# Patient Record
Sex: FEMALE | Race: WHITE | NOT HISPANIC OR LATINO | URBAN - METROPOLITAN AREA
[De-identification: names, ages, dates, MRNs, and addresses within clinical notes are randomized per-mention and may not be internally consistent; named-entity substitution may affect disease eponyms.]

---

## 2017-04-09 ENCOUNTER — INPATIENT HOSPITAL (OUTPATIENT)
Dept: URBAN - METROPOLITAN AREA HOSPITAL 76 | Facility: HOSPITAL | Age: 77
End: 2017-04-09

## 2017-04-09 DIAGNOSIS — D50.9 IRON DEFICIENCY ANEMIA, UNSPECIFIED: ICD-10-CM

## 2017-04-09 DIAGNOSIS — R10.13 EPIGASTRIC PAIN: ICD-10-CM

## 2017-04-09 PROCEDURE — 99221 1ST HOSP IP/OBS SF/LOW 40: CPT | Performed by: INTERNAL MEDICINE

## 2017-04-10 PROCEDURE — 43235 EGD DIAGNOSTIC BRUSH WASH: CPT | Performed by: INTERNAL MEDICINE

## 2017-04-11 PROCEDURE — 99231 SBSQ HOSP IP/OBS SF/LOW 25: CPT | Performed by: INTERNAL MEDICINE

## 2018-03-09 ENCOUNTER — HOSPITAL ENCOUNTER (OUTPATIENT)
Dept: FAMILY MEDICINE CLINIC | Facility: CLINIC | Age: 78
Setting detail: SPECIMEN
Discharge: HOME | End: 2018-03-09
Attending: FAMILY MEDICINE | Admitting: FAMILY MEDICINE

## 2018-06-05 ENCOUNTER — HOSPITAL ENCOUNTER (OUTPATIENT)
Dept: FAMILY MEDICINE CLINIC | Facility: CLINIC | Age: 78
Setting detail: SPECIMEN
Discharge: HOME | End: 2018-06-05
Attending: FAMILY MEDICINE | Admitting: FAMILY MEDICINE

## 2019-03-22 ENCOUNTER — HOSPITAL ENCOUNTER (OUTPATIENT)
Dept: FAMILY MEDICINE CLINIC | Facility: CLINIC | Age: 79
Discharge: HOME | End: 2019-03-22
Attending: FAMILY MEDICINE | Admitting: FAMILY MEDICINE

## 2019-03-22 ENCOUNTER — HOSPITAL ENCOUNTER (OUTPATIENT)
Dept: FAMILY MEDICINE CLINIC | Facility: CLINIC | Age: 79
Setting detail: SPECIMEN
Discharge: HOME | End: 2019-03-22
Attending: FAMILY MEDICINE | Admitting: FAMILY MEDICINE

## 2019-03-22 LAB
ALBUMIN SERPL-MCNC: 3.6 G/DL (ref 3.5–4.8)
ALBUMIN/GLOB SERPL: 1.3 {RATIO} (ref 1–1.7)
ALP SERPL-CCNC: 84 IU/L (ref 32–91)
ALT SERPL-CCNC: 8 IU/L (ref 14–54)
ANION GAP SERPL CALC-SCNC: 15.9 MMOL/L (ref 10–20)
AST SERPL-CCNC: 16 IU/L (ref 15–41)
BASOPHILS # BLD AUTO: 0 10*3/UL (ref 0–0.2)
BASOPHILS NFR BLD AUTO: 1 % (ref 0–2)
BILIRUB SERPL-MCNC: 0.5 MG/DL (ref 0.3–1.2)
BUN SERPL-MCNC: 14 MG/DL (ref 8–20)
BUN/CREAT SERPL: 35 (ref 5.4–26.2)
CALCIUM SERPL-MCNC: 8.7 MG/DL (ref 8.9–10.3)
CHLORIDE SERPL-SCNC: 103 MMOL/L (ref 101–111)
CONV CO2: 23 MMOL/L (ref 22–32)
CONV TOTAL PROTEIN: 6.3 G/DL (ref 6.1–7.9)
CREAT UR-MCNC: 0.4 MG/DL (ref 0.4–1)
DIFFERENTIAL METHOD BLD: (no result)
EOSINOPHIL # BLD AUTO: 0.1 10*3/UL (ref 0–0.3)
EOSINOPHIL # BLD AUTO: 1 % (ref 0–3)
ERYTHROCYTE [DISTWIDTH] IN BLOOD BY AUTOMATED COUNT: 17.1 % (ref 11.5–14.5)
GLOBULIN UR ELPH-MCNC: 2.7 G/DL (ref 2.5–3.8)
GLUCOSE SERPL-MCNC: 78 MG/DL (ref 65–99)
HCT VFR BLD AUTO: 38.3 % (ref 35–49)
HGB BLD-MCNC: 12.3 G/DL (ref 12–15)
LYMPHOCYTES # BLD AUTO: 1.2 10*3/UL (ref 0.8–4.8)
LYMPHOCYTES NFR BLD AUTO: 15 % (ref 18–42)
MCH RBC QN AUTO: 26.2 PG (ref 26–32)
MCHC RBC AUTO-ENTMCNC: 32.1 G/DL (ref 32–36)
MCV RBC AUTO: 81.8 FL (ref 80–94)
MONOCYTES # BLD AUTO: 0.6 10*3/UL (ref 0.1–1.3)
MONOCYTES NFR BLD AUTO: 7 % (ref 2–11)
NEUTROPHILS # BLD AUTO: 5.7 10*3/UL (ref 2.3–8.6)
NEUTROPHILS NFR BLD AUTO: 76 % (ref 50–75)
NRBC BLD AUTO-RTO: 0 /100{WBCS}
NRBC/RBC NFR BLD MANUAL: 0 10*3/UL
PLATELET # BLD AUTO: 169 10*3/UL (ref 150–450)
PMV BLD AUTO: 9.9 FL (ref 7.4–10.4)
POTASSIUM SERPL-SCNC: 3.9 MMOL/L (ref 3.6–5.1)
RBC # BLD AUTO: 4.68 10*6/UL (ref 4–5.4)
SODIUM SERPL-SCNC: 138 MMOL/L (ref 136–144)
WBC # BLD AUTO: 7.6 10*3/UL (ref 4.5–11.5)

## 2019-06-17 RX ORDER — ZOLPIDEM TARTRATE 10 MG/1
10 TABLET ORAL NIGHTLY PRN
Qty: 30 TABLET | Refills: 0 | Status: SHIPPED | OUTPATIENT
Start: 2019-06-17 | End: 2019-07-22 | Stop reason: SDUPTHER

## 2019-06-17 RX ORDER — ZOLPIDEM TARTRATE 10 MG/1
TABLET ORAL
COMMUNITY
Start: 2019-05-21 | End: 2019-06-17 | Stop reason: SDUPTHER

## 2019-06-17 NOTE — TELEPHONE ENCOUNTER
Zolpidem request. Last filled at pharmacy 5/21/19.  Pt cancelled appt today - rescheduled for 7/22/19.

## 2019-06-20 ENCOUNTER — TELEPHONE (OUTPATIENT)
Dept: FAMILY MEDICINE CLINIC | Facility: CLINIC | Age: 79
End: 2019-06-20

## 2019-06-20 NOTE — TELEPHONE ENCOUNTER
"Trina  nurse left VM stating that pt's R/Knee pain is \"intolerable\".  [facility] is asking if you want them to do an x-ray, if not they can send her to the ER.  (Note: [employer] was down most of the afternoon and VM's were not able to retrieve while network out)  "

## 2019-06-27 ENCOUNTER — TELEPHONE (OUTPATIENT)
Dept: FAMILY MEDICINE CLINIC | Facility: CLINIC | Age: 79
End: 2019-06-27

## 2019-06-27 RX ORDER — LEVOTHYROXINE SODIUM 0.15 MG/1
TABLET ORAL
COMMUNITY
Start: 2018-01-23 | End: 2019-08-26

## 2019-06-27 RX ORDER — DIAZEPAM 10 MG/1
10 TABLET ORAL NIGHTLY PRN
Refills: 0 | COMMUNITY
Start: 2019-05-17 | End: 2019-07-22 | Stop reason: SDUPTHER

## 2019-06-27 RX ORDER — ACETAMINOPHEN 325 MG/1
TABLET ORAL
COMMUNITY
End: 2019-08-26

## 2019-06-27 RX ORDER — LEVOTHYROXINE SODIUM 137 UG/1
TABLET ORAL
COMMUNITY
End: 2020-04-01 | Stop reason: SDUPTHER

## 2019-06-27 RX ORDER — APIXABAN 5 MG/1
TABLET, FILM COATED ORAL
Refills: 1 | COMMUNITY
Start: 2019-03-22 | End: 2019-08-26

## 2019-06-27 RX ORDER — SULFAMETHOXAZOLE AND TRIMETHOPRIM 800; 160 MG/1; MG/1
2 TABLET ORAL 2 TIMES DAILY
Refills: 0 | COMMUNITY
Start: 2019-03-25 | End: 2019-08-26

## 2019-06-27 RX ORDER — CHOLECALCIFEROL (VITAMIN D3) 1250 MCG
CAPSULE ORAL
COMMUNITY
Start: 2018-06-11 | End: 2019-09-26 | Stop reason: SDUPTHER

## 2019-06-27 RX ORDER — GABAPENTIN 100 MG/1
CAPSULE ORAL
Refills: 2 | COMMUNITY
Start: 2019-05-20 | End: 2019-09-20 | Stop reason: SDUPTHER

## 2019-06-27 RX ORDER — NALOXONE HYDROCHLORIDE 2 MG/.4ML
2 INJECTION, SOLUTION INTRAMUSCULAR; SUBCUTANEOUS
COMMUNITY
End: 2021-02-26

## 2019-06-27 RX ORDER — BISACODYL 10 MG
SUPPOSITORY, RECTAL RECTAL
COMMUNITY
End: 2019-08-26

## 2019-06-27 RX ORDER — NYSTATIN 100000 U/G
OINTMENT TOPICAL
COMMUNITY
End: 2019-12-05

## 2019-06-27 RX ORDER — FENTANYL 100 UG/H
PATCH TRANSDERMAL
Refills: 0 | COMMUNITY
Start: 2019-06-07 | End: 2019-07-10 | Stop reason: SDUPTHER

## 2019-06-27 RX ORDER — HYDROCODONE BITARTRATE AND ACETAMINOPHEN 7.5; 325 MG/1; MG/1
TABLET ORAL
COMMUNITY
Start: 2018-06-09 | End: 2019-07-10 | Stop reason: SDUPTHER

## 2019-06-27 RX ORDER — RANITIDINE 150 MG/1
150 TABLET ORAL 2 TIMES DAILY PRN
COMMUNITY
End: 2020-11-24

## 2019-06-27 RX ORDER — ZOLPIDEM TARTRATE 10 MG/1
TABLET ORAL
COMMUNITY
Start: 2018-01-23 | End: 2019-07-22 | Stop reason: SDUPTHER

## 2019-06-27 NOTE — TELEPHONE ENCOUNTER
Refill phenergan 25 mg [facility]  * this medication is not in her med list    Last visit 3.22.19  Last lab 3.22.19

## 2019-07-03 RX ORDER — PROMETHAZINE HYDROCHLORIDE 25 MG/1
TABLET ORAL
Qty: 60 TABLET | Refills: 0 | Status: SHIPPED | OUTPATIENT
Start: 2019-07-03 | End: 2019-09-13

## 2019-07-03 NOTE — TELEPHONE ENCOUNTER
Last visit:  3/22/19  Next visit: 7/22/19  Last labs: 3/22/19    Rx requested: Promethazine 25mg   Pharmacy: [facility] in [place]

## 2019-07-10 RX ORDER — HYDROCODONE BITARTRATE AND ACETAMINOPHEN 7.5; 325 MG/1; MG/1
1 TABLET ORAL DAILY PRN
Qty: 10 TABLET | Refills: 0 | Status: SHIPPED | OUTPATIENT
Start: 2019-07-10 | End: 2019-07-22 | Stop reason: SDUPTHER

## 2019-07-10 RX ORDER — FENTANYL 100 UG/H
PATCH TRANSDERMAL
Qty: 4 PATCH | Refills: 0 | Status: SHIPPED | OUTPATIENT
Start: 2019-07-10 | End: 2019-08-14 | Stop reason: SDUPTHER

## 2019-07-10 NOTE — TELEPHONE ENCOUNTER
Pt called requesting refills of patches and pain meds - [facility]  Inspect ran and given to  to scan.   Has appt jose j Agarwal 7/22

## 2019-07-22 ENCOUNTER — TELEPHONE (OUTPATIENT)
Dept: FAMILY MEDICINE CLINIC | Facility: CLINIC | Age: 79
End: 2019-07-22

## 2019-07-22 PROBLEM — Z12.31 VISIT FOR SCREENING MAMMOGRAM: Status: ACTIVE | Noted: 2018-03-09

## 2019-07-22 PROBLEM — M79.606 PAIN OF LOWER EXTREMITY: Status: ACTIVE | Noted: 2019-03-22

## 2019-07-22 PROBLEM — G14 POST POLIOMYELITIS SYNDROME: Status: ACTIVE | Noted: 2018-01-23

## 2019-07-22 PROBLEM — M79.661 RIGHT CALF PAIN: Status: ACTIVE | Noted: 2019-03-22

## 2019-07-22 PROBLEM — G62.9 NEUROPATHY, PERIPHERAL: Status: ACTIVE | Noted: 2018-01-23

## 2019-07-22 PROBLEM — R82.90 ABNORMAL URINALYSIS: Status: ACTIVE | Noted: 2018-07-27

## 2019-07-22 PROBLEM — R53.83 MALAISE AND FATIGUE: Status: ACTIVE | Noted: 2018-03-09

## 2019-07-22 PROBLEM — K21.9 GERD (GASTROESOPHAGEAL REFLUX DISEASE): Status: ACTIVE | Noted: 2018-01-23

## 2019-07-22 PROBLEM — R23.8 BROKEN SKIN: Status: ACTIVE | Noted: 2018-06-05

## 2019-07-22 PROBLEM — L89.109: Status: ACTIVE | Noted: 2018-07-27

## 2019-07-22 PROBLEM — M54.89 OTHER DORSALGIA: Status: ACTIVE | Noted: 2018-02-09

## 2019-07-22 PROBLEM — E55.9 VITAMIN D DEFICIENCY: Status: ACTIVE | Noted: 2018-06-05

## 2019-07-22 PROBLEM — G47.00 INSOMNIA: Status: ACTIVE | Noted: 2018-01-23

## 2019-07-22 PROBLEM — M41.9 KYPHOSCOLIOSIS: Status: ACTIVE | Noted: 2018-01-23

## 2019-07-22 PROBLEM — L98.499 SKIN ULCER: Status: ACTIVE | Noted: 2018-07-27

## 2019-07-22 PROBLEM — A80.9 POLIOMYELITIS: Status: ACTIVE | Noted: 2018-01-23

## 2019-07-22 PROBLEM — M40.209 KYPHOSIS: Status: ACTIVE | Noted: 2018-01-23

## 2019-07-22 PROBLEM — E03.9 HYPOTHYROIDISM: Status: ACTIVE | Noted: 2018-01-23

## 2019-07-22 PROBLEM — M25.571 ARTHRALGIA OF RIGHT FOOT: Status: ACTIVE | Noted: 2019-03-22

## 2019-07-22 PROBLEM — R53.81 MALAISE AND FATIGUE: Status: ACTIVE | Noted: 2018-03-09

## 2019-07-22 PROBLEM — M81.8 DISUSE OSTEOPOROSIS: Status: ACTIVE | Noted: 2018-01-23

## 2019-07-22 RX ORDER — HYDROCODONE BITARTRATE AND ACETAMINOPHEN 7.5; 325 MG/1; MG/1
1 TABLET ORAL DAILY PRN
Qty: 30 TABLET | Refills: 0 | Status: SHIPPED | OUTPATIENT
Start: 2019-07-22 | End: 2019-08-26 | Stop reason: SDUPTHER

## 2019-07-22 RX ORDER — ZOLPIDEM TARTRATE 10 MG/1
10 TABLET ORAL NIGHTLY PRN
Qty: 30 TABLET | Refills: 0 | Status: SHIPPED | OUTPATIENT
Start: 2019-07-22 | End: 2019-08-19 | Stop reason: SDUPTHER

## 2019-07-22 RX ORDER — DIAZEPAM 10 MG/1
10 TABLET ORAL NIGHTLY PRN
Qty: 30 TABLET | Refills: 0 | Status: SHIPPED | OUTPATIENT
Start: 2019-07-22 | End: 2019-08-19 | Stop reason: SDUPTHER

## 2019-07-22 NOTE — TELEPHONE ENCOUNTER
"Pt called this morning to inform us that the transportation company called her to tell her they had no one available to bring her to her appt today, even though she scheduled it over a week ago with them.    I called [facility] and told them it was urgent that she be seen today, and that they needed to find someone to bring her, even if it was earlier in the day. They did not seem to think they could get anyone but said they would \"try\". (I was not very happy, and they knew it).    I called pt and informed her they \"may call her when a ride is ready\".  She is very upset, knows that she needs to be seen.  States she is out of her norco, ambien, and diazepam.  I told her I would inform Dr. Agarwal of the situation in case she wouldn't make appt.  Pt will call office if she hears from transportation company with any updates.  "

## 2019-07-23 NOTE — TELEPHONE ENCOUNTER
Pt was not picked up by transportation.  Dr. Franz sent in meds.  Pt was notified and rescheduled.

## 2019-08-09 ENCOUNTER — TELEPHONE (OUTPATIENT)
Dept: FAMILY MEDICINE CLINIC | Facility: CLINIC | Age: 79
End: 2019-08-09

## 2019-08-09 RX ORDER — VALACYCLOVIR HYDROCHLORIDE 1 G/1
1000 TABLET, FILM COATED ORAL 3 TIMES DAILY
Qty: 21 TABLET | Refills: 0 | Status: SHIPPED | OUTPATIENT
Start: 2019-08-09 | End: 2019-08-26

## 2019-08-14 RX ORDER — HYDROCODONE BITARTRATE AND ACETAMINOPHEN 7.5; 325 MG/1; MG/1
1 TABLET ORAL DAILY PRN
Qty: 30 TABLET | Refills: 0 | OUTPATIENT
Start: 2019-08-14

## 2019-08-14 RX ORDER — DIAZEPAM 10 MG/1
10 TABLET ORAL NIGHTLY PRN
Qty: 30 TABLET | Refills: 0 | OUTPATIENT
Start: 2019-08-14

## 2019-08-14 RX ORDER — FENTANYL 100 UG/H
PATCH TRANSDERMAL
Qty: 10 PATCH | Refills: 0 | Status: SHIPPED | OUTPATIENT
Start: 2019-08-14 | End: 2019-08-26 | Stop reason: SDUPTHER

## 2019-08-14 RX ORDER — ZOLPIDEM TARTRATE 10 MG/1
10 TABLET ORAL NIGHTLY PRN
Qty: 30 TABLET | Refills: 0 | OUTPATIENT
Start: 2019-08-14

## 2019-08-14 RX ORDER — FENTANYL 100 UG/H
PATCH TRANSDERMAL
Qty: 4 PATCH | Refills: 0 | OUTPATIENT
Start: 2019-08-14

## 2019-08-14 NOTE — TELEPHONE ENCOUNTER
Pt was unable to make appt today due to transportation company AGAIN.    Pt will need refills on the following:  Diazepam  Hydrocodone  ambien  Fentanyl patches    [facility]    Pt did reschedule for later in the month.

## 2019-08-19 RX ORDER — ZOLPIDEM TARTRATE 10 MG/1
10 TABLET ORAL NIGHTLY PRN
Qty: 30 TABLET | Refills: 0 | Status: SHIPPED | OUTPATIENT
Start: 2019-08-19 | End: 2019-09-20 | Stop reason: SDUPTHER

## 2019-08-19 RX ORDER — DIAZEPAM 10 MG/1
10 TABLET ORAL NIGHTLY PRN
Qty: 30 TABLET | Refills: 0 | Status: SHIPPED | OUTPATIENT
Start: 2019-08-19 | End: 2019-09-20 | Stop reason: SDUPTHER

## 2019-08-26 ENCOUNTER — OFFICE VISIT (OUTPATIENT)
Dept: FAMILY MEDICINE CLINIC | Facility: CLINIC | Age: 79
End: 2019-08-26

## 2019-08-26 VITALS
DIASTOLIC BLOOD PRESSURE: 77 MMHG | HEART RATE: 96 BPM | OXYGEN SATURATION: 97 % | SYSTOLIC BLOOD PRESSURE: 146 MMHG | RESPIRATION RATE: 18 BRPM | TEMPERATURE: 98.1 F

## 2019-08-26 DIAGNOSIS — Z12.39 BREAST CANCER SCREENING: ICD-10-CM

## 2019-08-26 DIAGNOSIS — E03.9 ACQUIRED HYPOTHYROIDISM: ICD-10-CM

## 2019-08-26 DIAGNOSIS — B02.29 POSTHERPETIC NEURALGIA: ICD-10-CM

## 2019-08-26 DIAGNOSIS — M41.9 KYPHOSCOLIOSIS: ICD-10-CM

## 2019-08-26 DIAGNOSIS — G14 POST-POLIO SYNDROME: Primary | ICD-10-CM

## 2019-08-26 DIAGNOSIS — E55.9 VITAMIN D DEFICIENCY: ICD-10-CM

## 2019-08-26 DIAGNOSIS — Z23 NEED FOR PNEUMOCOCCAL VACCINE: ICD-10-CM

## 2019-08-26 DIAGNOSIS — L89.143 PRESSURE INJURY OF LEFT LOWER BACK, STAGE 3 (HCC): ICD-10-CM

## 2019-08-26 LAB
T4 FREE SERPL-MCNC: 0.93 NG/DL (ref 0.58–1.64)
TSH SERPL DL<=0.05 MIU/L-ACNC: 0.72 MIU/ML (ref 0.34–5.6)

## 2019-08-26 PROCEDURE — 99214 OFFICE O/P EST MOD 30 MIN: CPT | Performed by: FAMILY MEDICINE

## 2019-08-26 PROCEDURE — 84439 ASSAY OF FREE THYROXINE: CPT | Performed by: FAMILY MEDICINE

## 2019-08-26 PROCEDURE — 36415 COLL VENOUS BLD VENIPUNCTURE: CPT | Performed by: FAMILY MEDICINE

## 2019-08-26 PROCEDURE — 90670 PCV13 VACCINE IM: CPT | Performed by: FAMILY MEDICINE

## 2019-08-26 PROCEDURE — 82306 VITAMIN D 25 HYDROXY: CPT | Performed by: FAMILY MEDICINE

## 2019-08-26 PROCEDURE — G0009 ADMIN PNEUMOCOCCAL VACCINE: HCPCS | Performed by: FAMILY MEDICINE

## 2019-08-26 PROCEDURE — 84443 ASSAY THYROID STIM HORMONE: CPT | Performed by: FAMILY MEDICINE

## 2019-08-26 RX ORDER — NITROGLYCERIN 0.4 MG/1
0.4 TABLET SUBLINGUAL
Qty: 50 TABLET | Refills: 1 | Status: SHIPPED | OUTPATIENT
Start: 2019-08-26 | End: 2020-04-07 | Stop reason: SDUPTHER

## 2019-08-26 RX ORDER — FENTANYL 100 UG/H
1 PATCH TRANSDERMAL
Qty: 15 PATCH | Refills: 0 | Status: SHIPPED | OUTPATIENT
Start: 2019-08-26 | End: 2019-11-08 | Stop reason: SDUPTHER

## 2019-08-26 RX ORDER — HYDROCODONE BITARTRATE AND ACETAMINOPHEN 7.5; 325 MG/1; MG/1
1 TABLET ORAL DAILY PRN
Qty: 30 TABLET | Refills: 0 | Status: SHIPPED | OUTPATIENT
Start: 2019-08-26 | End: 2019-09-30 | Stop reason: SDUPTHER

## 2019-08-27 LAB — 25(OH)D3 SERPL-MCNC: 48 NG/ML (ref 30–100)

## 2019-09-13 RX ORDER — GABAPENTIN 100 MG/1
CAPSULE ORAL
Qty: 150 CAPSULE | Refills: 0 | Status: SHIPPED | OUTPATIENT
Start: 2019-09-13 | End: 2019-11-07 | Stop reason: SDUPTHER

## 2019-09-13 RX ORDER — PROMETHAZINE HYDROCHLORIDE 25 MG/1
TABLET ORAL
Qty: 60 TABLET | Refills: 0 | OUTPATIENT
Start: 2019-09-13

## 2019-09-13 RX ORDER — PROMETHAZINE HYDROCHLORIDE 25 MG/1
25 TABLET ORAL EVERY 6 HOURS PRN
Qty: 46 TABLET | Refills: 0 | Status: SHIPPED | OUTPATIENT
Start: 2019-09-13 | End: 2019-11-07 | Stop reason: SDUPTHER

## 2019-09-13 RX ORDER — GABAPENTIN 100 MG/1
CAPSULE ORAL
Refills: 2 | OUTPATIENT
Start: 2019-09-13

## 2019-09-20 RX ORDER — ZOLPIDEM TARTRATE 10 MG/1
10 TABLET ORAL NIGHTLY PRN
Qty: 30 TABLET | Refills: 0 | Status: SHIPPED | OUTPATIENT
Start: 2019-09-20 | End: 2019-09-20 | Stop reason: SDUPTHER

## 2019-09-20 RX ORDER — DIAZEPAM 10 MG/1
10 TABLET ORAL NIGHTLY PRN
Qty: 30 TABLET | Refills: 0 | Status: SHIPPED | OUTPATIENT
Start: 2019-09-20 | End: 2019-09-23

## 2019-09-20 RX ORDER — DIAZEPAM 10 MG/1
10 TABLET ORAL NIGHTLY PRN
Qty: 30 TABLET | Refills: 0 | Status: SHIPPED | OUTPATIENT
Start: 2019-09-20 | End: 2019-09-20 | Stop reason: SDUPTHER

## 2019-09-20 RX ORDER — ZOLPIDEM TARTRATE 10 MG/1
10 TABLET ORAL NIGHTLY PRN
Qty: 30 TABLET | Refills: 0 | Status: SHIPPED | OUTPATIENT
Start: 2019-09-20 | End: 2019-09-23 | Stop reason: SDUPTHER

## 2019-09-20 NOTE — TELEPHONE ENCOUNTER
"Pt called saying that [facility] told her that they will no longer be coming 2x/wk to change dressings on back after 10/1. I asked her how it was looking and she says they tell her it \"looks good\" but its not well yet. She said it was initially ordered from Dr Solis.  "

## 2019-09-23 RX ORDER — DIAZEPAM 10 MG/1
10 TABLET ORAL DAILY PRN
Qty: 30 TABLET | Refills: 0 | Status: SHIPPED | OUTPATIENT
Start: 2019-09-23 | End: 2019-11-04 | Stop reason: SDUPTHER

## 2019-09-23 RX ORDER — ZOLPIDEM TARTRATE 10 MG/1
10 TABLET ORAL NIGHTLY PRN
Qty: 30 TABLET | Refills: 0 | Status: SHIPPED | OUTPATIENT
Start: 2019-09-23 | End: 2019-10-21

## 2019-09-26 ENCOUNTER — TELEPHONE (OUTPATIENT)
Dept: FAMILY MEDICINE CLINIC | Facility: CLINIC | Age: 79
End: 2019-09-26

## 2019-09-26 RX ORDER — CHOLECALCIFEROL (VITAMIN D3) 1250 MCG
50000 CAPSULE ORAL
Qty: 12 CAPSULE | Refills: 0 | Status: SHIPPED | OUTPATIENT
Start: 2019-09-26 | End: 2020-04-07

## 2019-09-30 ENCOUNTER — TELEPHONE (OUTPATIENT)
Dept: FAMILY MEDICINE CLINIC | Facility: CLINIC | Age: 79
End: 2019-09-30

## 2019-09-30 RX ORDER — HYDROCODONE BITARTRATE AND ACETAMINOPHEN 7.5; 325 MG/1; MG/1
1 TABLET ORAL DAILY PRN
Qty: 30 TABLET | Refills: 0 | Status: SHIPPED | OUTPATIENT
Start: 2019-09-30 | End: 2019-10-31 | Stop reason: SDUPTHER

## 2019-10-21 RX ORDER — ZOLPIDEM TARTRATE 10 MG/1
TABLET ORAL
Qty: 30 TABLET | Refills: 0 | Status: SHIPPED | OUTPATIENT
Start: 2019-10-21 | End: 2019-11-18 | Stop reason: SDUPTHER

## 2019-10-21 RX ORDER — DIAZEPAM 10 MG/1
TABLET ORAL
Qty: 30 TABLET | Refills: 0 | Status: SHIPPED | OUTPATIENT
Start: 2019-10-21 | End: 2019-11-18 | Stop reason: SDUPTHER

## 2019-10-31 RX ORDER — HYDROCODONE BITARTRATE AND ACETAMINOPHEN 7.5; 325 MG/1; MG/1
1 TABLET ORAL DAILY PRN
Qty: 30 TABLET | Refills: 0 | Status: SHIPPED | OUTPATIENT
Start: 2019-10-31 | End: 2019-12-02 | Stop reason: SDUPTHER

## 2019-11-04 RX ORDER — HYDROCODONE BITARTRATE AND ACETAMINOPHEN 7.5; 325 MG/1; MG/1
1 TABLET ORAL DAILY PRN
Qty: 30 TABLET | Refills: 0
Start: 2019-11-04 | End: 2019-11-04 | Stop reason: SDUPTHER

## 2019-11-07 RX ORDER — PROMETHAZINE HYDROCHLORIDE 25 MG/1
25 TABLET ORAL EVERY 6 HOURS PRN
Qty: 46 TABLET | Refills: 0 | Status: SHIPPED | OUTPATIENT
Start: 2019-11-07 | End: 2019-12-31

## 2019-11-07 RX ORDER — GABAPENTIN 100 MG/1
CAPSULE ORAL
Qty: 150 CAPSULE | Refills: 0 | Status: SHIPPED | OUTPATIENT
Start: 2019-11-07 | End: 2020-01-31

## 2019-11-08 RX ORDER — FENTANYL 100 UG/H
1 PATCH TRANSDERMAL
Qty: 10 PATCH | Refills: 0 | Status: SHIPPED | OUTPATIENT
Start: 2019-11-08 | End: 2019-11-08 | Stop reason: SDUPTHER

## 2019-11-08 RX ORDER — FENTANYL 100 UG/H
1 PATCH TRANSDERMAL
Qty: 10 PATCH | Refills: 0 | Status: SHIPPED | OUTPATIENT
Start: 2019-11-08 | End: 2019-12-11 | Stop reason: SDUPTHER

## 2019-11-08 NOTE — TELEPHONE ENCOUNTER
[facility] called asking to verify directions on the patches, bc it says both:     Sig: Place 1 patch on the skin as directed by provider Every Other Day. APPLY ONE PATCH EVERY 72 HRS.    Please verify and resend.

## 2019-11-18 ENCOUNTER — OFFICE VISIT (OUTPATIENT)
Dept: FAMILY MEDICINE CLINIC | Facility: CLINIC | Age: 79
End: 2019-11-18

## 2019-11-18 VITALS
HEIGHT: 62 IN | RESPIRATION RATE: 16 BRPM | HEART RATE: 74 BPM | SYSTOLIC BLOOD PRESSURE: 162 MMHG | TEMPERATURE: 98 F | OXYGEN SATURATION: 96 % | DIASTOLIC BLOOD PRESSURE: 76 MMHG

## 2019-11-18 DIAGNOSIS — M41.9 KYPHOSCOLIOSIS: ICD-10-CM

## 2019-11-18 DIAGNOSIS — Z23 NEED FOR INFLUENZA VACCINATION: ICD-10-CM

## 2019-11-18 DIAGNOSIS — M25.522 ELBOW PAIN, LEFT: Primary | ICD-10-CM

## 2019-11-18 DIAGNOSIS — F51.01 PRIMARY INSOMNIA: ICD-10-CM

## 2019-11-18 DIAGNOSIS — G14 POST POLIOMYELITIS SYNDROME: ICD-10-CM

## 2019-11-18 PROCEDURE — G0008 ADMIN INFLUENZA VIRUS VAC: HCPCS | Performed by: FAMILY MEDICINE

## 2019-11-18 PROCEDURE — 99213 OFFICE O/P EST LOW 20 MIN: CPT | Performed by: FAMILY MEDICINE

## 2019-11-18 PROCEDURE — 90653 IIV ADJUVANT VACCINE IM: CPT | Performed by: FAMILY MEDICINE

## 2019-11-18 RX ORDER — ZOLPIDEM TARTRATE 10 MG/1
10 TABLET ORAL NIGHTLY PRN
Qty: 30 TABLET | Refills: 0 | Status: SHIPPED | OUTPATIENT
Start: 2019-11-18 | End: 2019-12-17 | Stop reason: SDUPTHER

## 2019-11-18 RX ORDER — TIZANIDINE HYDROCHLORIDE 4 MG/1
4 CAPSULE, GELATIN COATED ORAL 3 TIMES DAILY PRN
Qty: 35 CAPSULE | Refills: 0 | Status: SHIPPED | OUTPATIENT
Start: 2019-11-18 | End: 2020-01-07

## 2019-11-18 RX ORDER — DIAZEPAM 10 MG/1
10 TABLET ORAL NIGHTLY PRN
Qty: 30 TABLET | Refills: 0 | Status: SHIPPED | OUTPATIENT
Start: 2019-11-18 | End: 2019-12-18 | Stop reason: SDUPTHER

## 2019-11-26 DIAGNOSIS — Z12.39 BREAST CANCER SCREENING: ICD-10-CM

## 2019-12-02 RX ORDER — HYDROCODONE BITARTRATE AND ACETAMINOPHEN 7.5; 325 MG/1; MG/1
1 TABLET ORAL DAILY PRN
Qty: 30 TABLET | Refills: 0 | Status: SHIPPED | OUTPATIENT
Start: 2019-12-02 | End: 2020-01-02 | Stop reason: SDUPTHER

## 2019-12-02 NOTE — TELEPHONE ENCOUNTER
Pt LM asking for pain med refill - [facility]    appt 11/19  Labs 8/19

## 2019-12-11 ENCOUNTER — TELEPHONE (OUTPATIENT)
Dept: FAMILY MEDICINE CLINIC | Facility: CLINIC | Age: 79
End: 2019-12-11

## 2019-12-11 DIAGNOSIS — G14 POST POLIOMYELITIS SYNDROME: Primary | ICD-10-CM

## 2019-12-11 RX ORDER — FENTANYL 100 UG/H
1 PATCH TRANSDERMAL
Qty: 10 PATCH | Refills: 0 | Status: SHIPPED | OUTPATIENT
Start: 2019-12-11 | End: 2020-01-20 | Stop reason: SDUPTHER

## 2019-12-11 NOTE — TELEPHONE ENCOUNTER
Last visit:  11/18/19  Next visit: 1/7/20  Last labs: 8/26/19    Rx requested: Fentanyl patch   Pharmacy: [facility] in [place]     (Patient called for refill)

## 2019-12-14 NOTE — PROGRESS NOTES
Subjective   Karina Saleem is a 79 y.o. female.     Chief Complaint   Patient presents with   • Elbow Pain     left elbow pain for several weeks   • Pain   • Insomnia         Current Outpatient Medications:   •  Cholecalciferol (VITAMIN D3) 53917 units capsule, Take 1 capsule by mouth Every 7 (Seven) Days., Disp: 12 capsule, Rfl: 0  •  diazePAM (VALIUM) 10 MG tablet, Take 1 tablet by mouth At Night As Needed for Anxiety., Disp: 30 tablet, Rfl: 0  •  gabapentin (NEURONTIN) 100 MG capsule, 2 po bid and 1 po QHS, Disp: 150 capsule, Rfl: 0  •  levothyroxine (SYNTHROID, LEVOTHROID) 137 MCG tablet, levothyroxine 137 mcg tablet, Disp: , Rfl:   •  Naloxone HCl (EVZIO) 2 MG/0.4ML solution auto-injector, 2 mg., Disp: , Rfl:   •  nitroglycerin (NITROSTAT) 0.4 MG SL tablet, Place 1 tablet under the tongue Every 5 (Five) Minutes As Needed for Chest Pain (to max of 3 tabs per episode)., Disp: 50 tablet, Rfl: 1  •  promethazine (PHENERGAN) 25 MG tablet, Take 1 tablet by mouth Every 6 (Six) Hours As Needed for Nausea or Vomiting., Disp: 46 tablet, Rfl: 0  •  raNITIdine (ZANTAC) 150 MG tablet, ranitidine 150 mg tablet, Disp: , Rfl:   •  zolpidem (AMBIEN) 10 MG tablet, Take 1 tablet by mouth At Night As Needed for Sleep., Disp: 30 tablet, Rfl: 0  •  fentaNYL (DURAGESIC) 100 MCG/HR patch, Place 1 patch on the skin as directed by provider Every 72 (Seventy-Two) Hours., Disp: 10 patch, Rfl: 0  •  HYDROcodone-acetaminophen (NORCO) 7.5-325 MG per tablet, Take 1 tablet by mouth Daily As Needed for Moderate Pain ., Disp: 30 tablet, Rfl: 0  •  hydrocortisone-zinc oxide-bacitracin-nystatin cream, Apply 1 application topically to the appropriate area as directed 3 (Three) Times a Day As Needed (rash)., Disp: 120 g, Rfl: 2  •  TiZANidine (ZANAFLEX) 4 MG capsule, Take 1 capsule by mouth 3 (Three) Times a Day As Needed for Muscle Spasms., Disp: 35 capsule, Rfl: 0    Past Medical History:   Diagnosis Date   • Cataracts, bilateral    • Femur  fracture, right (CMS/HCC)    • Hypothyroidism    • Insomnia    • Kyphosis    • Neuropathy     in feet   • Osteoporosis    • Polio     childhood   • Post-polio syndrome    • Scoliosis        Past Surgical History:   Procedure Laterality Date   • APPENDECTOMY     • LIPOMA EXCISION Bilateral     breast   • TOTAL ABDOMINAL HYSTERECTOMY         Family History   Problem Relation Age of Onset   • Diabetes Mother    • Heart disease Mother         CHF   • Heart failure Father    • Heart disease Father    • COPD Father         BLACK LUNG   • Lymphoma Sister    • Lung cancer Brother    • Pancreatic cancer Brother    • Bone cancer Brother    • Cancer Brother        Social History     Socioeconomic History   • Marital status: Single     Spouse name: Not on file   • Number of children: Not on file   • Years of education: Not on file   • Highest education level: Not on file   Tobacco Use   • Smoking status: Never Smoker   • Smokeless tobacco: Never Used   Substance and Sexual Activity   • Alcohol use: No     Frequency: Never   • Drug use: No   • Sexual activity: Defer       78 y/o C female here for f/u on insomnia/ chronic pain and now Left elbow pain    Pt states having some Left elbow pain x few weeks-----admits she uses her Left UE a lot getting in and out of her wheelchair.......       The following portions of the patient's history were reviewed and updated as appropriate: allergies, current medications, past family history, past medical history, past social history, past surgical history and problem list.    Review of Systems   Constitutional: Negative for activity change, fatigue and unexpected weight gain.   Respiratory: Negative for cough, chest tightness and shortness of breath.    Cardiovascular: Negative for chest pain, palpitations and leg swelling.   Genitourinary: Negative for frequency, urgency and urinary incontinence.   Musculoskeletal: Positive for back pain and gait problem. Negative for arthralgias, joint  swelling and myalgias.   Skin: Positive for color change. Negative for rash and bruise.   Neurological: Negative for dizziness, facial asymmetry, speech difficulty, weakness, light-headedness, headache, memory problem and confusion.   Psychiatric/Behavioral: The patient is nervous/anxious.        Vitals:    11/18/19 1506   BP: 162/76   Pulse: 74   Resp: 16   Temp: 98 °F (36.7 °C)   SpO2: 96%       Objective   Physical Exam   Constitutional: She is oriented to person, place, and time. She appears well-developed and well-nourished. No distress.   HENT:   Head: Normocephalic and atraumatic.   Genitourinary: No vaginal discharge found.   Musculoskeletal: She exhibits edema, tenderness and deformity.        Arms:  Neurological: She is alert and oriented to person, place, and time. No cranial nerve deficit.   Skin: Skin is warm and dry. No rash noted. There is erythema.   Psychiatric: She has a normal mood and affect. Her behavior is normal. Judgment and thought content normal.   Nursing note and vitals reviewed.        Assessment/Plan   Karina was seen today for elbow pain, pain and insomnia.    Diagnoses and all orders for this visit:    Elbow pain, left    Post poliomyelitis syndrome    Kyphoscoliosis    Primary insomnia    Need for influenza vaccination  -     Fluad Tri 65yr+    Other orders  -     TiZANidine (ZANAFLEX) 4 MG capsule; Take 1 capsule by mouth 3 (Three) Times a Day As Needed for Muscle Spasms.  -     diazePAM (VALIUM) 10 MG tablet; Take 1 tablet by mouth At Night As Needed for Anxiety.  -     zolpidem (AMBIEN) 10 MG tablet; Take 1 tablet by mouth At Night As Needed for Sleep.    Pt to try 2 elbow pads for Left elbow cushioning due to excessive use of Left UE on wheelchair arm

## 2019-12-17 ENCOUNTER — TELEPHONE (OUTPATIENT)
Dept: FAMILY MEDICINE CLINIC | Facility: CLINIC | Age: 79
End: 2019-12-17

## 2019-12-17 DIAGNOSIS — F51.01 PRIMARY INSOMNIA: Primary | ICD-10-CM

## 2019-12-17 RX ORDER — ZOLPIDEM TARTRATE 10 MG/1
10 TABLET ORAL NIGHTLY PRN
Qty: 30 TABLET | Refills: 0 | Status: SHIPPED | OUTPATIENT
Start: 2019-12-17 | End: 2020-01-17

## 2019-12-18 ENCOUNTER — TELEPHONE (OUTPATIENT)
Dept: FAMILY MEDICINE CLINIC | Facility: CLINIC | Age: 79
End: 2019-12-18

## 2019-12-18 DIAGNOSIS — F41.9 ANXIETY: Primary | ICD-10-CM

## 2019-12-18 RX ORDER — DIAZEPAM 10 MG/1
10 TABLET ORAL NIGHTLY PRN
Qty: 30 TABLET | Refills: 0 | Status: SHIPPED | OUTPATIENT
Start: 2019-12-18 | End: 2020-01-17

## 2019-12-31 RX ORDER — PROMETHAZINE HYDROCHLORIDE 25 MG/1
TABLET ORAL
Qty: 46 TABLET | Refills: 0 | Status: SHIPPED | OUTPATIENT
Start: 2019-12-31 | End: 2020-02-05 | Stop reason: SDUPTHER

## 2020-01-02 ENCOUNTER — TELEPHONE (OUTPATIENT)
Dept: FAMILY MEDICINE CLINIC | Facility: CLINIC | Age: 80
End: 2020-01-02

## 2020-01-02 DIAGNOSIS — G14 POST POLIOMYELITIS SYNDROME: Primary | ICD-10-CM

## 2020-01-02 RX ORDER — HYDROCODONE BITARTRATE AND ACETAMINOPHEN 7.5; 325 MG/1; MG/1
1 TABLET ORAL DAILY PRN
Qty: 30 TABLET | Refills: 0 | Status: SHIPPED | OUTPATIENT
Start: 2020-01-02 | End: 2020-01-30 | Stop reason: SDUPTHER

## 2020-01-07 ENCOUNTER — OFFICE VISIT (OUTPATIENT)
Dept: FAMILY MEDICINE CLINIC | Facility: CLINIC | Age: 80
End: 2020-01-07

## 2020-01-07 VITALS
TEMPERATURE: 98.5 F | HEART RATE: 79 BPM | RESPIRATION RATE: 16 BRPM | HEIGHT: 62 IN | DIASTOLIC BLOOD PRESSURE: 81 MMHG | OXYGEN SATURATION: 93 % | SYSTOLIC BLOOD PRESSURE: 146 MMHG

## 2020-01-07 DIAGNOSIS — M81.8 DISUSE OSTEOPOROSIS: ICD-10-CM

## 2020-01-07 DIAGNOSIS — M25.522 ELBOW PAIN, LEFT: Primary | ICD-10-CM

## 2020-01-07 DIAGNOSIS — L89.133 PRESSURE INJURY OF RIGHT LOWER BACK, STAGE 3 (HCC): ICD-10-CM

## 2020-01-07 DIAGNOSIS — M62.81 MUSCLE WEAKNESS (GENERALIZED): ICD-10-CM

## 2020-01-07 DIAGNOSIS — E55.9 VITAMIN D DEFICIENCY: ICD-10-CM

## 2020-01-07 DIAGNOSIS — F41.9 ANXIETY: ICD-10-CM

## 2020-01-07 DIAGNOSIS — E03.9 ACQUIRED HYPOTHYROIDISM: ICD-10-CM

## 2020-01-07 PROCEDURE — 99214 OFFICE O/P EST MOD 30 MIN: CPT | Performed by: FAMILY MEDICINE

## 2020-01-17 DIAGNOSIS — F51.01 PRIMARY INSOMNIA: ICD-10-CM

## 2020-01-17 DIAGNOSIS — F41.9 ANXIETY: ICD-10-CM

## 2020-01-17 RX ORDER — DIAZEPAM 10 MG/1
TABLET ORAL
Qty: 30 TABLET | Refills: 0 | Status: SHIPPED | OUTPATIENT
Start: 2020-01-17 | End: 2020-02-17

## 2020-01-17 RX ORDER — ZOLPIDEM TARTRATE 10 MG/1
TABLET ORAL
Qty: 30 TABLET | Refills: 0 | Status: SHIPPED | OUTPATIENT
Start: 2020-01-17 | End: 2020-02-17

## 2020-01-20 ENCOUNTER — TELEPHONE (OUTPATIENT)
Dept: FAMILY MEDICINE CLINIC | Facility: CLINIC | Age: 80
End: 2020-01-20

## 2020-01-20 DIAGNOSIS — G14 POST POLIOMYELITIS SYNDROME: ICD-10-CM

## 2020-01-20 RX ORDER — FENTANYL 100 UG/H
1 PATCH TRANSDERMAL
Qty: 10 PATCH | Refills: 0 | Status: SHIPPED | OUTPATIENT
Start: 2020-01-20 | End: 2020-02-18 | Stop reason: SDUPTHER

## 2020-01-30 ENCOUNTER — TELEPHONE (OUTPATIENT)
Dept: FAMILY MEDICINE CLINIC | Facility: CLINIC | Age: 80
End: 2020-01-30

## 2020-01-30 DIAGNOSIS — G14 POST POLIOMYELITIS SYNDROME: ICD-10-CM

## 2020-01-30 RX ORDER — HYDROCODONE BITARTRATE AND ACETAMINOPHEN 7.5; 325 MG/1; MG/1
1 TABLET ORAL DAILY PRN
Qty: 30 TABLET | Refills: 0 | Status: SHIPPED | OUTPATIENT
Start: 2020-01-30 | End: 2020-03-02 | Stop reason: SDUPTHER

## 2020-01-30 NOTE — TELEPHONE ENCOUNTER
Last visit:  1/7/20  Next visit: 4/7/20  Last labs: 8/29/19    Rx requested: gabapentin  Pharmacy:[facility] in [place]

## 2020-01-31 RX ORDER — GABAPENTIN 100 MG/1
CAPSULE ORAL
Qty: 150 CAPSULE | Refills: 0 | Status: SHIPPED | OUTPATIENT
Start: 2020-01-31 | End: 2020-04-07 | Stop reason: SDUPTHER

## 2020-02-05 ENCOUNTER — TELEPHONE (OUTPATIENT)
Dept: FAMILY MEDICINE CLINIC | Facility: CLINIC | Age: 80
End: 2020-02-05

## 2020-02-05 RX ORDER — PROMETHAZINE HYDROCHLORIDE 25 MG/1
25 TABLET ORAL 2 TIMES DAILY PRN
Qty: 46 TABLET | Refills: 0 | Status: SHIPPED | OUTPATIENT
Start: 2020-02-05 | End: 2020-03-18 | Stop reason: SDUPTHER

## 2020-02-05 NOTE — TELEPHONE ENCOUNTER
Promethazine 25mg    [facility]    Last Seen 1/7/2020  Next Appt 4/7/2020

## 2020-02-13 ENCOUNTER — TELEPHONE (OUTPATIENT)
Dept: FAMILY MEDICINE CLINIC | Facility: CLINIC | Age: 80
End: 2020-02-13

## 2020-02-13 RX ORDER — OSELTAMIVIR PHOSPHATE 75 MG/1
75 CAPSULE ORAL 2 TIMES DAILY
Qty: 10 CAPSULE | Refills: 0 | Status: SHIPPED | OUTPATIENT
Start: 2020-02-13 | End: 2020-04-07

## 2020-02-13 NOTE — TELEPHONE ENCOUNTER
Pt phoned. States she thinks she either has the flu or a cold.  Severe headaches.  Unable to get a ride to come in.  [facility]

## 2020-02-17 DIAGNOSIS — F51.01 PRIMARY INSOMNIA: ICD-10-CM

## 2020-02-17 DIAGNOSIS — F41.9 ANXIETY: ICD-10-CM

## 2020-02-17 RX ORDER — DIAZEPAM 10 MG/1
TABLET ORAL
Qty: 30 TABLET | Refills: 0 | Status: SHIPPED | OUTPATIENT
Start: 2020-02-17 | End: 2020-03-16 | Stop reason: SDUPTHER

## 2020-02-17 RX ORDER — ZOLPIDEM TARTRATE 10 MG/1
TABLET ORAL
Qty: 30 TABLET | Refills: 0 | Status: SHIPPED | OUTPATIENT
Start: 2020-02-17 | End: 2020-03-16 | Stop reason: SDUPTHER

## 2020-02-18 ENCOUNTER — TELEPHONE (OUTPATIENT)
Dept: FAMILY MEDICINE CLINIC | Facility: CLINIC | Age: 80
End: 2020-02-18

## 2020-02-18 DIAGNOSIS — G14 POST POLIOMYELITIS SYNDROME: ICD-10-CM

## 2020-02-18 RX ORDER — FENTANYL 100 UG/H
1 PATCH TRANSDERMAL
Qty: 10 PATCH | Refills: 0 | Status: SHIPPED | OUTPATIENT
Start: 2020-02-18 | End: 2020-03-23 | Stop reason: SDUPTHER

## 2020-02-18 NOTE — TELEPHONE ENCOUNTER
Fentanyl 100mcg/hr patch    [facility] [place]    Last Seen 1/7/2020  Next Appt 4/7/2020

## 2020-03-02 ENCOUNTER — TELEPHONE (OUTPATIENT)
Dept: FAMILY MEDICINE CLINIC | Facility: CLINIC | Age: 80
End: 2020-03-02

## 2020-03-02 DIAGNOSIS — G14 POST POLIOMYELITIS SYNDROME: ICD-10-CM

## 2020-03-02 RX ORDER — ERGOCALCIFEROL 1.25 MG/1
CAPSULE ORAL
Qty: 12 CAPSULE | Refills: 0 | OUTPATIENT
Start: 2020-03-02

## 2020-03-02 RX ORDER — HYDROCODONE BITARTRATE AND ACETAMINOPHEN 7.5; 325 MG/1; MG/1
1 TABLET ORAL DAILY PRN
Qty: 30 TABLET | Refills: 0 | Status: SHIPPED | OUTPATIENT
Start: 2020-03-02 | End: 2020-03-31 | Stop reason: SDUPTHER

## 2020-03-02 NOTE — TELEPHONE ENCOUNTER
Last visit: 1/7/20  Next visit: 4/7/20  Last labs: 8/26/19    Rx requested: vitamin D 1.25 MG (95035 UT) capsule  Pharmacy:[facility] in [place]

## 2020-03-16 DIAGNOSIS — F41.9 ANXIETY: ICD-10-CM

## 2020-03-16 DIAGNOSIS — F51.01 PRIMARY INSOMNIA: ICD-10-CM

## 2020-03-16 RX ORDER — ZOLPIDEM TARTRATE 10 MG/1
TABLET ORAL
Qty: 30 TABLET | Refills: 0 | Status: CANCELLED | OUTPATIENT
Start: 2020-03-16

## 2020-03-16 RX ORDER — ZOLPIDEM TARTRATE 10 MG/1
10 TABLET ORAL NIGHTLY PRN
Qty: 30 TABLET | Refills: 0 | Status: SHIPPED | OUTPATIENT
Start: 2020-03-16 | End: 2020-04-07 | Stop reason: SDUPTHER

## 2020-03-16 RX ORDER — DIAZEPAM 10 MG/1
TABLET ORAL
Qty: 30 TABLET | Refills: 0 | Status: CANCELLED | OUTPATIENT
Start: 2020-03-16

## 2020-03-16 RX ORDER — DIAZEPAM 10 MG/1
10 TABLET ORAL NIGHTLY PRN
Qty: 30 TABLET | Refills: 0 | Status: SHIPPED | OUTPATIENT
Start: 2020-03-16 | End: 2020-04-07 | Stop reason: SDUPTHER

## 2020-03-18 RX ORDER — PROMETHAZINE HYDROCHLORIDE 25 MG/1
25 TABLET ORAL 2 TIMES DAILY PRN
Qty: 46 TABLET | Refills: 0 | Status: SHIPPED | OUTPATIENT
Start: 2020-03-18 | End: 2020-04-07 | Stop reason: SDUPTHER

## 2020-03-23 DIAGNOSIS — G14 POST POLIOMYELITIS SYNDROME: ICD-10-CM

## 2020-03-23 RX ORDER — FENTANYL 100 UG/H
1 PATCH TRANSDERMAL
Qty: 10 PATCH | Refills: 0 | Status: SHIPPED | OUTPATIENT
Start: 2020-03-23 | End: 2020-06-10 | Stop reason: SDUPTHER

## 2020-03-23 NOTE — TELEPHONE ENCOUNTER
Pt called for refill of her pain patches to [facility], please. Last filled 2/18.    Last Seen 1/7/2020  Next Appt/labs due 4/7/2020

## 2020-03-31 DIAGNOSIS — G14 POST POLIOMYELITIS SYNDROME: ICD-10-CM

## 2020-03-31 RX ORDER — HYDROCODONE BITARTRATE AND ACETAMINOPHEN 7.5; 325 MG/1; MG/1
1 TABLET ORAL DAILY PRN
Qty: 30 TABLET | Refills: 0 | Status: SHIPPED | OUTPATIENT
Start: 2020-03-31 | End: 2020-04-30 | Stop reason: SDUPTHER

## 2020-04-01 RX ORDER — LEVOTHYROXINE SODIUM 137 UG/1
TABLET ORAL
Status: CANCELLED | OUTPATIENT
Start: 2020-04-01

## 2020-04-01 RX ORDER — LEVOTHYROXINE SODIUM 137 UG/1
137 TABLET ORAL DAILY
Qty: 90 TABLET | Refills: 0 | Status: SHIPPED | OUTPATIENT
Start: 2020-04-01 | End: 2020-09-09 | Stop reason: SDUPTHER

## 2020-04-07 ENCOUNTER — OFFICE VISIT (OUTPATIENT)
Dept: FAMILY MEDICINE CLINIC | Facility: CLINIC | Age: 80
End: 2020-04-07

## 2020-04-07 DIAGNOSIS — G14 POST POLIOMYELITIS SYNDROME: Primary | ICD-10-CM

## 2020-04-07 DIAGNOSIS — M41.9 KYPHOSCOLIOSIS: ICD-10-CM

## 2020-04-07 DIAGNOSIS — F51.01 PRIMARY INSOMNIA: ICD-10-CM

## 2020-04-07 DIAGNOSIS — L89.149 PRESSURE INJURY OF SKIN OF LEFT LOWER BACK, UNSPECIFIED INJURY STAGE: ICD-10-CM

## 2020-04-07 DIAGNOSIS — F41.9 ANXIETY: ICD-10-CM

## 2020-04-07 DIAGNOSIS — E55.9 VITAMIN D DEFICIENCY: ICD-10-CM

## 2020-04-07 DIAGNOSIS — K21.9 GASTROESOPHAGEAL REFLUX DISEASE WITHOUT ESOPHAGITIS: ICD-10-CM

## 2020-04-07 PROCEDURE — 99443 PR PHYS/QHP TELEPHONE EVALUATION 21-30 MIN: CPT | Performed by: FAMILY MEDICINE

## 2020-04-07 RX ORDER — ZOLPIDEM TARTRATE 10 MG/1
10 TABLET ORAL NIGHTLY PRN
Qty: 30 TABLET | Refills: 0 | Status: SHIPPED | OUTPATIENT
Start: 2020-04-07 | End: 2020-05-14

## 2020-04-07 RX ORDER — DIAZEPAM 10 MG/1
10 TABLET ORAL NIGHTLY PRN
Qty: 30 TABLET | Refills: 0 | Status: SHIPPED | OUTPATIENT
Start: 2020-04-07 | End: 2020-05-14

## 2020-04-07 RX ORDER — NITROGLYCERIN 0.4 MG/1
0.4 TABLET SUBLINGUAL
Qty: 50 TABLET | Refills: 1 | Status: SHIPPED | OUTPATIENT
Start: 2020-04-07 | End: 2021-07-30 | Stop reason: SDUPTHER

## 2020-04-07 RX ORDER — GABAPENTIN 100 MG/1
CAPSULE ORAL
Qty: 150 CAPSULE | Refills: 2 | Status: SHIPPED | OUTPATIENT
Start: 2020-04-07 | End: 2020-11-24

## 2020-04-07 RX ORDER — CHOLECALCIFEROL (VITAMIN D3) 50 MCG
2000 TABLET ORAL DAILY
Qty: 90 TABLET | Refills: 0 | Status: SHIPPED | OUTPATIENT
Start: 2020-04-07 | End: 2020-09-09 | Stop reason: SDUPTHER

## 2020-04-07 RX ORDER — PROMETHAZINE HYDROCHLORIDE 25 MG/1
25 TABLET ORAL 2 TIMES DAILY PRN
Qty: 46 TABLET | Refills: 0 | Status: SHIPPED | OUTPATIENT
Start: 2020-04-07 | End: 2020-05-14

## 2020-04-07 NOTE — PROGRESS NOTES
Subjective   Karina Saleem is a 79 y.o. female.     Chief Complaint   Patient presents with   • Pain   • Wound Check   • Hypothyroidism         Current Outpatient Medications:   •  diazePAM (VALIUM) 10 MG tablet, Take 1 tablet by mouth At Night As Needed for Anxiety., Disp: 30 tablet, Rfl: 0  •  fentaNYL (DURAGESIC) 100 MCG/HR patch, Place 1 patch on the skin as directed by provider Every 72 (Seventy-Two) Hours., Disp: 10 patch, Rfl: 0  •  gabapentin (NEURONTIN) 100 MG capsule, TAKE TWO CAPSULES BY MOUTH TWICE DAILY & ONE CAPSULE AT BEDTIME, Disp: 150 capsule, Rfl: 2  •  HYDROcodone-acetaminophen (Norco) 7.5-325 MG per tablet, Take 1 tablet by mouth Daily As Needed for Moderate Pain ., Disp: 30 tablet, Rfl: 0  •  hydrocortisone-zinc oxide-bacitracin-nystatin cream, Apply 1 application topically to the appropriate area as directed 3 (Three) Times a Day As Needed (rash)., Disp: 120 g, Rfl: 2  •  levothyroxine (SYNTHROID, LEVOTHROID) 137 MCG tablet, Take 1 tablet by mouth Daily., Disp: 90 tablet, Rfl: 0  •  nitroglycerin (Nitrostat) 0.4 MG SL tablet, Place 1 tablet under the tongue Every 5 (Five) Minutes As Needed for Chest Pain (to max of 3 tabs per episode)., Disp: 50 tablet, Rfl: 1  •  promethazine (PHENERGAN) 25 MG tablet, Take 1 tablet by mouth 2 (Two) Times a Day As Needed for Nausea or Vomiting., Disp: 46 tablet, Rfl: 0  •  raNITIdine (ZANTAC) 150 MG tablet, Take 150 mg by mouth 2 (Two) Times a Day As Needed., Disp: , Rfl:   •  zolpidem (AMBIEN) 10 MG tablet, Take 1 tablet by mouth At Night As Needed for Sleep., Disp: 30 tablet, Rfl: 0  •  Cholecalciferol (VITAMIN D) 50 MCG (2000 UT) tablet, Take 2,000 Units by mouth Daily., Disp: 90 tablet, Rfl: 0  •  Naloxone HCl (EVZIO) 2 MG/0.4ML solution auto-injector, 2 mg., Disp: , Rfl:     Past Medical History:   Diagnosis Date   • Cataracts, bilateral    • Femur fracture, right (CMS/HCC)    • Hypothyroidism    • Insomnia    • Kyphosis    • Neuropathy     in feet   •  Osteoporosis    • Scoliosis        Past Surgical History:   Procedure Laterality Date   • APPENDECTOMY     • LIPOMA EXCISION Bilateral     breast   • TOTAL ABDOMINAL HYSTERECTOMY         Family History   Problem Relation Age of Onset   • Diabetes Mother    • Heart disease Mother         CHF   • Heart failure Father    • Heart disease Father    • COPD Father         BLACK LUNG   • Lymphoma Sister    • Lung cancer Brother    • Pancreatic cancer Brother    • Bone cancer Brother    • Cancer Brother        Social History     Socioeconomic History   • Marital status: Single     Spouse name: Not on file   • Number of children: Not on file   • Years of education: Not on file   • Highest education level: Not on file   Tobacco Use   • Smoking status: Never Smoker   • Smokeless tobacco: Never Used   Substance and Sexual Activity   • Alcohol use: No     Frequency: Never   • Drug use: No   • Sexual activity: Defer       80 y/o C female w/ Post Polio Syndrome/ Kyphoscoliosis/ paraplegia w/ chronic pain/ Hypothyroid and a very slow healing pressure wound on post thoracic area    Pt at home and has a neighbor (who wears a mask and gloves) who helps w/ her wound dressing changes 2x/wk since she saw Dr. Solis and he said it is healed but needing covered for protection---next appt is in Aug if all stays good    Pt states ins didn't cover the zantac or vit D and getting the zantac on her own but wasn't sure what to buy for the vit D----she has someone going to the store for her and she wipes down the boxes w/ alcohol when they come to her; pt wondering about hand     [facility] also stops in to check on her off/on  Pt needing some refills in 1-2 weeks and needing NTG refill now  Pt keeping active in her apt and no falls x couple yrs (per pt); pt has tv and can watch movies    Pt doing ok on current pain patch/ meds       The following portions of the patient's history were reviewed and updated as appropriate: allergies, current  medications, past family history, past medical history, past social history, past surgical history and problem list.    Review of Systems   Constitutional: Negative for activity change, appetite change, unexpected weight gain and unexpected weight loss.   Respiratory: Negative for cough.    Gastrointestinal: Positive for GERD and indigestion. Negative for nausea and vomiting.   Musculoskeletal: Positive for arthralgias, back pain, gait problem and myalgias. Negative for joint swelling.   Skin: Positive for skin lesions. Negative for rash.   Neurological: Negative for weakness.   Psychiatric/Behavioral: Negative for sleep disturbance and depressed mood.       There were no vitals filed for this visit.    Objective   Physical Exam   Constitutional: She is oriented to person, place, and time. No distress.   Pulmonary/Chest: No respiratory distress.   Neurological: She is alert and oriented to person, place, and time.   Psychiatric: She has a normal mood and affect. Her behavior is normal. Judgment and thought content normal.         Assessment/Plan   Karina was seen today for pain, wound check and hypothyroidism.    Diagnoses and all orders for this visit:    Post poliomyelitis syndrome    Pressure injury of skin of left lower back, unspecified injury stage    Kyphoscoliosis    Primary insomnia  -     zolpidem (AMBIEN) 10 MG tablet; Take 1 tablet by mouth At Night As Needed for Sleep.    Anxiety  -     diazePAM (VALIUM) 10 MG tablet; Take 1 tablet by mouth At Night As Needed for Anxiety.    Gastroesophageal reflux disease without esophagitis    Vitamin D deficiency    Other orders  -     Cholecalciferol (VITAMIN D) 50 MCG (2000 UT) tablet; Take 2,000 Units by mouth Daily.  -     nitroglycerin (Nitrostat) 0.4 MG SL tablet; Place 1 tablet under the tongue Every 5 (Five) Minutes As Needed for Chest Pain (to max of 3 tabs per episode).  -     gabapentin (NEURONTIN) 100 MG capsule; TAKE TWO CAPSULES BY MOUTH TWICE DAILY &  ONE CAPSULE AT BEDTIME  -     promethazine (PHENERGAN) 25 MG tablet; Take 1 tablet by mouth 2 (Two) Times a Day As Needed for Nausea or Vomiting.    Pt to try otc vit D 2,000 IU qday/ cont w/ otc zantac 150mg bid  Refills on NTG/ ambien/ phenerga/ gabapentin/ valium  Pt to use soap and water for handwashing which is a better solution---disc'd safety w/ masks/ wipe down of packages and others to shop for her  Labs in AUGUST    This visit has been rescheduled as a phone visit to comply with patient safety concerns in accordance with CDC recommendations. Total time of discussion was 25 minutes.

## 2020-04-30 DIAGNOSIS — G14 POST POLIOMYELITIS SYNDROME: ICD-10-CM

## 2020-04-30 RX ORDER — HYDROCODONE BITARTRATE AND ACETAMINOPHEN 7.5; 325 MG/1; MG/1
1 TABLET ORAL DAILY PRN
Qty: 30 TABLET | Refills: 0 | Status: SHIPPED | OUTPATIENT
Start: 2020-04-30 | End: 2020-05-29 | Stop reason: SDUPTHER

## 2020-05-14 DIAGNOSIS — F41.9 ANXIETY: ICD-10-CM

## 2020-05-14 DIAGNOSIS — F51.01 PRIMARY INSOMNIA: ICD-10-CM

## 2020-05-14 RX ORDER — ZOLPIDEM TARTRATE 10 MG/1
TABLET ORAL
Qty: 30 TABLET | Refills: 0 | Status: SHIPPED | OUTPATIENT
Start: 2020-05-14 | End: 2020-06-10 | Stop reason: SDUPTHER

## 2020-05-14 RX ORDER — DIAZEPAM 10 MG/1
TABLET ORAL
Qty: 30 TABLET | Refills: 0 | Status: SHIPPED | OUTPATIENT
Start: 2020-05-14 | End: 2020-06-10 | Stop reason: SDUPTHER

## 2020-05-14 RX ORDER — PROMETHAZINE HYDROCHLORIDE 25 MG/1
TABLET ORAL
Qty: 46 TABLET | Refills: 0 | Status: SHIPPED | OUTPATIENT
Start: 2020-05-14 | End: 2020-06-10 | Stop reason: SDUPTHER

## 2020-05-21 ENCOUNTER — TELEPHONE (OUTPATIENT)
Dept: FAMILY MEDICINE CLINIC | Facility: CLINIC | Age: 80
End: 2020-05-21

## 2020-05-21 NOTE — TELEPHONE ENCOUNTER
Pt markus.  states she is in [facility] for pneumonia and now the doctor is saying she is mentally incompetent and will not release her.  She is asking to speak to you.  She did not know her phone number or room number.  I called and her room number is 2682.  [facility] # [phone]

## 2020-05-28 DIAGNOSIS — G14 POST POLIOMYELITIS SYNDROME: ICD-10-CM

## 2020-05-29 DIAGNOSIS — G14 POST POLIOMYELITIS SYNDROME: ICD-10-CM

## 2020-05-29 RX ORDER — HYDROCODONE BITARTRATE AND ACETAMINOPHEN 7.5; 325 MG/1; MG/1
1 TABLET ORAL DAILY PRN
Qty: 30 TABLET | Refills: 0 | Status: SHIPPED | OUTPATIENT
Start: 2020-05-29 | End: 2020-06-26 | Stop reason: SDUPTHER

## 2020-05-29 RX ORDER — HYDROCODONE BITARTRATE AND ACETAMINOPHEN 7.5; 325 MG/1; MG/1
1 TABLET ORAL DAILY PRN
Qty: 30 TABLET | Refills: 0 | OUTPATIENT
Start: 2020-05-29

## 2020-06-10 ENCOUNTER — TELEPHONE (OUTPATIENT)
Dept: FAMILY MEDICINE CLINIC | Facility: CLINIC | Age: 80
End: 2020-06-10

## 2020-06-10 ENCOUNTER — OFFICE VISIT (OUTPATIENT)
Dept: FAMILY MEDICINE CLINIC | Facility: CLINIC | Age: 80
End: 2020-06-10

## 2020-06-10 DIAGNOSIS — F51.01 PRIMARY INSOMNIA: ICD-10-CM

## 2020-06-10 DIAGNOSIS — F41.9 ANXIETY: ICD-10-CM

## 2020-06-10 DIAGNOSIS — M41.9 KYPHOSCOLIOSIS: ICD-10-CM

## 2020-06-10 DIAGNOSIS — G14 POST POLIOMYELITIS SYNDROME: Primary | ICD-10-CM

## 2020-06-10 PROCEDURE — 99214 OFFICE O/P EST MOD 30 MIN: CPT | Performed by: FAMILY MEDICINE

## 2020-06-10 RX ORDER — PROMETHAZINE HYDROCHLORIDE 25 MG/1
25 TABLET ORAL EVERY 8 HOURS PRN
Qty: 46 TABLET | Refills: 0 | Status: SHIPPED | OUTPATIENT
Start: 2020-06-10 | End: 2020-07-08 | Stop reason: SDUPTHER

## 2020-06-10 RX ORDER — ZOLPIDEM TARTRATE 10 MG/1
10 TABLET ORAL NIGHTLY PRN
Qty: 30 TABLET | Refills: 0 | Status: SHIPPED | OUTPATIENT
Start: 2020-06-10 | End: 2020-07-08 | Stop reason: SDUPTHER

## 2020-06-10 RX ORDER — FENTANYL 100 UG/H
1 PATCH TRANSDERMAL
Qty: 10 PATCH | Refills: 0 | Status: SHIPPED | OUTPATIENT
Start: 2020-06-10 | End: 2020-07-08 | Stop reason: SDUPTHER

## 2020-06-10 RX ORDER — DIAZEPAM 10 MG/1
10 TABLET ORAL NIGHTLY PRN
Qty: 30 TABLET | Refills: 0 | Status: SHIPPED | OUTPATIENT
Start: 2020-06-10 | End: 2020-07-08 | Stop reason: SDUPTHER

## 2020-06-10 NOTE — PROGRESS NOTES
Subjective   Karina Saleem is a 79 y.o. female.     Chief Complaint   Patient presents with   • Pneumonia     F/U from pneumonia    • Back Pain   • Insomnia   • Kyphoscoliosis         Current Outpatient Medications:   •  Cholecalciferol (VITAMIN D) 50 MCG (2000 UT) tablet, Take 2,000 Units by mouth Daily., Disp: 90 tablet, Rfl: 0  •  diazePAM (VALIUM) 10 MG tablet, Take 1 tablet by mouth At Night As Needed for Anxiety., Disp: 30 tablet, Rfl: 0  •  fentaNYL (DURAGESIC) 100 MCG/HR patch, Place 1 patch on the skin as directed by provider Every 72 (Seventy-Two) Hours., Disp: 10 patch, Rfl: 0  •  gabapentin (NEURONTIN) 100 MG capsule, TAKE TWO CAPSULES BY MOUTH TWICE DAILY & ONE CAPSULE AT BEDTIME, Disp: 150 capsule, Rfl: 2  •  HYDROcodone-acetaminophen (Norco) 7.5-325 MG per tablet, Take 1 tablet by mouth Daily As Needed for Moderate Pain ., Disp: 30 tablet, Rfl: 0  •  hydrocortisone-zinc oxide-bacitracin-nystatin cream, Apply 1 application topically to the appropriate area as directed 3 (Three) Times a Day As Needed (rash)., Disp: 120 g, Rfl: 2  •  levothyroxine (SYNTHROID, LEVOTHROID) 137 MCG tablet, Take 1 tablet by mouth Daily., Disp: 90 tablet, Rfl: 0  •  promethazine (PHENERGAN) 25 MG tablet, Take 1 tablet by mouth Every 8 (Eight) Hours As Needed for Nausea or Vomiting., Disp: 46 tablet, Rfl: 0  •  zolpidem (AMBIEN) 10 MG tablet, Take 1 tablet by mouth At Night As Needed for Sleep., Disp: 30 tablet, Rfl: 0  •  Naloxone HCl (EVZIO) 2 MG/0.4ML solution auto-injector, 2 mg., Disp: , Rfl:   •  nitroglycerin (Nitrostat) 0.4 MG SL tablet, Place 1 tablet under the tongue Every 5 (Five) Minutes As Needed for Chest Pain (to max of 3 tabs per episode)., Disp: 50 tablet, Rfl: 1  •  raNITIdine (ZANTAC) 150 MG tablet, Take 150 mg by mouth 2 (Two) Times a Day As Needed., Disp: , Rfl:     Past Medical History:   Diagnosis Date   • Cataracts, bilateral    • Femur fracture, right (CMS/HCC)    • Hypothyroidism    • Insomnia     • Kyphosis    • Neuropathy     in feet   • Osteoporosis    • Scoliosis        Past Surgical History:   Procedure Laterality Date   • APPENDECTOMY     • LIPOMA EXCISION Bilateral     breast   • TOTAL ABDOMINAL HYSTERECTOMY         Family History   Problem Relation Age of Onset   • Diabetes Mother    • Heart disease Mother         CHF   • Heart failure Father    • Heart disease Father    • COPD Father         BLACK LUNG   • Lymphoma Sister    • Lung cancer Brother    • Pancreatic cancer Brother    • Bone cancer Brother    • Cancer Brother        Social History     Socioeconomic History   • Marital status: Single     Spouse name: Not on file   • Number of children: Not on file   • Years of education: Not on file   • Highest education level: Not on file   Tobacco Use   • Smoking status: Never Smoker   • Smokeless tobacco: Never Used   Substance and Sexual Activity   • Alcohol use: No     Frequency: Never   • Drug use: No   • Sexual activity: Defer       78 y/o C female on phone for f/u from hosp for PNA-----chronic pain/ post polio Syndrome    Pt states she is done w/ the abx ------[facility] coming in 2 times/ wk to help w/ chores and PTx once a week    Pt has a friend who keeps an eye on pt's back area where she gets skin breakdown---she comes twice a week and puts a dry bandage on the area; it is still closed up per pt       The following portions of the patient's history were reviewed and updated as appropriate: allergies, current medications, past family history, past medical history, past social history, past surgical history and problem list.    Review of Systems   Constitutional: Negative for activity change, appetite change, fatigue and unexpected weight gain.   Respiratory: Negative for cough, chest tightness and shortness of breath.    Cardiovascular: Negative for chest pain, palpitations and leg swelling.   Gastrointestinal: Negative for constipation and diarrhea.   Genitourinary: Negative for frequency, urgency  and urinary incontinence.   Musculoskeletal: Positive for arthralgias and back pain. Negative for myalgias.   Skin: Negative for rash, skin lesions and bruise.   Neurological: Negative for dizziness, facial asymmetry, speech difficulty, weakness, light-headedness, headache, memory problem and confusion.   Psychiatric/Behavioral: Negative for dysphoric mood, sleep disturbance and depressed mood.       There were no vitals filed for this visit.    Objective   Physical Exam   Constitutional: She is oriented to person, place, and time. She appears well-developed and well-nourished. No distress.   Pulmonary/Chest: No respiratory distress.   Neurological: She is alert and oriented to person, place, and time.   Psychiatric: She has a normal mood and affect. Her behavior is normal. Judgment and thought content normal.         Assessment/Plan   Karina was seen today for pneumonia, back pain, insomnia and kyphoscoliosis.    Diagnoses and all orders for this visit:    Post poliomyelitis syndrome  -     fentaNYL (DURAGESIC) 100 MCG/HR patch; Place 1 patch on the skin as directed by provider Every 72 (Seventy-Two) Hours.    Kyphoscoliosis    Primary insomnia  -     zolpidem (AMBIEN) 10 MG tablet; Take 1 tablet by mouth At Night As Needed for Sleep.    Anxiety  -     diazePAM (VALIUM) 10 MG tablet; Take 1 tablet by mouth At Night As Needed for Anxiety.    Other orders  -     promethazine (PHENERGAN) 25 MG tablet; Take 1 tablet by mouth Every 8 (Eight) Hours As Needed for Nausea or Vomiting.    CONT w/ HHC/ PTx to increase strength/ endurance  Pain meds sent in    You have chosen to receive care through a telephone visit. Do you consent to use a telephone visit for your medical care today? {YES   This visit has been rescheduled as a phone visit to comply with patient safety concerns in accordance with CDC recommendations. Total time of discussion was 18 minutes.

## 2020-06-10 NOTE — TELEPHONE ENCOUNTER
PA for promethazine was approved for 46 per 16 days per fax from [facility] pharmacy.

## 2020-06-26 ENCOUNTER — TELEPHONE (OUTPATIENT)
Dept: FAMILY MEDICINE CLINIC | Facility: CLINIC | Age: 80
End: 2020-06-26

## 2020-06-26 DIAGNOSIS — G14 POST POLIOMYELITIS SYNDROME: ICD-10-CM

## 2020-06-26 RX ORDER — HYDROCODONE BITARTRATE AND ACETAMINOPHEN 7.5; 325 MG/1; MG/1
1 TABLET ORAL DAILY PRN
Qty: 30 TABLET | Refills: 0 | Status: SHIPPED | OUTPATIENT
Start: 2020-06-26 | End: 2020-07-24 | Stop reason: SDUPTHER

## 2020-06-29 ENCOUNTER — TELEPHONE (OUTPATIENT)
Dept: FAMILY MEDICINE CLINIC | Facility: CLINIC | Age: 80
End: 2020-06-29

## 2020-06-29 NOTE — TELEPHONE ENCOUNTER
[facility] .[phone]    Just calling to report some high BP readings. Pt is not having or c/o any other symptoms.    6/29 - 152/98   6/16 - 140/100 & 142/97

## 2020-07-08 DIAGNOSIS — F51.01 PRIMARY INSOMNIA: ICD-10-CM

## 2020-07-08 DIAGNOSIS — G14 POST POLIOMYELITIS SYNDROME: ICD-10-CM

## 2020-07-08 DIAGNOSIS — F41.9 ANXIETY: ICD-10-CM

## 2020-07-08 RX ORDER — FENTANYL 100 UG/H
1 PATCH TRANSDERMAL
Qty: 10 PATCH | Refills: 0 | Status: SHIPPED | OUTPATIENT
Start: 2020-07-08 | End: 2020-08-11 | Stop reason: SDUPTHER

## 2020-07-08 RX ORDER — PROMETHAZINE HYDROCHLORIDE 25 MG/1
25 TABLET ORAL EVERY 8 HOURS PRN
Qty: 46 TABLET | Refills: 0 | Status: SHIPPED | OUTPATIENT
Start: 2020-07-08 | End: 2020-08-06 | Stop reason: SDUPTHER

## 2020-07-08 RX ORDER — ZOLPIDEM TARTRATE 10 MG/1
10 TABLET ORAL NIGHTLY PRN
Qty: 30 TABLET | Refills: 0 | Status: SHIPPED | OUTPATIENT
Start: 2020-07-08 | End: 2020-08-06 | Stop reason: SDUPTHER

## 2020-07-08 RX ORDER — DIAZEPAM 10 MG/1
10 TABLET ORAL NIGHTLY PRN
Qty: 30 TABLET | Refills: 0 | Status: SHIPPED | OUTPATIENT
Start: 2020-07-08 | End: 2020-08-06 | Stop reason: SDUPTHER

## 2020-07-08 NOTE — TELEPHONE ENCOUNTER
Ambien 10mg  Valium 10mg  Phenergan 25mg  Fentanyl 100mcg/hr    [facility] [place]    Last Seen 6/10/2020  No future Appt sched.

## 2020-07-10 ENCOUNTER — TELEPHONE (OUTPATIENT)
Dept: FAMILY MEDICINE CLINIC | Facility: CLINIC | Age: 80
End: 2020-07-10

## 2020-07-10 RX ORDER — LOPERAMIDE HYDROCHLORIDE 2 MG/1
2 CAPSULE ORAL 4 TIMES DAILY PRN
Qty: 40 CAPSULE | Refills: 1 | Status: SHIPPED | OUTPATIENT
Start: 2020-07-10 | End: 2021-02-26

## 2020-07-24 DIAGNOSIS — G14 POST POLIOMYELITIS SYNDROME: ICD-10-CM

## 2020-07-24 RX ORDER — HYDROCODONE BITARTRATE AND ACETAMINOPHEN 7.5; 325 MG/1; MG/1
1 TABLET ORAL DAILY PRN
Qty: 30 TABLET | Refills: 0 | Status: SHIPPED | OUTPATIENT
Start: 2020-07-24 | End: 2020-08-24 | Stop reason: SDUPTHER

## 2020-07-29 ENCOUNTER — TELEPHONE (OUTPATIENT)
Dept: FAMILY MEDICINE CLINIC | Facility: CLINIC | Age: 80
End: 2020-07-29

## 2020-08-06 DIAGNOSIS — F51.01 PRIMARY INSOMNIA: ICD-10-CM

## 2020-08-06 DIAGNOSIS — F41.9 ANXIETY: ICD-10-CM

## 2020-08-06 RX ORDER — ZOLPIDEM TARTRATE 10 MG/1
10 TABLET ORAL NIGHTLY PRN
Qty: 30 TABLET | Refills: 0 | Status: SHIPPED | OUTPATIENT
Start: 2020-08-06 | End: 2020-09-03 | Stop reason: SDUPTHER

## 2020-08-06 RX ORDER — DIAZEPAM 10 MG/1
10 TABLET ORAL NIGHTLY PRN
Qty: 30 TABLET | Refills: 0 | Status: SHIPPED | OUTPATIENT
Start: 2020-08-06 | End: 2020-09-03 | Stop reason: SDUPTHER

## 2020-08-06 RX ORDER — PROMETHAZINE HYDROCHLORIDE 25 MG/1
25 TABLET ORAL EVERY 8 HOURS PRN
Qty: 46 TABLET | Refills: 0 | Status: SHIPPED | OUTPATIENT
Start: 2020-08-06 | End: 2020-09-03 | Stop reason: SDUPTHER

## 2020-08-06 NOTE — TELEPHONE ENCOUNTER
Diazepam 10mg  Promethazine 25mg  Zolpidem 10mg    [facility]    Last Seen 6/10/2020  No future appt scheduled.

## 2020-08-11 ENCOUNTER — TELEPHONE (OUTPATIENT)
Dept: FAMILY MEDICINE CLINIC | Facility: CLINIC | Age: 80
End: 2020-08-11

## 2020-08-11 DIAGNOSIS — G14 POST POLIOMYELITIS SYNDROME: ICD-10-CM

## 2020-08-11 RX ORDER — FENTANYL 100 UG/H
1 PATCH TRANSDERMAL
Qty: 10 PATCH | Refills: 0 | Status: SHIPPED | OUTPATIENT
Start: 2020-08-11 | End: 2020-09-09 | Stop reason: SDUPTHER

## 2020-08-24 DIAGNOSIS — G14 POST POLIOMYELITIS SYNDROME: ICD-10-CM

## 2020-08-24 RX ORDER — HYDROCODONE BITARTRATE AND ACETAMINOPHEN 7.5; 325 MG/1; MG/1
1 TABLET ORAL DAILY PRN
Qty: 30 TABLET | Refills: 0 | Status: SHIPPED | OUTPATIENT
Start: 2020-08-24 | End: 2020-09-24 | Stop reason: SDUPTHER

## 2020-09-03 DIAGNOSIS — F51.01 PRIMARY INSOMNIA: ICD-10-CM

## 2020-09-03 DIAGNOSIS — F41.9 ANXIETY: ICD-10-CM

## 2020-09-03 RX ORDER — ZOLPIDEM TARTRATE 10 MG/1
10 TABLET ORAL NIGHTLY PRN
Qty: 30 TABLET | Refills: 0 | Status: CANCELLED | OUTPATIENT
Start: 2020-09-03

## 2020-09-03 RX ORDER — DIAZEPAM 10 MG/1
10 TABLET ORAL NIGHTLY PRN
Qty: 30 TABLET | Refills: 0 | Status: SHIPPED | OUTPATIENT
Start: 2020-09-03 | End: 2020-10-01

## 2020-09-03 RX ORDER — DIAZEPAM 10 MG/1
10 TABLET ORAL NIGHTLY PRN
Qty: 30 TABLET | Refills: 0 | Status: CANCELLED | OUTPATIENT
Start: 2020-09-03

## 2020-09-03 RX ORDER — PROMETHAZINE HYDROCHLORIDE 25 MG/1
25 TABLET ORAL EVERY 8 HOURS PRN
Qty: 46 TABLET | Refills: 0 | Status: CANCELLED | OUTPATIENT
Start: 2020-09-03

## 2020-09-03 RX ORDER — PROMETHAZINE HYDROCHLORIDE 25 MG/1
25 TABLET ORAL EVERY 8 HOURS PRN
Qty: 46 TABLET | Refills: 0 | Status: SHIPPED | OUTPATIENT
Start: 2020-09-03 | End: 2020-11-02

## 2020-09-03 RX ORDER — ZOLPIDEM TARTRATE 10 MG/1
10 TABLET ORAL NIGHTLY PRN
Qty: 30 TABLET | Refills: 0 | Status: SHIPPED | OUTPATIENT
Start: 2020-09-03 | End: 2020-10-01

## 2020-09-03 NOTE — TELEPHONE ENCOUNTER
Diazepam 10mg  Promethazine 25mg  Zolpidem 10mg    [facility]    Last Seen 6/10/20  No future appt sched

## 2020-09-09 ENCOUNTER — OFFICE VISIT (OUTPATIENT)
Dept: FAMILY MEDICINE CLINIC | Facility: CLINIC | Age: 80
End: 2020-09-09

## 2020-09-09 DIAGNOSIS — E03.9 ACQUIRED HYPOTHYROIDISM: ICD-10-CM

## 2020-09-09 DIAGNOSIS — F51.01 PRIMARY INSOMNIA: ICD-10-CM

## 2020-09-09 DIAGNOSIS — E55.9 VITAMIN D DEFICIENCY: ICD-10-CM

## 2020-09-09 DIAGNOSIS — M41.9 KYPHOSCOLIOSIS: ICD-10-CM

## 2020-09-09 DIAGNOSIS — G14 POST POLIOMYELITIS SYNDROME: Primary | ICD-10-CM

## 2020-09-09 PROCEDURE — 99214 OFFICE O/P EST MOD 30 MIN: CPT | Performed by: FAMILY MEDICINE

## 2020-09-09 RX ORDER — FENTANYL 100 UG/H
1 PATCH TRANSDERMAL
Qty: 10 PATCH | Refills: 0 | Status: SHIPPED | OUTPATIENT
Start: 2020-09-09 | End: 2020-10-12 | Stop reason: SDUPTHER

## 2020-09-09 RX ORDER — LEVOTHYROXINE SODIUM 137 UG/1
137 TABLET ORAL DAILY
Qty: 90 TABLET | Refills: 0 | Status: SHIPPED | OUTPATIENT
Start: 2020-09-09 | End: 2020-11-25

## 2020-09-09 RX ORDER — CHOLECALCIFEROL (VITAMIN D3) 50 MCG
2000 TABLET ORAL DAILY
Qty: 90 TABLET | Refills: 0 | Status: SHIPPED | OUTPATIENT
Start: 2020-09-09 | End: 2020-11-24 | Stop reason: SDUPTHER

## 2020-09-09 NOTE — PROGRESS NOTES
Subjective   Karina Saleem is a 80 y.o. female.     Chief Complaint   Patient presents with   • Pain   • Hypothyroidism   • Insomnia         Current Outpatient Medications:   •  Cholecalciferol (VITAMIN D) 50 MCG (2000 UT) tablet, Take 2,000 Units by mouth Daily., Disp: 90 tablet, Rfl: 0  •  diazePAM (VALIUM) 10 MG tablet, Take 1 tablet by mouth At Night As Needed for Anxiety., Disp: 30 tablet, Rfl: 0  •  fentaNYL (DURAGESIC) 100 MCG/HR patch, Place 1 patch on the skin as directed by provider Every 72 (Seventy-Two) Hours., Disp: 10 patch, Rfl: 0  •  gabapentin (NEURONTIN) 100 MG capsule, TAKE TWO CAPSULES BY MOUTH TWICE DAILY & ONE CAPSULE AT BEDTIME, Disp: 150 capsule, Rfl: 2  •  HYDROcodone-acetaminophen (Norco) 7.5-325 MG per tablet, Take 1 tablet by mouth Daily As Needed for Moderate Pain ., Disp: 30 tablet, Rfl: 0  •  hydrocortisone-bacitracin-zinc oxide-nystatin (MAGIC BARRIER), Apply 1 application topically to the appropriate area as directed 2 (Two) Times a Day As Needed (rash)., Disp: 120 g, Rfl: 3  •  levothyroxine (SYNTHROID, LEVOTHROID) 137 MCG tablet, Take 1 tablet by mouth Daily., Disp: 90 tablet, Rfl: 0  •  loperamide (IMODIUM) 2 MG capsule, Take 1 capsule by mouth 4 (Four) Times a Day As Needed for Diarrhea., Disp: 40 capsule, Rfl: 1  •  Naloxone HCl (EVZIO) 2 MG/0.4ML solution auto-injector, 2 mg., Disp: , Rfl:   •  nitroglycerin (Nitrostat) 0.4 MG SL tablet, Place 1 tablet under the tongue Every 5 (Five) Minutes As Needed for Chest Pain (to max of 3 tabs per episode)., Disp: 50 tablet, Rfl: 1  •  promethazine (PHENERGAN) 25 MG tablet, Take 1 tablet by mouth Every 8 (Eight) Hours As Needed for Nausea or Vomiting., Disp: 46 tablet, Rfl: 0  •  raNITIdine (ZANTAC) 150 MG tablet, Take 150 mg by mouth 2 (Two) Times a Day As Needed., Disp: , Rfl:   •  zolpidem (AMBIEN) 10 MG tablet, Take 1 tablet by mouth At Night As Needed for Sleep., Disp: 30 tablet, Rfl: 0    Past Medical History:   Diagnosis Date    • Cataracts, bilateral    • Femur fracture, right    • Hypothyroidism    • Insomnia    • Kyphosis    • Neuropathy     in feet   • Osteoporosis    • Scoliosis        Past Surgical History:   Procedure Laterality Date   • APPENDECTOMY     • LIPOMA EXCISION Bilateral     breast   • TOTAL ABDOMINAL HYSTERECTOMY         Family History   Problem Relation Age of Onset   • Diabetes Mother    • Heart disease Mother         CHF   • Heart failure Father    • Heart disease Father    • COPD Father         BLACK LUNG   • Lymphoma Sister    • Lung cancer Brother    • Pancreatic cancer Brother    • Bone cancer Brother    • Cancer Brother        Social History     Socioeconomic History   • Marital status: Single     Spouse name: Not on file   • Number of children: Not on file   • Years of education: Not on file   • Highest education level: Not on file   Tobacco Use   • Smoking status: Never Smoker   • Smokeless tobacco: Never Used   Substance and Sexual Activity   • Alcohol use: No     Frequency: Never   • Drug use: No   • Sexual activity: Defer       79 y/o C female w/ post polio syndrome/ chronic pain/ kyphoscoliosis/ insomnia/ Hypothyroid    Pt doing fine on current meds/ doses  Pt has a friend get her groceries for her    Pt will see the wound  at the end of Sept just to keep an eye on her back       The following portions of the patient's history were reviewed and updated as appropriate: allergies, current medications, past family history, past medical history, past social history, past surgical history and problem list.    Review of Systems   Constitutional: Negative for activity change, appetite change, unexpected weight gain and unexpected weight loss.   Respiratory: Negative for cough, shortness of breath and wheezing.    Cardiovascular: Negative for chest pain, palpitations and leg swelling.   Gastrointestinal: Negative for constipation, diarrhea and GERD.   Musculoskeletal: Positive for arthralgias, back pain, gait  problem and myalgias.   Skin: Negative for color change, rash and skin lesions.   Psychiatric/Behavioral: Negative for dysphoric mood, sleep disturbance and depressed mood.       There were no vitals filed for this visit.    Objective   Physical Exam   Constitutional: She is oriented to person, place, and time. No distress.   Pulmonary/Chest: No respiratory distress.   Neurological: She is alert and oriented to person, place, and time.   Psychiatric: She has a normal mood and affect. Her behavior is normal. Judgment and thought content normal.         Assessment/Plan   Karina was seen today for pain, hypothyroidism and insomnia.    Diagnoses and all orders for this visit:    Post poliomyelitis syndrome  -     fentaNYL (DURAGESIC) 100 MCG/HR patch; Place 1 patch on the skin as directed by provider Every 72 (Seventy-Two) Hours.    Acquired hypothyroidism    Kyphoscoliosis    Vitamin D deficiency    Other orders  -     hydrocortisone-bacitracin-zinc oxide-nystatin (MAGIC BARRIER); Apply 1 application topically to the appropriate area as directed 2 (Two) Times a Day As Needed (rash).  -     levothyroxine (SYNTHROID, LEVOTHROID) 137 MCG tablet; Take 1 tablet by mouth Daily.  -     Cholecalciferol (VITAMIN D) 50 MCG (2000 UT) tablet; Take 2,000 Units by mouth Daily.      Pt due for labs  Flu shot this fall    You have chosen to receive care through a telephone visit. Do you consent to use a telephone visit for your medical care today? {YES   This visit has been rescheduled as a phone visit to comply with patient safety concerns in accordance with CDC recommendations. Total time of discussion was  20 minutes.

## 2020-09-14 NOTE — TELEPHONE ENCOUNTER
levothryoxine    [facility]    Last Seen 1/7/2020  No future appt sched.  Labs 1/2020   Yes - the patient is able to be screened

## 2020-09-24 DIAGNOSIS — G14 POST POLIOMYELITIS SYNDROME: ICD-10-CM

## 2020-09-24 RX ORDER — HYDROCODONE BITARTRATE AND ACETAMINOPHEN 7.5; 325 MG/1; MG/1
1 TABLET ORAL DAILY PRN
Qty: 30 TABLET | Refills: 0 | Status: SHIPPED | OUTPATIENT
Start: 2020-09-24 | End: 2020-10-23 | Stop reason: SDUPTHER

## 2020-10-01 DIAGNOSIS — F51.01 PRIMARY INSOMNIA: ICD-10-CM

## 2020-10-01 DIAGNOSIS — F41.9 ANXIETY: ICD-10-CM

## 2020-10-01 RX ORDER — ZOLPIDEM TARTRATE 10 MG/1
TABLET ORAL
Qty: 30 TABLET | Refills: 0 | Status: SHIPPED | OUTPATIENT
Start: 2020-10-01 | End: 2020-10-28 | Stop reason: SDUPTHER

## 2020-10-01 RX ORDER — DIAZEPAM 10 MG/1
TABLET ORAL
Qty: 30 TABLET | Refills: 0 | Status: SHIPPED | OUTPATIENT
Start: 2020-10-01 | End: 2020-10-28 | Stop reason: SDUPTHER

## 2020-10-12 DIAGNOSIS — G14 POST POLIOMYELITIS SYNDROME: ICD-10-CM

## 2020-10-12 RX ORDER — FENTANYL 100 UG/H
1 PATCH TRANSDERMAL
Qty: 10 PATCH | Refills: 0 | Status: SHIPPED | OUTPATIENT
Start: 2020-10-12 | End: 2020-11-24 | Stop reason: SDUPTHER

## 2020-10-23 DIAGNOSIS — G14 POST POLIOMYELITIS SYNDROME: ICD-10-CM

## 2020-10-23 RX ORDER — HYDROCODONE BITARTRATE AND ACETAMINOPHEN 7.5; 325 MG/1; MG/1
1 TABLET ORAL DAILY PRN
Qty: 30 TABLET | Refills: 0 | Status: SHIPPED | OUTPATIENT
Start: 2020-10-23 | End: 2020-11-24 | Stop reason: SDUPTHER

## 2020-10-28 DIAGNOSIS — F51.01 PRIMARY INSOMNIA: ICD-10-CM

## 2020-10-28 DIAGNOSIS — F41.9 ANXIETY: ICD-10-CM

## 2020-10-28 RX ORDER — DIAZEPAM 10 MG/1
10 TABLET ORAL NIGHTLY PRN
Qty: 30 TABLET | Refills: 0 | Status: SHIPPED | OUTPATIENT
Start: 2020-10-28 | End: 2020-11-24 | Stop reason: SDUPTHER

## 2020-10-28 RX ORDER — ZOLPIDEM TARTRATE 10 MG/1
10 TABLET ORAL NIGHTLY PRN
Qty: 30 TABLET | Refills: 0 | Status: SHIPPED | OUTPATIENT
Start: 2020-10-28 | End: 2020-11-24 | Stop reason: SDUPTHER

## 2020-11-02 RX ORDER — PROMETHAZINE HYDROCHLORIDE 25 MG/1
TABLET ORAL
Qty: 46 TABLET | Refills: 0 | Status: SHIPPED | OUTPATIENT
Start: 2020-11-02 | End: 2020-12-28

## 2020-11-02 NOTE — TELEPHONE ENCOUNTER
Phenergan    [facility]    Seen 9/9  sched 11/9  Labs due in 6/2020? Ordered-Not done  No lipid on file

## 2020-11-24 ENCOUNTER — OFFICE VISIT (OUTPATIENT)
Dept: FAMILY MEDICINE CLINIC | Facility: CLINIC | Age: 80
End: 2020-11-24

## 2020-11-24 VITALS
OXYGEN SATURATION: 96 % | TEMPERATURE: 96.9 F | RESPIRATION RATE: 16 BRPM | DIASTOLIC BLOOD PRESSURE: 78 MMHG | HEART RATE: 96 BPM | SYSTOLIC BLOOD PRESSURE: 148 MMHG | HEIGHT: 62 IN

## 2020-11-24 DIAGNOSIS — E03.9 ACQUIRED HYPOTHYROIDISM: ICD-10-CM

## 2020-11-24 DIAGNOSIS — G14 POST POLIOMYELITIS SYNDROME: ICD-10-CM

## 2020-11-24 DIAGNOSIS — F51.01 PRIMARY INSOMNIA: ICD-10-CM

## 2020-11-24 DIAGNOSIS — Z23 NEED FOR INFLUENZA VACCINATION: ICD-10-CM

## 2020-11-24 DIAGNOSIS — F41.9 ANXIETY: ICD-10-CM

## 2020-11-24 DIAGNOSIS — M62.81 MUSCLE WEAKNESS (GENERALIZED): ICD-10-CM

## 2020-11-24 DIAGNOSIS — M81.8 DISUSE OSTEOPOROSIS: ICD-10-CM

## 2020-11-24 DIAGNOSIS — Z00.00 MEDICARE ANNUAL WELLNESS VISIT, INITIAL: Primary | ICD-10-CM

## 2020-11-24 DIAGNOSIS — E55.9 VITAMIN D DEFICIENCY: ICD-10-CM

## 2020-11-24 PROCEDURE — 84439 ASSAY OF FREE THYROXINE: CPT | Performed by: FAMILY MEDICINE

## 2020-11-24 PROCEDURE — 99213 OFFICE O/P EST LOW 20 MIN: CPT | Performed by: FAMILY MEDICINE

## 2020-11-24 PROCEDURE — G0008 ADMIN INFLUENZA VIRUS VAC: HCPCS | Performed by: FAMILY MEDICINE

## 2020-11-24 PROCEDURE — 84443 ASSAY THYROID STIM HORMONE: CPT | Performed by: FAMILY MEDICINE

## 2020-11-24 PROCEDURE — 80053 COMPREHEN METABOLIC PANEL: CPT | Performed by: FAMILY MEDICINE

## 2020-11-24 PROCEDURE — 85025 COMPLETE CBC W/AUTO DIFF WBC: CPT | Performed by: FAMILY MEDICINE

## 2020-11-24 PROCEDURE — 90694 VACC AIIV4 NO PRSRV 0.5ML IM: CPT | Performed by: FAMILY MEDICINE

## 2020-11-24 PROCEDURE — 36415 COLL VENOUS BLD VENIPUNCTURE: CPT | Performed by: FAMILY MEDICINE

## 2020-11-24 PROCEDURE — 82306 VITAMIN D 25 HYDROXY: CPT | Performed by: FAMILY MEDICINE

## 2020-11-24 PROCEDURE — G0438 PPPS, INITIAL VISIT: HCPCS | Performed by: FAMILY MEDICINE

## 2020-11-24 RX ORDER — DIAZEPAM 10 MG/1
10 TABLET ORAL NIGHTLY PRN
Qty: 30 TABLET | Refills: 0 | Status: SHIPPED | OUTPATIENT
Start: 2020-11-24 | End: 2020-12-16 | Stop reason: SDUPTHER

## 2020-11-24 RX ORDER — HYDROCODONE BITARTRATE AND ACETAMINOPHEN 7.5; 325 MG/1; MG/1
1 TABLET ORAL DAILY PRN
Qty: 30 TABLET | Refills: 0 | Status: SHIPPED | OUTPATIENT
Start: 2020-11-24 | End: 2020-12-16 | Stop reason: SDUPTHER

## 2020-11-24 RX ORDER — FAMOTIDINE 40 MG/1
40 TABLET, FILM COATED ORAL DAILY PRN
Qty: 30 TABLET | Refills: 0 | Status: SHIPPED | OUTPATIENT
Start: 2020-11-24 | End: 2021-02-26

## 2020-11-24 RX ORDER — GABAPENTIN 100 MG/1
CAPSULE ORAL
Qty: 450 CAPSULE | Refills: 2 | Status: SHIPPED | OUTPATIENT
Start: 2020-11-24 | End: 2020-11-24

## 2020-11-24 RX ORDER — GABAPENTIN 100 MG/1
CAPSULE ORAL
Qty: 450 CAPSULE | Refills: 2 | Status: SHIPPED | OUTPATIENT
Start: 2020-11-24 | End: 2021-10-19

## 2020-11-24 RX ORDER — FENTANYL 100 UG/H
1 PATCH TRANSDERMAL
Qty: 10 PATCH | Refills: 0 | Status: SHIPPED | OUTPATIENT
Start: 2020-11-24 | End: 2020-12-16 | Stop reason: SDUPTHER

## 2020-11-24 RX ORDER — CHOLECALCIFEROL (VITAMIN D3) 50 MCG
2000 TABLET ORAL DAILY
Qty: 90 TABLET | Refills: 1 | Status: SHIPPED | OUTPATIENT
Start: 2020-11-24 | End: 2021-06-03

## 2020-11-24 RX ORDER — ZOLPIDEM TARTRATE 10 MG/1
10 TABLET ORAL NIGHTLY PRN
Qty: 30 TABLET | Refills: 0 | Status: SHIPPED | OUTPATIENT
Start: 2020-11-24 | End: 2020-12-16 | Stop reason: SDUPTHER

## 2020-11-24 NOTE — PROGRESS NOTES
The ABCs of the Annual Wellness Visit  Initial Medicare Wellness Visit    Chief Complaint   Patient presents with   • Medicare Wellness-subsequent       Subjective   History of Present Illness:  Karina Saleem is a 80 y.o. female who presents for an Initial Medicare Wellness Visit.    HEALTH RISK ASSESSMENT    Recent Hospitalizations:  Recently treated at the following:  Other: [facility]    Current Medical Providers:  Patient Care Team:  Aditi Agarwal DO as PCP - General    Smoking Status:  Social History     Tobacco Use   Smoking Status Never Smoker   Smokeless Tobacco Never Used       Alcohol Consumption:  Social History     Substance and Sexual Activity   Alcohol Use No   • Frequency: Never       Depression Screen:   PHQ-2/PHQ-9 Depression Screening 11/24/2020   Little interest or pleasure in doing things 0   Feeling down, depressed, or hopeless 1   Trouble falling or staying asleep, or sleeping too much 1   Feeling tired or having little energy 1   Poor appetite or overeating 0   Feeling bad about yourself - or that you are a failure or have let yourself or your family down 1   Trouble concentrating on things, such as reading the newspaper or watching television 0   Moving or speaking so slowly that other people could have noticed. Or the opposite - being so fidgety or restless that you have been moving around a lot more than usual 1   Thoughts that you would be better off dead, or of hurting yourself in some way 0   Total Score 5   If you checked off any problems, how difficult have these problems made it for you to do your work, take care of things at home, or get along with other people? Not difficult at all       Fall Risk Screen:  STEADI Fall Risk Assessment was completed, and patient is at LOW risk for falls.Assessment completed on:11/24/2020    Health Habits and Functional and Cognitive Screening:  Functional & Cognitive Status 11/24/2020   Do you have difficulty preparing food and eating? No   Do you  have difficulty bathing yourself, getting dressed or grooming yourself? No   Do you have difficulty moving around from place to place? No   Do you have trouble with steps or getting out of a bed or a chair? No   Current Diet Well Balanced Diet   Dental Exam Not up to date   Eye Exam Not up to date   Exercise (times per week) 0 times per week   Current Exercises Include No Regular Exercise   Do you need help using the phone?  No   Are you deaf or do you have serious difficulty hearing?  No   Do you need help with transportation? Yes   Do you need help shopping? No   Do you need help preparing meals?  No   Do you need help with housework?  No   Do you need help with laundry? Yes   Do you need help taking your medications? No   Do you need help managing money? No   Do you ever drive or ride in a car without wearing a seat belt? No   Have you felt unusual stress, anger or loneliness in the last month? No   Who do you live with? Alone   If you need help, do you have trouble finding someone available to you? No   Have you been bothered in the last four weeks by sexual problems? No   Do you have difficulty concentrating, remembering or making decisions? No         Does the patient have evidence of cognitive impairment? No    Asprin use counseling:Does not need ASA (and currently is not on it)    Age-appropriate Screening Schedule:  Refer to the list below for future screening recommendations based on patient's age, sex and/or medical conditions. Orders for these recommended tests are listed in the plan section. The patient has been provided with a written plan.    Health Maintenance   Topic Date Due   • URINE MICROALBUMIN  1940   • TDAP/TD VACCINES (1 - Tdap) 08/12/1957   • ZOSTER VACCINE (2 of 3) 02/09/2016   • HEMOGLOBIN A1C  06/13/2019   • DXA SCAN  06/17/2019   • INFLUENZA VACCINE  08/01/2020   • DIABETIC EYE EXAM  09/27/2020          The following portions of the patient's history were reviewed and updated as  appropriate: allergies, current medications, past family history, past medical history, past social history, past surgical history and problem list.    Outpatient Medications Prior to Visit   Medication Sig Dispense Refill   • hydrocortisone-bacitracin-zinc oxide-nystatin (MAGIC BARRIER) Apply 1 application topically to the appropriate area as directed 2 (Two) Times a Day As Needed (rash). 120 g 3   • levothyroxine (SYNTHROID, LEVOTHROID) 137 MCG tablet Take 1 tablet by mouth Daily. 90 tablet 0   • loperamide (IMODIUM) 2 MG capsule Take 1 capsule by mouth 4 (Four) Times a Day As Needed for Diarrhea. 40 capsule 1   • Naloxone HCl (EVZIO) 2 MG/0.4ML solution auto-injector 2 mg.     • nitroglycerin (Nitrostat) 0.4 MG SL tablet Place 1 tablet under the tongue Every 5 (Five) Minutes As Needed for Chest Pain (to max of 3 tabs per episode). 50 tablet 1   • promethazine (PHENERGAN) 25 MG tablet TAKE ONE TABLET BY MOUTH EVERY 8 HOURS AS NEEDED FOR NAUSEA & VOMITING 46 tablet 0   • Cholecalciferol (VITAMIN D) 50 MCG (2000 UT) tablet Take 2,000 Units by mouth Daily. 90 tablet 0   • diazePAM (VALIUM) 10 MG tablet Take 1 tablet by mouth At Night As Needed for Anxiety. 30 tablet 0   • fentaNYL (DURAGESIC) 100 MCG/HR patch Place 1 patch on the skin as directed by provider Every 72 (Seventy-Two) Hours. 10 patch 0   • gabapentin (NEURONTIN) 100 MG capsule TAKE TWO CAPSULES BY MOUTH TWICE DAILY & ONE CAPSULE AT BEDTIME 150 capsule 2   • HYDROcodone-acetaminophen (Norco) 7.5-325 MG per tablet Take 1 tablet by mouth Daily As Needed for Moderate Pain . 30 tablet 0   • raNITIdine (ZANTAC) 150 MG tablet Take 150 mg by mouth 2 (Two) Times a Day As Needed.     • zolpidem (AMBIEN) 10 MG tablet Take 1 tablet by mouth At Night As Needed for Sleep. 30 tablet 0     No facility-administered medications prior to visit.        Patient Active Problem List   Diagnosis   • Abnormal urinalysis   • Arthralgia of right foot   • Broken skin   • Skin ulcer  "(CMS/[facility])   • Disuse osteoporosis   • GERD (gastroesophageal reflux disease)   • Hypothyroidism   • Insomnia   • Kyphosis   • Kyphoscoliosis   • Malaise and fatigue   • Neuropathy, peripheral   • Other dorsalgia   • Pain of lower extremity   • Poliomyelitis   • Post poliomyelitis syndrome   • Pressure ulcer, lower back   • Right calf pain   • Visit for screening mammogram   • Vitamin D deficiency       Advanced Care Planning:  ACP discussion was held with the patient during this visit. Patient has an advance directive (not in EMR), copy requested.    Review of Systems    Compared to one year ago, the patient feels her physical health is the same.  Compared to one year ago, the patient feels her mental health is the same.    Reviewed chart for potential of high risk medication in the elderly: yes  Reviewed chart for potential of harmful drug interactions in the elderly:yes    Objective         Vitals:    11/24/20 1408   BP: 148/78   BP Location: Right arm   Patient Position: Sitting   Cuff Size: Adult   Pulse: 96   Resp: 16   Temp: 96.9 °F (36.1 °C)   TempSrc: Temporal   SpO2: 96%   Weight: Comment: wheelchair-cannot get out of chair   Height: 157.5 cm (62\")   PainSc:   8   PainLoc: Back       There is no height or weight on file to calculate BMI.  Discussed the patient's BMI with her. The BMI is in the acceptable range.    Physical Exam  Vitals signs and nursing note reviewed.   Constitutional:       General: She is not in acute distress.     Appearance: Normal appearance. She is not ill-appearing or toxic-appearing.   Pulmonary:       Skin:     General: Skin is warm and dry.      Findings: Lesion present.   Neurological:      Mental Status: She is alert and oriented to person, place, and time.      Cranial Nerves: No cranial nerve deficit.   Psychiatric:         Mood and Affect: Mood normal.         Behavior: Behavior normal.         Thought Content: Thought content normal.         Judgment: Judgment normal. " "        Maximum   Score Patient's   Score Questions   5 4 \"What is the year?Season?Date?Day of the week?Month?\"   5 5 \"Where are we now: State?County?Town/city?Hospital?Floor?\"   3 3 3 Unrelated objects Number of trials:___   5 3 Count backward from 100 by sevens or spell WORLD backwards   3 3 Name 3 things from above   2 2 Identify 2 objects   1 1 Repeat the phrase: No ifs, ands,or buts.   3 3 Take paper in right hand, fold it in half, and put it on the floor.   1 1 Please read this and do what it says. \"Close your eyes\"   1 1 Make up and write a sentence about anything. Noun and verb   1 1 Copy this picture 10 angles must be present.   30 27 Total MMSE              Assessment/Plan   Medicare Risks and Personalized Health Plan  CMS Preventative Services Quick Reference  Breast Cancer/Mammogram Screening  Chronic Pain   Dementia/Memory   Depression/Dysphoria  Glaucoma Risk  Immunizations Discussed/Encouraged (specific immunizations; Influenza, Pneumococcal 23, Prevnar and Shingrix )  Inadequate Social Support, Isolation, Loneliness, Lack of Transportation, Financial Difficulties, or Caregiver Stress   Osteoprorosis Risk  Polypharmacy  Urinary Incontinence    The above risks/problems have been discussed with the patient.  Pertinent information has been shared with the patient in the After Visit Summary.  Follow up plans and orders are seen below in the Assessment/Plan Section.    Diagnoses and all orders for this visit:    1. Medicare annual wellness visit, initial (Primary)    2. Anxiety  -     diazePAM (VALIUM) 10 MG tablet; Take 1 tablet by mouth At Night As Needed for Anxiety.  Dispense: 30 tablet; Refill: 0  -     Comprehensive Metabolic Panel  -     CBC & Differential  -     CBC Auto Differential    3. Post poliomyelitis syndrome  -     fentaNYL (DURAGESIC) 100 MCG/HR patch; Place 1 patch on the skin as directed by provider Every 72 (Seventy-Two) Hours.  Dispense: 10 patch; Refill: 0  -     " HYDROcodone-acetaminophen (Norco) 7.5-325 MG per tablet; Take 1 tablet by mouth Daily As Needed for Moderate Pain .  Dispense: 30 tablet; Refill: 0    4. Primary insomnia  -     zolpidem (AMBIEN) 10 MG tablet; Take 1 tablet by mouth At Night As Needed for Sleep.  Dispense: 30 tablet; Refill: 0    5. Disuse osteoporosis  -     Comprehensive Metabolic Panel  -     CBC & Differential  -     CBC Auto Differential    6. Muscle weakness (generalized)   -     CBC & Differential  -     CBC Auto Differential    7. Acquired hypothyroidism  -     T4, Free  -     TSH    8. Vitamin D deficiency  -     Vitamin D 25 Hydroxy    9. Need for influenza vaccination  -     Fluad Quad >65 years    Other orders  -     famotidine (PEPCID) 40 MG tablet; Take 1 tablet by mouth Daily As Needed for Indigestion or Heartburn.  Dispense: 30 tablet; Refill: 0  -     Discontinue: gabapentin (NEURONTIN) 100 MG capsule; TAKE TWO CAPSULES BY MOUTH TWICE DAILY & ONE CAPSULE AT BEDTIME  Dispense: 450 capsule; Refill: 2  -     Cholecalciferol (Vitamin D) 50 MCG (2000 UT) tablet; Take 2,000 Units by mouth Daily.  Dispense: 90 tablet; Refill: 1  -     gabapentin (NEURONTIN) 100 MG capsule; TAKE TWO CAPSULES BY MOUTH TWICE DAILY & ONE CAPSULE AT BEDTIME  Dispense: 450 capsule; Refill: 2      Follow Up:  No follow-ups on file.   Flu shot  An After Visit Summary and PPPS were given to the patient.     Meds refilled  Trial of a pressure reducing mattress and another pad for her wheelchair

## 2020-11-25 ENCOUNTER — TELEPHONE (OUTPATIENT)
Dept: FAMILY MEDICINE CLINIC | Facility: CLINIC | Age: 80
End: 2020-11-25

## 2020-11-25 DIAGNOSIS — E03.9 ACQUIRED HYPOTHYROIDISM: ICD-10-CM

## 2020-11-25 DIAGNOSIS — E55.9 VITAMIN D DEFICIENCY: Primary | ICD-10-CM

## 2020-11-25 LAB
25(OH)D3 SERPL-MCNC: 23.3 NG/ML (ref 30–100)
ALBUMIN SERPL-MCNC: 4.4 G/DL (ref 3.5–5.2)
ALBUMIN/GLOB SERPL: 1.5 G/DL
ALP SERPL-CCNC: 63 U/L (ref 39–117)
ALT SERPL W P-5'-P-CCNC: 8 U/L (ref 1–33)
ANION GAP SERPL CALCULATED.3IONS-SCNC: 9.5 MMOL/L (ref 5–15)
AST SERPL-CCNC: 18 U/L (ref 1–32)
BASOPHILS # BLD AUTO: 0.04 10*3/MM3 (ref 0–0.2)
BASOPHILS NFR BLD AUTO: 0.7 % (ref 0–1.5)
BILIRUB SERPL-MCNC: 0.4 MG/DL (ref 0–1.2)
BUN SERPL-MCNC: 12 MG/DL (ref 8–23)
BUN/CREAT SERPL: 25 (ref 7–25)
CALCIUM SPEC-SCNC: 9.2 MG/DL (ref 8.6–10.5)
CHLORIDE SERPL-SCNC: 103 MMOL/L (ref 98–107)
CO2 SERPL-SCNC: 27.5 MMOL/L (ref 22–29)
CREAT SERPL-MCNC: 0.48 MG/DL (ref 0.57–1)
DEPRECATED RDW RBC AUTO: 41.4 FL (ref 37–54)
EOSINOPHIL # BLD AUTO: 0.08 10*3/MM3 (ref 0–0.4)
EOSINOPHIL NFR BLD AUTO: 1.3 % (ref 0.3–6.2)
ERYTHROCYTE [DISTWIDTH] IN BLOOD BY AUTOMATED COUNT: 13.9 % (ref 12.3–15.4)
GFR SERPL CREATININE-BSD FRML MDRD: 124 ML/MIN/1.73
GLOBULIN UR ELPH-MCNC: 3 GM/DL
GLUCOSE SERPL-MCNC: 84 MG/DL (ref 65–99)
HCT VFR BLD AUTO: 42.3 % (ref 34–46.6)
HGB BLD-MCNC: 13.5 G/DL (ref 12–15.9)
IMM GRANULOCYTES # BLD AUTO: 0.01 10*3/MM3 (ref 0–0.05)
IMM GRANULOCYTES NFR BLD AUTO: 0.2 % (ref 0–0.5)
LYMPHOCYTES # BLD AUTO: 1.59 10*3/MM3 (ref 0.7–3.1)
LYMPHOCYTES NFR BLD AUTO: 26.4 % (ref 19.6–45.3)
MCH RBC QN AUTO: 26.8 PG (ref 26.6–33)
MCHC RBC AUTO-ENTMCNC: 31.9 G/DL (ref 31.5–35.7)
MCV RBC AUTO: 83.9 FL (ref 79–97)
MONOCYTES # BLD AUTO: 0.41 10*3/MM3 (ref 0.1–0.9)
MONOCYTES NFR BLD AUTO: 6.8 % (ref 5–12)
NEUTROPHILS NFR BLD AUTO: 3.9 10*3/MM3 (ref 1.7–7)
NEUTROPHILS NFR BLD AUTO: 64.6 % (ref 42.7–76)
NRBC BLD AUTO-RTO: 0 /100 WBC (ref 0–0.2)
PLATELET # BLD AUTO: 179 10*3/MM3 (ref 140–450)
PMV BLD AUTO: 12.2 FL (ref 6–12)
POTASSIUM SERPL-SCNC: 4.4 MMOL/L (ref 3.5–5.2)
PROT SERPL-MCNC: 7.4 G/DL (ref 6–8.5)
RBC # BLD AUTO: 5.04 10*6/MM3 (ref 3.77–5.28)
SODIUM SERPL-SCNC: 140 MMOL/L (ref 136–145)
T4 FREE SERPL-MCNC: 0.85 NG/DL (ref 0.93–1.7)
TSH SERPL DL<=0.05 MIU/L-ACNC: 7.7 UIU/ML (ref 0.27–4.2)
WBC # BLD AUTO: 6.03 10*3/MM3 (ref 3.4–10.8)

## 2020-11-25 RX ORDER — ERGOCALCIFEROL 1.25 MG/1
50000 CAPSULE ORAL WEEKLY
Qty: 12 CAPSULE | Refills: 0 | Status: SHIPPED | OUTPATIENT
Start: 2020-11-25 | End: 2021-02-26

## 2020-11-25 RX ORDER — LEVOTHYROXINE SODIUM 0.15 MG/1
150 TABLET ORAL DAILY
Qty: 90 TABLET | Refills: 0 | Status: SHIPPED | OUTPATIENT
Start: 2020-11-25 | End: 2021-03-19

## 2020-11-25 NOTE — TELEPHONE ENCOUNTER
----- Message from Aditi Agarwal DO sent at 11/25/2020  8:59 AM EST -----  Thyroid and vit D low----sent out new doses for them and can rech in 3mos  CBC/ CMP good

## 2020-12-15 ENCOUNTER — TELEPHONE (OUTPATIENT)
Dept: FAMILY MEDICINE CLINIC | Facility: CLINIC | Age: 80
End: 2020-12-15

## 2020-12-15 RX ORDER — LORATADINE 10 MG/1
10 TABLET ORAL NIGHTLY PRN
Qty: 30 TABLET | Refills: 2 | Status: SHIPPED | OUTPATIENT
Start: 2020-12-15 | End: 2021-02-26

## 2020-12-15 NOTE — TELEPHONE ENCOUNTER
Pt states she is having runny nose at night at bedtime.  Wants to know if you can call something in for her.  Last seen 11/2020  Waylon Travis was sent to pharmacy

## 2020-12-16 ENCOUNTER — TELEPHONE (OUTPATIENT)
Dept: FAMILY MEDICINE CLINIC | Facility: CLINIC | Age: 80
End: 2020-12-16

## 2020-12-16 DIAGNOSIS — F41.9 ANXIETY: ICD-10-CM

## 2020-12-16 DIAGNOSIS — F51.01 PRIMARY INSOMNIA: ICD-10-CM

## 2020-12-16 DIAGNOSIS — G14 POST POLIOMYELITIS SYNDROME: ICD-10-CM

## 2020-12-16 RX ORDER — FENTANYL 100 UG/H
1 PATCH TRANSDERMAL
Qty: 10 PATCH | Refills: 0 | Status: SHIPPED | OUTPATIENT
Start: 2020-12-16 | End: 2021-01-20 | Stop reason: SDUPTHER

## 2020-12-16 RX ORDER — ZOLPIDEM TARTRATE 10 MG/1
10 TABLET ORAL NIGHTLY PRN
Qty: 30 TABLET | Refills: 0 | Status: SHIPPED | OUTPATIENT
Start: 2020-12-16 | End: 2021-01-25 | Stop reason: SDUPTHER

## 2020-12-16 RX ORDER — HYDROCODONE BITARTRATE AND ACETAMINOPHEN 7.5; 325 MG/1; MG/1
1 TABLET ORAL DAILY PRN
Qty: 30 TABLET | Refills: 0 | Status: SHIPPED | OUTPATIENT
Start: 2020-12-16 | End: 2021-01-20 | Stop reason: SDUPTHER

## 2020-12-16 RX ORDER — DIAZEPAM 10 MG/1
10 TABLET ORAL NIGHTLY PRN
Qty: 30 TABLET | Refills: 0 | Status: SHIPPED | OUTPATIENT
Start: 2020-12-16 | End: 2021-01-25 | Stop reason: SDUPTHER

## 2020-12-16 NOTE — TELEPHONE ENCOUNTER
Patient called and stated her Hydrocodone, Valium, Ambien, and Fentanyl patches will all be due to be refilled on 12/24.  She said she called the pharmacy and they will be closed from 12/24-12/28.  Patient asking if these can be sent in early to prevent her from running out of her medication over the holiday.

## 2020-12-19 ENCOUNTER — TELEPHONE (OUTPATIENT)
Dept: FAMILY MEDICINE CLINIC | Facility: CLINIC | Age: 80
End: 2020-12-19

## 2020-12-19 DIAGNOSIS — R09.81 NASAL SINUS CONGESTION: Primary | ICD-10-CM

## 2020-12-19 RX ORDER — ECHINACEA PURPUREA EXTRACT 125 MG
1 TABLET ORAL AS NEEDED
Qty: 30 ML | Refills: 0 | Status: SHIPPED | OUTPATIENT
Start: 2020-12-19 | End: 2021-02-26

## 2020-12-19 RX ORDER — MOMETASONE FUROATE 50 UG/1
2 SPRAY, METERED NASAL DAILY
Qty: 17 G | Refills: 0 | Status: SHIPPED | OUTPATIENT
Start: 2020-12-19 | End: 2021-02-26

## 2020-12-19 RX ORDER — LORATADINE 10 MG/1
10 TABLET ORAL DAILY
Qty: 30 TABLET | Refills: 0 | Status: SHIPPED | OUTPATIENT
Start: 2020-12-19 | End: 2021-02-17

## 2020-12-19 RX ORDER — GUAIFENESIN 600 MG/1
600 TABLET, EXTENDED RELEASE ORAL 2 TIMES DAILY
Qty: 20 TABLET | Refills: 0 | Status: SHIPPED | OUTPATIENT
Start: 2020-12-19 | End: 2021-10-19

## 2020-12-19 NOTE — TELEPHONE ENCOUNTER
Received on-call call from patient. States she has been having sinus congestion, runny nose, sneezing, sinus headaches in her for head and nose since Monday or before. She said she had contacted the office on Monday but did not receive a return call regarding her symptoms and is requesting something for relief. She denies any fever or shortness of breath or exposure to Covid. She does have a history of sinus infections in the past and has not always required antibiotics. I advised her in symptomatic treatment including nasal steroid, mucolytic, saline spray, and anti-histamines. I did also mention, but not recommend, nasal decongestants. I have sent in nasonex and saline spray, claritin and Mucinex. And advised if symptoms were not improving by Monday to contact the office.

## 2020-12-28 RX ORDER — PROMETHAZINE HYDROCHLORIDE 25 MG/1
TABLET ORAL
Qty: 46 TABLET | Refills: 0 | Status: SHIPPED | OUTPATIENT
Start: 2020-12-28 | End: 2021-02-04 | Stop reason: SDUPTHER

## 2021-01-20 DIAGNOSIS — G14 POST POLIOMYELITIS SYNDROME: ICD-10-CM

## 2021-01-20 RX ORDER — FENTANYL 100 UG/H
1 PATCH TRANSDERMAL
Qty: 10 PATCH | Refills: 0 | Status: SHIPPED | OUTPATIENT
Start: 2021-01-20 | End: 2021-02-17 | Stop reason: SDUPTHER

## 2021-01-20 RX ORDER — HYDROCODONE BITARTRATE AND ACETAMINOPHEN 7.5; 325 MG/1; MG/1
1 TABLET ORAL DAILY PRN
Qty: 30 TABLET | Refills: 0 | Status: SHIPPED | OUTPATIENT
Start: 2021-01-20 | End: 2021-02-17 | Stop reason: SDUPTHER

## 2021-01-20 NOTE — TELEPHONE ENCOUNTER
Caller: Karina Saleem    Relationship: Self    Best call back number: [phone]    Medication needed:   Requested Prescriptions     Pending Prescriptions Disp Refills   • fentaNYL (DURAGESIC) 100 MCG/HR patch 10 patch 0     Sig: Place 1 patch on the skin as directed by provider Every 72 (Seventy-Two) Hours.   • HYDROcodone-acetaminophen (Norco) 7.5-325 MG per tablet 30 tablet 0     Sig: Take 1 tablet by mouth Daily As Needed for Moderate Pain .       When do you need the refill by: 1/21/21    What details did the patient provide when requesting the medication: PATIENT STATED SHE HAS ONE MORE DAY LEFT.    Does the patient have less than a 3 day supply:  [x] Yes  [] No    What is the patient's preferred pharmacy: [facility] [address] [place] - [phone] [facility] [phone] [facility]     PLEASE ADVISE AND CALL PATIENT [phone]

## 2021-01-25 DIAGNOSIS — F41.9 ANXIETY: ICD-10-CM

## 2021-01-25 DIAGNOSIS — F51.01 PRIMARY INSOMNIA: ICD-10-CM

## 2021-01-25 RX ORDER — DIAZEPAM 10 MG/1
10 TABLET ORAL NIGHTLY PRN
Qty: 30 TABLET | Refills: 0 | Status: SHIPPED | OUTPATIENT
Start: 2021-01-25 | End: 2021-02-26

## 2021-01-25 RX ORDER — ZOLPIDEM TARTRATE 10 MG/1
10 TABLET ORAL NIGHTLY PRN
Qty: 30 TABLET | Refills: 0 | Status: SHIPPED | OUTPATIENT
Start: 2021-01-25 | End: 2021-02-26

## 2021-02-04 RX ORDER — PROMETHAZINE HYDROCHLORIDE 25 MG/1
25 TABLET ORAL EVERY 8 HOURS PRN
Qty: 46 TABLET | Refills: 0 | Status: SHIPPED | OUTPATIENT
Start: 2021-02-04 | End: 2021-03-09

## 2021-02-05 ENCOUNTER — NURSE TRIAGE (OUTPATIENT)
Dept: CALL CENTER | Facility: HOSPITAL | Age: 81
End: 2021-02-05

## 2021-02-05 NOTE — TELEPHONE ENCOUNTER
"This was a hub call-she has had CP for 2 weeks and on.She states it is a ache. It hurts where the ribs come together.   Advised to be seen, she is wheelchair bound, explained to caller she has been in pain for 2 weeks. Need to be evaluated to see what is the problem. She states I just want a MD appt. Encouraged the caller to call 911. The caller hung up the phone.   Reason for Disposition  • Patient sounds very sick or weak to the triager    Additional Information  • Negative: Severe difficulty breathing (e.g., struggling for each breath, speaks in single words)  • Negative: Difficult to awaken or acting confused (e.g., disoriented, slurred speech)  • Negative: Shock suspected (e.g., cold/pale/clammy skin, too weak to stand, low BP, rapid pulse)  • Negative: [1] Chest pain lasts > 5 minutes AND [2] age > 45  • Negative: [1] Chest pain lasts > 5 minutes AND [2] age > 30 AND [3] one or more cardiac risk factors (e.g., diabetes, high blood pressure, high cholesterol, smoker, or strong family history of heart disease)  • Negative: [1] Chest pain lasts > 5 minutes AND [2] history of heart disease (i.e., angina, heart attack, heart failure, bypass surgery, takes nitroglycerin)  • Negative: [1] Chest pain lasts > 5 minutes AND [2] described as crushing, pressure-like, or heavy  • Negative: Passed out (i.e., lost consciousness, collapsed and was not responding)  • Negative: Heart beating < 50 beats per minute OR > 140 beats per minute  • Negative: Visible sweat on face or sweat dripping down face  • Negative: Sounds like a life-threatening emergency to the triager  • Negative: Followed a chest injury  • Negative: SEVERE chest pain  • Negative: [1] Chest pain (or \"angina\") comes and goes AND [2] is happening more often (increasing in frequency) or getting worse (increasing in severity)  • Negative: Pain also in shoulder(s) or arm(s) or jaw  • Negative: Difficulty breathing  • Negative: Dizziness or lightheadedness  • " "Negative: Coughing up blood  • Negative: Cocaine use within last 3 days  • Negative: Major surgery in the past month  • Negative: Hip or leg fracture (broken bone) in past month (or had cast on leg or ankle in past month)  • Negative: Illness requiring prolonged bedrest in past month (e.g., immobilization, long hospital stay)  • Negative: Long-distance travel in past month (e.g., car, bus, train, plane; with trip lasting 6 or more hours)  • Negative: History of prior \"blood clot\" in leg or lungs (i.e., deep vein thrombosis, pulmonary embolism)  • Negative: History of inherited increased risk of blood clots (e.g., Factor 5 Leiden, Anti-thrombin 3, Protein C or Protein S deficiency, Prothrombin mutation)  • Negative: [1] Chest pain lasts > 5 minutes AND [2] occurred in past 3 days (72 hours)  • Negative: Taking a deep breath makes pain worse    Answer Assessment - Initial Assessment Questions  1. LOCATION: \"Where does it hurt?\"        She is having CP for 2 weeks she states it is a ache.   2. RADIATION: \"Does the pain go anywhere else?\" (e.g., into neck, jaw, arms, back)      n  3. ONSET: \"When did the chest pain begin?\" (Minutes, hours or days)       Cp for 2 weeks  4. PATTERN \"Does the pain come and go, or has it been constant since it started?\"  \"Does it get worse with exertion?\"       It stays the same   5. DURATION: \"How long does it last\" (e.g., seconds, minutes, hours)      It does not go away.   6. SEVERITY: \"How bad is the pain?\"  (e.g., Scale 1-10; mild, moderate, or severe)     - MILD (1-3): doesn't interfere with normal activities      - MODERATE (4-7): interferes with normal activities or awakens from sleep     - SEVERE (8-10): excruciating pain, unable to do any normal activities        5  7. CARDIAC RISK FACTORS: \"Do you have any history of heart problems or risk factors for heart disease?\" (e.g., angina, prior heart attack; diabetes, high blood pressure, high cholesterol, smoker, or strong family " "history of heart disease)      She is unsure   8. PULMONARY RISK FACTORS: \"Do you have any history of lung disease?\"  (e.g., blood clots in lung, asthma, emphysema, birth control pills)      Unsure   9. CAUSE: \"What do you think is causing the chest pain?\"      unknown  10. OTHER SYMPTOMS: \"Do you have any other symptoms?\" (e.g., dizziness, nausea, vomiting, sweating, fever, difficulty breathing, cough)        n  11. PREGNANCY: \"Is there any chance you are pregnant?\" \"When was your last menstrual period?\"        n    Protocols used: CHEST PAIN-ADULT-AH      "

## 2021-02-17 DIAGNOSIS — G14 POST POLIOMYELITIS SYNDROME: ICD-10-CM

## 2021-02-17 RX ORDER — HYDROCODONE BITARTRATE AND ACETAMINOPHEN 7.5; 325 MG/1; MG/1
1 TABLET ORAL DAILY PRN
Qty: 30 TABLET | Refills: 0 | Status: SHIPPED | OUTPATIENT
Start: 2021-02-17 | End: 2021-03-18 | Stop reason: SDUPTHER

## 2021-02-17 RX ORDER — FENTANYL 100 UG/H
1 PATCH TRANSDERMAL
Qty: 10 PATCH | Refills: 0 | Status: SHIPPED | OUTPATIENT
Start: 2021-02-17 | End: 2021-03-18 | Stop reason: SDUPTHER

## 2021-02-17 RX ORDER — HYDROCODONE BITARTRATE AND ACETAMINOPHEN 7.5; 325 MG/1; MG/1
1 TABLET ORAL DAILY PRN
Qty: 30 TABLET | Refills: 0 | OUTPATIENT
Start: 2021-02-17

## 2021-02-17 NOTE — TELEPHONE ENCOUNTER
Caller: Karina Saleem    Relationship: Self    Best call back number: [phone] (H)    Medication needed:   Requested Prescriptions     Pending Prescriptions Disp Refills   • HYDROcodone-acetaminophen (Norco) 7.5-325 MG per tablet 30 tablet 0     Sig: Take 1 tablet by mouth Daily As Needed for Moderate Pain .       When do you need the refill by: ASAP    What details did the patient provide when requesting the medication: PATIENT HAS 2 PILLS LEFT     Does the patient have less than a 3 day supply:  [x] Yes  [] No    What is the patient's preferred pharmacy: [address] - [phone] [facility] [phone] FX

## 2021-02-25 DIAGNOSIS — F41.9 ANXIETY: ICD-10-CM

## 2021-02-25 DIAGNOSIS — F51.01 PRIMARY INSOMNIA: ICD-10-CM

## 2021-02-26 ENCOUNTER — OFFICE VISIT (OUTPATIENT)
Dept: FAMILY MEDICINE CLINIC | Facility: CLINIC | Age: 81
End: 2021-02-26

## 2021-02-26 DIAGNOSIS — E55.9 VITAMIN D DEFICIENCY: ICD-10-CM

## 2021-02-26 DIAGNOSIS — R53.2 FUNCTIONAL QUADRIPLEGIA (HCC): ICD-10-CM

## 2021-02-26 DIAGNOSIS — R07.89 ATYPICAL CHEST PAIN: Primary | ICD-10-CM

## 2021-02-26 DIAGNOSIS — K21.9 GASTROESOPHAGEAL REFLUX DISEASE WITHOUT ESOPHAGITIS: ICD-10-CM

## 2021-02-26 DIAGNOSIS — E03.9 ACQUIRED HYPOTHYROIDISM: ICD-10-CM

## 2021-02-26 PROCEDURE — 99214 OFFICE O/P EST MOD 30 MIN: CPT | Performed by: FAMILY MEDICINE

## 2021-02-26 RX ORDER — ZOLPIDEM TARTRATE 10 MG/1
TABLET ORAL
Qty: 30 TABLET | Refills: 0 | Status: SHIPPED | OUTPATIENT
Start: 2021-02-26 | End: 2021-03-25 | Stop reason: SDUPTHER

## 2021-02-26 RX ORDER — OMEPRAZOLE 40 MG/1
40 CAPSULE, DELAYED RELEASE ORAL DAILY
Qty: 30 CAPSULE | Refills: 0 | Status: SHIPPED | OUTPATIENT
Start: 2021-02-26 | End: 2021-06-03 | Stop reason: SDUPTHER

## 2021-02-26 RX ORDER — DIAZEPAM 10 MG/1
TABLET ORAL
Qty: 30 TABLET | Refills: 0 | Status: SHIPPED | OUTPATIENT
Start: 2021-02-26 | End: 2021-03-26 | Stop reason: SDUPTHER

## 2021-02-26 NOTE — PROGRESS NOTES
Subjective   Karina Saleem is a 80 y.o. female.     Chief Complaint   Patient presents with   • Chest Pain     Patient states that she has had chest pain off and on for years. Patient states that it hurts in the middle of her chest. Patient stated that she is no longer taking pepcid when I went through her med list with her today.    • Anxiety     diazepam to [facility] in [place]  to deliver to her today.    • Pain         Current Outpatient Medications:   •  Cholecalciferol (Vitamin D) 50 MCG (2000 UT) tablet, Take 2,000 Units by mouth Daily., Disp: 90 tablet, Rfl: 1  •  fentaNYL (DURAGESIC) 100 MCG/HR patch, Place 1 patch on the skin as directed by provider Every 72 (Seventy-Two) Hours., Disp: 10 patch, Rfl: 0  •  gabapentin (NEURONTIN) 100 MG capsule, TAKE TWO CAPSULES BY MOUTH TWICE DAILY & ONE CAPSULE AT BEDTIME, Disp: 450 capsule, Rfl: 2  •  guaiFENesin (Mucinex) 600 MG 12 hr tablet, Take 1 tablet by mouth 2 (Two) Times a Day. To thin secretions, Disp: 20 tablet, Rfl: 0  •  HYDROcodone-acetaminophen (Norco) 7.5-325 MG per tablet, Take 1 tablet by mouth Daily As Needed for Moderate Pain ., Disp: 30 tablet, Rfl: 0  •  hydrocortisone-bacitracin-zinc oxide-nystatin (MAGIC BARRIER), Apply 1 application topically to the appropriate area as directed 2 (Two) Times a Day As Needed (rash)., Disp: 120 g, Rfl: 3  •  levothyroxine (SYNTHROID, LEVOTHROID) 150 MCG tablet, Take 1 tablet by mouth Daily., Disp: 90 tablet, Rfl: 0  •  nitroglycerin (Nitrostat) 0.4 MG SL tablet, Place 1 tablet under the tongue Every 5 (Five) Minutes As Needed for Chest Pain (to max of 3 tabs per episode)., Disp: 50 tablet, Rfl: 1  •  promethazine (PHENERGAN) 25 MG tablet, Take 1 tablet by mouth Every 8 (Eight) Hours As Needed for Nausea or Vomiting., Disp: 46 tablet, Rfl: 0  •  diazePAM (VALIUM) 10 MG tablet, TAKE ONE TABLET BY MOUTH AT BEDTIME AS NEEDED FOR ANXIETY, Disp: 30 tablet, Rfl: 0  •  omeprazole (priLOSEC) 40 MG capsule, Take 1 capsule by  mouth Daily., Disp: 30 capsule, Rfl: 0  •  zolpidem (AMBIEN) 10 MG tablet, TAKE ONE TABLET BY MOUTH AT BEDTIME AS NEEDED FOR SLEEP., Disp: 30 tablet, Rfl: 0    Past Medical History:   Diagnosis Date   • Anxiety    • Cataracts, bilateral    • Femur fracture, right    • Healing pressure ulcer, unspecified pressure ulcer stage    • Hypothyroidism    • Insomnia    • Kyphosis    • Neuropathy     in feet   • Osteoporosis    • Polio osteopathy of lower leg, left    • Polio osteopathy of lower leg, right    • Scoliosis        Past Surgical History:   Procedure Laterality Date   • APPENDECTOMY     • LIPOMA EXCISION Bilateral     breast   • TOTAL ABDOMINAL HYSTERECTOMY         Family History   Problem Relation Age of Onset   • Diabetes Mother    • Heart disease Mother         CHF   • Heart failure Father    • Heart disease Father    • COPD Father         BLACK LUNG   • Lymphoma Sister    • Lung cancer Brother    • Pancreatic cancer Brother    • Bone cancer Brother    • Cancer Brother        Social History     Socioeconomic History   • Marital status: Single     Spouse name: Not on file   • Number of children: Not on file   • Years of education: Not on file   • Highest education level: Not on file   Tobacco Use   • Smoking status: Never Smoker   • Smokeless tobacco: Never Used   Substance and Sexual Activity   • Alcohol use: No     Frequency: Never   • Drug use: No   • Sexual activity: Defer       79 y/o C female on phone w/ c/o's Chest pain off/on w/ hx/o Hypothyroid/ Post-Polio Synd/ Anxiety    Pt states she has been under a lot stress and her chest was hurting a lot; pt tried 2 NTG w/ some relief; pt states her family has been stressing her out.......and she hasnt left the apt in over a year due to COVID    Pt did see wound  and he said she doesn't need to come back for her back ulcer       The following portions of the patient's history were reviewed and updated as appropriate: allergies, current medications, past  family history, past medical history, past social history, past surgical history and problem list.    Review of Systems   Constitutional: Negative for activity change, appetite change, diaphoresis, unexpected weight gain and unexpected weight loss.   Respiratory: Negative for shortness of breath.    Cardiovascular: Positive for chest pain. Negative for palpitations and leg swelling.   Gastrointestinal: Negative for blood in stool, constipation, nausea and vomiting.   Psychiatric/Behavioral: Positive for stress. The patient is nervous/anxious.        There were no vitals filed for this visit.    Objective   Physical Exam  Constitutional:       General: She is not in acute distress.  Pulmonary:      Effort: Pulmonary effort is normal.   Neurological:      Mental Status: She is alert and oriented to person, place, and time.      Cranial Nerves: No cranial nerve deficit.   Psychiatric:         Attention and Perception: Attention and perception normal.         Mood and Affect: Mood and affect normal.         Speech: Speech normal.         Behavior: Behavior normal. Behavior is cooperative.         Thought Content: Thought content normal.         Cognition and Memory: Cognition and memory normal.         Judgment: Judgment normal.           Assessment/Plan   Diagnoses and all orders for this visit:    1. Atypical chest pain (Primary)    2. Gastroesophageal reflux disease without esophagitis    3. Acquired hypothyroidism  -     T4, Free; Future  -     TSH; Future    4. Vitamin D deficiency  -     Vitamin D 25 Hydroxy; Future    5. Functional quadriplegia (CMS/HCC)    Other orders  -     omeprazole (priLOSEC) 40 MG capsule; Take 1 capsule by mouth Daily.  Dispense: 30 capsule; Refill: 0    f/u labs at [facility] soon  Start PPI tx to see if helps the Chest pain

## 2021-02-28 PROBLEM — A80.9 POLIOMYELITIS: Chronic | Status: ACTIVE | Noted: 2018-01-23

## 2021-02-28 PROBLEM — R53.2 FUNCTIONAL QUADRIPLEGIA (HCC): Status: ACTIVE | Noted: 2021-02-28

## 2021-02-28 PROBLEM — G14 POST POLIOMYELITIS SYNDROME: Chronic | Status: ACTIVE | Noted: 2018-01-23

## 2021-02-28 PROBLEM — K21.9 GERD (GASTROESOPHAGEAL REFLUX DISEASE): Chronic | Status: ACTIVE | Noted: 2018-01-23

## 2021-02-28 PROBLEM — E03.9 HYPOTHYROIDISM: Chronic | Status: ACTIVE | Noted: 2018-01-23

## 2021-02-28 PROBLEM — M41.9 KYPHOSCOLIOSIS: Chronic | Status: ACTIVE | Noted: 2018-01-23

## 2021-02-28 PROBLEM — M81.8 DISUSE OSTEOPOROSIS: Chronic | Status: ACTIVE | Noted: 2018-01-23

## 2021-02-28 PROBLEM — G62.9 NEUROPATHY, PERIPHERAL: Chronic | Status: ACTIVE | Noted: 2018-01-23

## 2021-02-28 PROBLEM — E55.9 VITAMIN D DEFICIENCY: Chronic | Status: ACTIVE | Noted: 2018-06-05

## 2021-03-04 ENCOUNTER — TELEPHONE (OUTPATIENT)
Dept: FAMILY MEDICINE CLINIC | Facility: CLINIC | Age: 81
End: 2021-03-04

## 2021-03-04 RX ORDER — NYSTATIN 100000 U/G
OINTMENT TOPICAL 2 TIMES DAILY PRN
Qty: 30 G | Refills: 2 | Status: SHIPPED | OUTPATIENT
Start: 2021-03-04 | End: 2021-03-18

## 2021-03-04 NOTE — TELEPHONE ENCOUNTER
Caller: Karina Saleem    Relationship: Self    Best call back number: [phone]    What medication are you requesting:     NEW CREAM TO REPLACE THE MAGIC BARRIER. INSURANCE IS NO LONGER COVERING IT.    Have you had these symptoms before:    [x] Yes  [] No    Have you been treated for these symptoms before:   [x] Yes  [] No    If a prescription is needed, what is your preferred pharmacy and phone number:      [facility] [place], [place] - [address] [place], [place] [address] - [phone] [facility] [phone]   [phone]    PLEASE CALL WHEN DONE

## 2021-03-08 NOTE — TELEPHONE ENCOUNTER
Last visit:  2/26/21  Next visit: 6/1/21  Last labs: 11/24/20    Rx requested: Promethazine,Levothyroxine   [facility]: [facility]

## 2021-03-09 DIAGNOSIS — E55.9 VITAMIN D DEFICIENCY: ICD-10-CM

## 2021-03-09 DIAGNOSIS — E03.9 ACQUIRED HYPOTHYROIDISM: ICD-10-CM

## 2021-03-09 RX ORDER — LEVOTHYROXINE SODIUM 137 UG/1
TABLET ORAL
Qty: 90 TABLET | Refills: 0 | Status: SHIPPED | OUTPATIENT
Start: 2021-03-09 | End: 2021-03-18

## 2021-03-09 RX ORDER — PROMETHAZINE HYDROCHLORIDE 25 MG/1
TABLET ORAL
Qty: 46 TABLET | Refills: 0 | Status: SHIPPED | OUTPATIENT
Start: 2021-03-09 | End: 2021-04-16 | Stop reason: SDUPTHER

## 2021-03-09 NOTE — TELEPHONE ENCOUNTER
Pt states it's for the sore areas on her hips/bottom. The Nystatin has made the skin is now more dry and itches terribly worse, and noted some red streaks thru the area.  L>R

## 2021-03-10 DIAGNOSIS — R21 PERINEAL RASH IN FEMALE: Primary | ICD-10-CM

## 2021-03-18 ENCOUNTER — OFFICE VISIT (OUTPATIENT)
Dept: FAMILY MEDICINE CLINIC | Facility: CLINIC | Age: 81
End: 2021-03-18

## 2021-03-18 VITALS
RESPIRATION RATE: 16 BRPM | DIASTOLIC BLOOD PRESSURE: 66 MMHG | HEART RATE: 85 BPM | TEMPERATURE: 98 F | SYSTOLIC BLOOD PRESSURE: 128 MMHG | HEIGHT: 62 IN | OXYGEN SATURATION: 94 %

## 2021-03-18 DIAGNOSIS — L89.91 HEALING PRESSURE INJURY, STAGE 1: ICD-10-CM

## 2021-03-18 DIAGNOSIS — E03.9 ACQUIRED HYPOTHYROIDISM: ICD-10-CM

## 2021-03-18 DIAGNOSIS — E55.9 VITAMIN D DEFICIENCY: ICD-10-CM

## 2021-03-18 DIAGNOSIS — G14 POST POLIOMYELITIS SYNDROME: Primary | ICD-10-CM

## 2021-03-18 DIAGNOSIS — M81.8 DISUSE OSTEOPOROSIS: Chronic | ICD-10-CM

## 2021-03-18 DIAGNOSIS — M41.9 KYPHOSCOLIOSIS: Chronic | ICD-10-CM

## 2021-03-18 PROCEDURE — 84443 ASSAY THYROID STIM HORMONE: CPT | Performed by: FAMILY MEDICINE

## 2021-03-18 PROCEDURE — 82306 VITAMIN D 25 HYDROXY: CPT | Performed by: FAMILY MEDICINE

## 2021-03-18 PROCEDURE — 99214 OFFICE O/P EST MOD 30 MIN: CPT | Performed by: FAMILY MEDICINE

## 2021-03-18 PROCEDURE — 84439 ASSAY OF FREE THYROXINE: CPT | Performed by: FAMILY MEDICINE

## 2021-03-18 RX ORDER — FENTANYL 100 UG/H
1 PATCH TRANSDERMAL
Qty: 10 PATCH | Refills: 0 | Status: SHIPPED | OUTPATIENT
Start: 2021-03-18 | End: 2021-04-16 | Stop reason: SDUPTHER

## 2021-03-18 RX ORDER — HYDROCODONE BITARTRATE AND ACETAMINOPHEN 7.5; 325 MG/1; MG/1
1 TABLET ORAL DAILY PRN
Qty: 30 TABLET | Refills: 0 | Status: SHIPPED | OUTPATIENT
Start: 2021-03-18 | End: 2021-04-16 | Stop reason: SDUPTHER

## 2021-03-18 NOTE — PROGRESS NOTES
Subjective   Karina Saleem is a 80 y.o. female.     Chief Complaint   Patient presents with   • Dizziness   • Rash   • Hypothyroidism   • Pain         Current Outpatient Medications:   •  Cholecalciferol (Vitamin D) 50 MCG (2000 UT) tablet, Take 2,000 Units by mouth Daily., Disp: 90 tablet, Rfl: 1  •  diazePAM (VALIUM) 10 MG tablet, TAKE ONE TABLET BY MOUTH AT BEDTIME AS NEEDED FOR ANXIETY, Disp: 30 tablet, Rfl: 0  •  fentaNYL (DURAGESIC) 100 MCG/HR patch, Place 1 patch on the skin as directed by provider Every 72 (Seventy-Two) Hours., Disp: 10 patch, Rfl: 0  •  gabapentin (NEURONTIN) 100 MG capsule, TAKE TWO CAPSULES BY MOUTH TWICE DAILY & ONE CAPSULE AT BEDTIME, Disp: 450 capsule, Rfl: 2  •  guaiFENesin (Mucinex) 600 MG 12 hr tablet, Take 1 tablet by mouth 2 (Two) Times a Day. To thin secretions, Disp: 20 tablet, Rfl: 0  •  HYDROcodone-acetaminophen (Norco) 7.5-325 MG per tablet, Take 1 tablet by mouth Daily As Needed for Moderate Pain ., Disp: 30 tablet, Rfl: 0  •  nitroglycerin (Nitrostat) 0.4 MG SL tablet, Place 1 tablet under the tongue Every 5 (Five) Minutes As Needed for Chest Pain (to max of 3 tabs per episode)., Disp: 50 tablet, Rfl: 1  •  omeprazole (priLOSEC) 40 MG capsule, Take 1 capsule by mouth Daily., Disp: 30 capsule, Rfl: 0  •  promethazine (PHENERGAN) 25 MG tablet, TAKE ONE TABLET BY MOUTH EVERY 8 HOURS AS NEEDED FOR NAUSEA OR VOMITING, Disp: 46 tablet, Rfl: 0  •  zolpidem (AMBIEN) 10 MG tablet, TAKE ONE TABLET BY MOUTH AT BEDTIME AS NEEDED FOR SLEEP., Disp: 30 tablet, Rfl: 0  •  levothyroxine (SYNTHROID, LEVOTHROID) 175 MCG tablet, Take 1 tablet by mouth Daily., Disp: 90 tablet, Rfl: 0  •  vitamin D (ERGOCALCIFEROL) 1.25 MG (66477 UT) capsule capsule, Take 1 capsule by mouth 1 (One) Time Per Week., Disp: 12 capsule, Rfl: 1    Past Medical History:   Diagnosis Date   • Anxiety    • Cataracts, bilateral    • Femur fracture, right    • Healing pressure ulcer, unspecified pressure ulcer stage     • Hypothyroidism    • Insomnia    • Kyphosis    • MAMMO     NEG = 2019   • Neuropathy     in feet   • Osteoporosis    • Polio osteopathy of lower leg, left    • Polio osteopathy of lower leg, right    • Scoliosis        Past Surgical History:   Procedure Laterality Date   • APPENDECTOMY     • LIPOMA EXCISION Bilateral     breast   • TOTAL ABDOMINAL HYSTERECTOMY         Family History   Problem Relation Age of Onset   • Diabetes Mother    • Heart disease Mother         CHF   • Heart failure Father    • Heart disease Father    • COPD Father         BLACK LUNG   • Lymphoma Sister    • Lung cancer Brother    • Pancreatic cancer Brother    • Bone cancer Brother    • Cancer Brother        Social History     Socioeconomic History   • Marital status: Single     Spouse name: Not on file   • Number of children: Not on file   • Years of education: Not on file   • Highest education level: Not on file   Tobacco Use   • Smoking status: Never Smoker   • Smokeless tobacco: Never Used   Substance and Sexual Activity   • Alcohol use: No   • Drug use: No   • Sexual activity: Defer       79 y/o C female here for f/u on post polio synd/ post skin wound/ chronic pain/ hypothyroid    Pt is no longer dressing the wound on her back and doesn't need to see the wound clinic.....  Pt also ended up using an otc infant butt crm for her Left thigh rash and feels it is helping    Pt wanting to get the COVID shot but will prob get the Jerry one since it is one shot when it  Is avail closer to her    Pt states she hasnt really been o/s since COVID started..... trying to get a new Wheelchair so she can get o/s easier    Pain meds are working fine       The following portions of the patient's history were reviewed and updated as appropriate: allergies, current medications, past family history, past medical history, past social history, past surgical history and problem list.    Review of Systems   Constitutional: Negative for activity change,  fatigue and unexpected weight gain.   Respiratory: Negative for cough, chest tightness and shortness of breath.    Cardiovascular: Negative for chest pain, palpitations and leg swelling.   Genitourinary: Negative for frequency, urgency and urinary incontinence.   Musculoskeletal: Positive for back pain. Negative for arthralgias and myalgias.   Skin: Positive for color change and skin lesions. Negative for rash and bruise.   Neurological: Negative for dizziness, facial asymmetry, speech difficulty, weakness, light-headedness, headache, memory problem and confusion.   Psychiatric/Behavioral: Negative for sleep disturbance.       Vitals:    03/18/21 1426   BP: 128/66   Pulse: 85   Resp: 16   Temp: 98 °F (36.7 °C)   SpO2: 94%       Objective   Physical Exam  Vitals and nursing note reviewed.   Constitutional:       General: She is not in acute distress.     Appearance: She is well-developed and normal weight. She is not ill-appearing or toxic-appearing.   HENT:      Head: Normocephalic and atraumatic.   Cardiovascular:      Rate and Rhythm: Normal rate and regular rhythm.      Heart sounds: Normal heart sounds. No murmur heard.     Pulmonary:      Effort: Pulmonary effort is normal.      Breath sounds: Normal breath sounds. No stridor. No wheezing.   Musculoskeletal:         General: Deformity present.      Cervical back: Normal range of motion and neck supple.      Thoracic back: Scoliosis present.      Lumbar back: Decreased range of motion. Scoliosis present.      Right lower leg: Deformity present.      Left lower leg: Deformity present.        Legs:       Comments: +flaccid LE b/l   Skin:     General: Skin is warm and dry.      Findings: Wound present. No rash.          Neurological:      Mental Status: She is alert and oriented to person, place, and time.   Psychiatric:         Attention and Perception: Attention and perception normal.         Mood and Affect: Mood and affect normal.         Speech: Speech normal.          Behavior: Behavior normal. Behavior is cooperative.         Thought Content: Thought content normal.         Cognition and Memory: Cognition and memory normal.         Judgment: Judgment normal.     bacitracin oint w/ zn placed and telfa placed over dry pink skin on post thoracolumbar area       Assessment/Plan   Diagnoses and all orders for this visit:    1. Post poliomyelitis syndrome (Primary)  -     fentaNYL (DURAGESIC) 100 MCG/HR patch; Place 1 patch on the skin as directed by provider Every 72 (Seventy-Two) Hours.  Dispense: 10 patch; Refill: 0  -     HYDROcodone-acetaminophen (Norco) 7.5-325 MG per tablet; Take 1 tablet by mouth Daily As Needed for Moderate Pain .  Dispense: 30 tablet; Refill: 0    2. Healing pressure injury, stage 1    3. Acquired hypothyroidism  -     T4, Free  -     TSH    4. Vitamin D deficiency  -     Vitamin D 25 Hydroxy    5. Disuse osteoporosis    6. Kyphoscoliosis    Pt to cont w/ padding the pos aspect of trunk to limit pressure and skin opening back up  covid vaccine when poss  Cont home meds/ doses  F/u labs today

## 2021-03-19 DIAGNOSIS — E55.9 VITAMIN D DEFICIENCY: Primary | ICD-10-CM

## 2021-03-19 DIAGNOSIS — E55.9 VITAMIN D DEFICIENCY: ICD-10-CM

## 2021-03-19 DIAGNOSIS — E03.9 ACQUIRED HYPOTHYROIDISM: ICD-10-CM

## 2021-03-19 LAB
25(OH)D3 SERPL-MCNC: 28.7 NG/ML (ref 30–100)
T4 FREE SERPL-MCNC: 0.88 NG/DL (ref 0.93–1.7)
TSH SERPL DL<=0.05 MIU/L-ACNC: 6.56 UIU/ML (ref 0.27–4.2)

## 2021-03-19 RX ORDER — ERGOCALCIFEROL 1.25 MG/1
50000 CAPSULE ORAL WEEKLY
Qty: 12 CAPSULE | Refills: 1 | Status: SHIPPED | OUTPATIENT
Start: 2021-03-19 | End: 2021-06-05 | Stop reason: SDUPTHER

## 2021-03-19 RX ORDER — LEVOTHYROXINE SODIUM 175 UG/1
175 TABLET ORAL DAILY
Qty: 90 TABLET | Refills: 0 | Status: SHIPPED | OUTPATIENT
Start: 2021-03-19 | End: 2021-06-05

## 2021-03-25 DIAGNOSIS — F51.01 PRIMARY INSOMNIA: ICD-10-CM

## 2021-03-25 RX ORDER — ZOLPIDEM TARTRATE 10 MG/1
10 TABLET ORAL NIGHTLY PRN
Qty: 30 TABLET | Refills: 0 | Status: SHIPPED | OUTPATIENT
Start: 2021-03-25 | End: 2021-04-22 | Stop reason: SDUPTHER

## 2021-03-26 DIAGNOSIS — F41.9 ANXIETY: ICD-10-CM

## 2021-03-26 RX ORDER — DIAZEPAM 10 MG/1
10 TABLET ORAL NIGHTLY PRN
Qty: 30 TABLET | Refills: 0 | Status: SHIPPED | OUTPATIENT
Start: 2021-03-26 | End: 2021-04-22 | Stop reason: SDUPTHER

## 2021-04-15 DIAGNOSIS — G14 POST POLIOMYELITIS SYNDROME: ICD-10-CM

## 2021-04-15 RX ORDER — HYDROCODONE BITARTRATE AND ACETAMINOPHEN 7.5; 325 MG/1; MG/1
1 TABLET ORAL DAILY PRN
Qty: 30 TABLET | Refills: 0 | Status: CANCELLED | OUTPATIENT
Start: 2021-04-15

## 2021-04-15 RX ORDER — FENTANYL 100 UG/H
1 PATCH TRANSDERMAL
Qty: 10 PATCH | Refills: 0 | Status: CANCELLED | OUTPATIENT
Start: 2021-04-15

## 2021-04-15 NOTE — TELEPHONE ENCOUNTER
Caller: Karina Saleem    Relationship: Self    Best call back number: [phone]     Medication needed:   Requested Prescriptions     Pending Prescriptions Disp Refills   • fentaNYL (DURAGESIC) 100 MCG/HR patch 10 patch 0     Sig: Place 1 patch on the skin as directed by provider Every 72 (Seventy-Two) Hours.   • HYDROcodone-acetaminophen (Norco) 7.5-325 MG per tablet 30 tablet 0     Sig: Take 1 tablet by mouth Daily As Needed for Moderate Pain .       When do you need the refill by: ASAP    Does the patient have less than a 3 day supply:  [x] Yes  [] No    What is the patient's preferred pharmacy: [address] [phone] [facility] [phone] FX

## 2021-04-16 DIAGNOSIS — G14 POST POLIOMYELITIS SYNDROME: ICD-10-CM

## 2021-04-16 RX ORDER — FENTANYL 100 UG/H
1 PATCH TRANSDERMAL
Qty: 10 PATCH | Refills: 0 | Status: SHIPPED | OUTPATIENT
Start: 2021-04-16 | End: 2021-05-13 | Stop reason: SDUPTHER

## 2021-04-16 RX ORDER — HYDROCODONE BITARTRATE AND ACETAMINOPHEN 7.5; 325 MG/1; MG/1
1 TABLET ORAL DAILY PRN
Qty: 30 TABLET | Refills: 0 | Status: SHIPPED | OUTPATIENT
Start: 2021-04-16 | End: 2021-05-17 | Stop reason: SDUPTHER

## 2021-04-16 RX ORDER — PROMETHAZINE HYDROCHLORIDE 25 MG/1
25 TABLET ORAL EVERY 8 HOURS PRN
Qty: 46 TABLET | Refills: 0 | Status: SHIPPED | OUTPATIENT
Start: 2021-04-16 | End: 2021-05-19 | Stop reason: SDUPTHER

## 2021-04-16 NOTE — TELEPHONE ENCOUNTER
PATIENT IS CALLING TO CHECK IN ON REFILLS PHARMACY DOES NOT HAVE.     PLEASE ADVISE    CALL BACK NUMBER   [phone]

## 2021-04-16 NOTE — TELEPHONE ENCOUNTER
Patient called again today requesting refills be sent today so they can be delivered tomorrow.  If they dont receive the rx today, they wont be delivered until next week and patient will run out.

## 2021-04-20 ENCOUNTER — TELEPHONE (OUTPATIENT)
Dept: FAMILY MEDICINE CLINIC | Facility: CLINIC | Age: 81
End: 2021-04-20

## 2021-04-20 NOTE — TELEPHONE ENCOUNTER
"Received fax from [facility] that patient is \"in need of a new manual wheelchair and pressure reducing jell cushion\"  "

## 2021-04-21 NOTE — TELEPHONE ENCOUNTER
HUB to share    Does pt want the new manual wheelchair and pressure reducing jell cushion that  Home Health are recommending? If so, what company would she like to get the equip from?    Attempted to call pt - RNA, and the answering machine said it was off.

## 2021-04-22 DIAGNOSIS — F51.01 PRIMARY INSOMNIA: ICD-10-CM

## 2021-04-22 DIAGNOSIS — F41.9 ANXIETY: ICD-10-CM

## 2021-04-22 RX ORDER — ZOLPIDEM TARTRATE 10 MG/1
10 TABLET ORAL NIGHTLY PRN
Qty: 30 TABLET | Refills: 0 | Status: SHIPPED | OUTPATIENT
Start: 2021-04-22 | End: 2021-05-19 | Stop reason: SDUPTHER

## 2021-04-22 RX ORDER — DIAZEPAM 10 MG/1
10 TABLET ORAL NIGHTLY PRN
Qty: 30 TABLET | Refills: 0 | Status: SHIPPED | OUTPATIENT
Start: 2021-04-22 | End: 2021-05-19 | Stop reason: SDUPTHER

## 2021-04-22 NOTE — TELEPHONE ENCOUNTER
PATIENT IS RETURNING CALL BACK TO DR CARRERO TO LET HER KNOW SHE IS WANTING THE GEL CUSHION WHEELCHAIR FROM [employer].    Patient is a 45y old  Male who presents with a chief complaint of ETOH abuse    SUBJECTIVE / OVERNIGHT EVENTS: overnight around 830 PM started feeling itchiness in the palms of both hands, otherwise felt ok and went ot bed, this morning when he woke up he noticed diffuse rash / redness on palms of both hand with blistering on L. no discrete rash on dorsal aspect of hand or other parts of the body, no fever, no mouth lesions.     ROS:  No HA/DZ  No Vision changes   No CP, SOB  No N/V/D  No Edema  NO weakness, numbness    MEDICATIONS  (STANDING):  chlordiazePOXIDE   Oral   chlordiazePOXIDE 50 milliGRAM(s) Oral every 12 hours  escitalopram 10 milliGRAM(s) Oral daily  folic acid 1 milliGRAM(s) Oral daily  multivitamin 1 Tablet(s) Oral daily  thiamine 100 milliGRAM(s) Oral daily    MEDICATIONS  (PRN):  chlordiazePOXIDE 50 milliGRAM(s) Oral every 1 hour PRN Symptom-triggered: each CIWA -Ar score 8 or GREATER  diphenhydrAMINE   Capsule 50 milliGRAM(s) Oral every 6 hours PRN allergic reaction  traZODone 50 milliGRAM(s) Oral at bedtime PRN insomnia      T(C): 36.8 (08-15-18 @ 22:00)  HR: 67 (08-15-18 @ 22:00)  BP: 131/72 (08-15-18 @ 22:00)  RR: 18 (08-15-18 @ 13:35)  SpO2: --  CAPILLARY BLOOD GLUCOSE        I&O's Summary      PHYSICAL EXAM:  GENERAL: NAD, well-developed, AOx3  HEAD:  Atraumatic, Normocephalic  EYES: EOMI, PERRL, conjunctiva and sclera clear  NECK: Supple, No JVD  CHEST/LUNG: Clear to auscultation bilaterally  HEART: Regular rate and rhythm; No murmurs, rubs, or gallops, No Edema  ABDOMEN: Soft, Nontender, Nondistended; Bowel sounds present  EXTREMITIES:  2+ Peripheral Pulses, No clubbing, cyanosis  PSYCH: No SI/HI  NEUROLOGY: non-focal  SKIN: papular diffuse well demarcated rash on palmar aspect of both hands associated with L blistering, no exudate, no sloughing off skin, no bleed.     LABS:    08-16    142  |  100  |  10  ----------------------------<  69<L>  3.6   |  22  |  0.65    Ca    9.0      16 Aug 2018 09:15    TPro  6.3  /  Alb  4.2  /  TBili  0.9  /  DBili  x   /  AST  193<H>  /  ALT  113<H>  /  AlkPhos  116  08-16                  RADIOLOGY & ADDITIONAL TESTS:    Imaging Personally Reviewed:    Consultant(s) Notes Reviewed:      Care Discussed with Consultants/Other Providers: psych - Dr Rios [place] resident

## 2021-05-13 ENCOUNTER — TELEPHONE (OUTPATIENT)
Dept: FAMILY MEDICINE CLINIC | Facility: CLINIC | Age: 81
End: 2021-05-13

## 2021-05-13 DIAGNOSIS — G14 POST POLIOMYELITIS SYNDROME: ICD-10-CM

## 2021-05-13 RX ORDER — FENTANYL 100 UG/H
1 PATCH TRANSDERMAL
Qty: 10 PATCH | Refills: 0 | Status: SHIPPED | OUTPATIENT
Start: 2021-05-13 | End: 2021-06-14 | Stop reason: SDUPTHER

## 2021-05-13 NOTE — TELEPHONE ENCOUNTER
Caller: Karina Saleem    Relationship: Self    Best call back number: [phone] (H)    Medication needed:   HYDROcodone-acetaminophen (Norco) 7.5-325 MG per tablet  1 tablet, Daily PRN 0 ordered         Summary: Take 1 tablet by mouth Daily As Needed for Moderate Pain ., Starting Fri 4/16/2021, Normal            When do you need the refill by: ASAP    What additional details did the patient provide when requesting the medication: PATIENT CALLED TO REQUEST A MEDICATION REFILL ON HER MEDICATION. PATIENT STATES THAT SHE IS COMPLETELY OUT OF MEDICATION.       Does the patient have less than a 3 day supply:  [x] Yes  [] No    What is the patient's preferred pharmacy:      [address] [phone] [facility] [phone] FX    THANKS

## 2021-05-17 DIAGNOSIS — G14 POST POLIOMYELITIS SYNDROME: ICD-10-CM

## 2021-05-17 RX ORDER — HYDROCODONE BITARTRATE AND ACETAMINOPHEN 7.5; 325 MG/1; MG/1
1 TABLET ORAL DAILY PRN
Qty: 30 TABLET | Refills: 0 | Status: SHIPPED | OUTPATIENT
Start: 2021-05-17 | End: 2021-06-14 | Stop reason: SDUPTHER

## 2021-05-17 NOTE — TELEPHONE ENCOUNTER
Caller: Karina Saleem    Relationship to patient: Self    Best call back number: [phone]     Patient is needing: PATIENT CALLED BACK TO CHECK ON THE STATUS OF THIS REFILL.

## 2021-05-19 DIAGNOSIS — F41.9 ANXIETY: ICD-10-CM

## 2021-05-19 DIAGNOSIS — F51.01 PRIMARY INSOMNIA: ICD-10-CM

## 2021-05-19 RX ORDER — DIAZEPAM 10 MG/1
10 TABLET ORAL NIGHTLY PRN
Qty: 30 TABLET | Refills: 0 | Status: SHIPPED | OUTPATIENT
Start: 2021-05-19 | End: 2021-06-16 | Stop reason: SDUPTHER

## 2021-05-19 RX ORDER — ZOLPIDEM TARTRATE 10 MG/1
10 TABLET ORAL NIGHTLY PRN
Qty: 30 TABLET | Refills: 0 | Status: SHIPPED | OUTPATIENT
Start: 2021-05-19 | End: 2021-06-16 | Stop reason: SDUPTHER

## 2021-05-19 RX ORDER — PROMETHAZINE HYDROCHLORIDE 25 MG/1
25 TABLET ORAL EVERY 8 HOURS PRN
Qty: 46 TABLET | Refills: 0 | Status: SHIPPED | OUTPATIENT
Start: 2021-05-19 | End: 2021-06-15

## 2021-05-19 NOTE — TELEPHONE ENCOUNTER
Diazepam 10mg  Promethazine 25mg  Zolpidem 10mg    [facility]    Last seen 3/18/2021  Next Appt 6/1/2021

## 2021-05-24 ENCOUNTER — TELEPHONE (OUTPATIENT)
Dept: FAMILY MEDICINE CLINIC | Facility: CLINIC | Age: 81
End: 2021-05-24

## 2021-05-24 NOTE — TELEPHONE ENCOUNTER
Pt calling requesting magic butt cream.  [facility].  Last seen 3/21

## 2021-05-25 ENCOUNTER — TELEPHONE (OUTPATIENT)
Dept: FAMILY MEDICINE CLINIC | Facility: CLINIC | Age: 81
End: 2021-05-25

## 2021-05-27 NOTE — TELEPHONE ENCOUNTER
HUB to read.     I called [facility] about magic butt cream [place] denial for an appeal since the nystatin and hydrocortisone was approved but the zinc and bacitracin was denied. They will review it and let us know.

## 2021-06-03 ENCOUNTER — OFFICE VISIT (OUTPATIENT)
Dept: FAMILY MEDICINE CLINIC | Facility: CLINIC | Age: 81
End: 2021-06-03

## 2021-06-03 VITALS
DIASTOLIC BLOOD PRESSURE: 76 MMHG | SYSTOLIC BLOOD PRESSURE: 150 MMHG | OXYGEN SATURATION: 93 % | RESPIRATION RATE: 18 BRPM | TEMPERATURE: 97.8 F | HEIGHT: 62 IN | HEART RATE: 80 BPM

## 2021-06-03 DIAGNOSIS — E03.9 ACQUIRED HYPOTHYROIDISM: Chronic | ICD-10-CM

## 2021-06-03 DIAGNOSIS — K21.9 GASTROESOPHAGEAL REFLUX DISEASE WITHOUT ESOPHAGITIS: Chronic | ICD-10-CM

## 2021-06-03 DIAGNOSIS — R21 RASH AND NONSPECIFIC SKIN ERUPTION: ICD-10-CM

## 2021-06-03 DIAGNOSIS — M41.9 KYPHOSCOLIOSIS: ICD-10-CM

## 2021-06-03 DIAGNOSIS — H92.01 PAIN BEHIND THE EAR, RIGHT: Primary | ICD-10-CM

## 2021-06-03 DIAGNOSIS — E55.9 VITAMIN D DEFICIENCY: Chronic | ICD-10-CM

## 2021-06-03 DIAGNOSIS — G14 POST POLIOMYELITIS SYNDROME: Chronic | ICD-10-CM

## 2021-06-03 PROBLEM — R29.6 REPEATED FALLS: Status: ACTIVE | Noted: 2018-08-16

## 2021-06-03 PROBLEM — D50.9 IRON DEFICIENCY ANEMIA: Status: ACTIVE | Noted: 2018-08-10

## 2021-06-03 PROBLEM — B91: Status: ACTIVE | Noted: 2018-08-16

## 2021-06-03 PROBLEM — M62.3 IMMOBILITY SYNDROME (PARAPLEGIC): Status: ACTIVE | Noted: 2018-08-16

## 2021-06-03 PROBLEM — M62.442: Status: ACTIVE | Noted: 2018-08-16

## 2021-06-03 PROBLEM — M62.441: Status: ACTIVE | Noted: 2018-08-16

## 2021-06-03 PROBLEM — M89.69: Status: ACTIVE | Noted: 2018-08-16

## 2021-06-03 PROBLEM — F41.9 ANXIETY DISORDER, UNSPECIFIED: Status: ACTIVE | Noted: 2018-08-10

## 2021-06-03 PROBLEM — M54.50 LOW BACK PAIN: Status: ACTIVE | Noted: 2018-08-10

## 2021-06-03 PROBLEM — I10 ESSENTIAL (PRIMARY) HYPERTENSION: Status: ACTIVE | Noted: 2018-08-10

## 2021-06-03 PROBLEM — M62.81 GENERALIZED MUSCLE WEAKNESS: Status: ACTIVE | Noted: 2018-08-13

## 2021-06-03 PROBLEM — S21.109D: Status: ACTIVE | Noted: 2018-10-22

## 2021-06-03 PROCEDURE — 82306 VITAMIN D 25 HYDROXY: CPT | Performed by: FAMILY MEDICINE

## 2021-06-03 PROCEDURE — 84439 ASSAY OF FREE THYROXINE: CPT | Performed by: FAMILY MEDICINE

## 2021-06-03 PROCEDURE — 36415 COLL VENOUS BLD VENIPUNCTURE: CPT | Performed by: FAMILY MEDICINE

## 2021-06-03 PROCEDURE — 99213 OFFICE O/P EST LOW 20 MIN: CPT | Performed by: FAMILY MEDICINE

## 2021-06-03 PROCEDURE — 84443 ASSAY THYROID STIM HORMONE: CPT | Performed by: FAMILY MEDICINE

## 2021-06-03 RX ORDER — TRIAMCINOLONE ACETONIDE 1 MG/G
CREAM TOPICAL
Qty: 80 G | Refills: 0 | Status: SHIPPED | OUTPATIENT
Start: 2021-06-03 | End: 2021-12-13

## 2021-06-03 RX ORDER — OMEPRAZOLE 40 MG/1
40 CAPSULE, DELAYED RELEASE ORAL DAILY
Qty: 90 CAPSULE | Refills: 0 | Status: SHIPPED | OUTPATIENT
Start: 2021-06-03 | End: 2021-08-30 | Stop reason: SDUPTHER

## 2021-06-03 NOTE — PROGRESS NOTES
Subjective   Karina Saleem is a 80 y.o. female.     Chief Complaint   Patient presents with   • Earache     Hurts behind her right ear.   • Rash     Left leg         Current Outpatient Medications:   •  gabapentin (NEURONTIN) 100 MG capsule, TAKE TWO CAPSULES BY MOUTH TWICE DAILY & ONE CAPSULE AT BEDTIME, Disp: 450 capsule, Rfl: 2  •  guaiFENesin (Mucinex) 600 MG 12 hr tablet, Take 1 tablet by mouth 2 (Two) Times a Day. To thin secretions, Disp: 20 tablet, Rfl: 0  •  nitroglycerin (Nitrostat) 0.4 MG SL tablet, Place 1 tablet under the tongue Every 5 (Five) Minutes As Needed for Chest Pain (to max of 3 tabs per episode)., Disp: 50 tablet, Rfl: 1  •  omeprazole (priLOSEC) 40 MG capsule, Take 1 capsule by mouth Daily., Disp: 90 capsule, Rfl: 0  •  diazePAM (VALIUM) 10 MG tablet, Take 1 tablet by mouth At Night As Needed for Anxiety., Disp: 30 tablet, Rfl: 0  •  fentaNYL (DURAGESIC) 100 MCG/HR patch, Place 1 patch on the skin as directed by provider Every 72 (Seventy-Two) Hours., Disp: 10 patch, Rfl: 0  •  HYDROcodone-acetaminophen (Norco) 7.5-325 MG per tablet, Take 1 tablet by mouth Daily As Needed for Moderate Pain ., Disp: 30 tablet, Rfl: 0  •  levothyroxine (SYNTHROID, LEVOTHROID) 200 MCG tablet, Take 1 tablet by mouth Daily., Disp: 90 tablet, Rfl: 0  •  promethazine (PHENERGAN) 25 MG tablet, TAKE ONE TABLET BY MOUTH EVERY 8 HOURS AS NEEDED FOR NAUSEA OR vomiting, Disp: 46 tablet, Rfl: 0  •  triamcinolone (KENALOG) 0.1 % cream, APPLY TO LEFT HIP RASH BID PRN, Disp: 80 g, Rfl: 0  •  vitamin D (ERGOCALCIFEROL) 1.25 MG (04523 UT) capsule capsule, Take 1 capsule by mouth 1 (One) Time Per Week., Disp: 12 capsule, Rfl: 1  •  zolpidem (AMBIEN) 10 MG tablet, Take 1 tablet by mouth At Night As Needed for Sleep. for sleep, Disp: 30 tablet, Rfl: 0    Past Medical History:   Diagnosis Date   • Anxiety    • Cataracts, bilateral    • Femur fracture, right    • Healing pressure ulcer, unspecified pressure ulcer stage    •  Hypothyroidism    • Insomnia    • Kyphoscoliosis    • MAMMO     NEG = 2019   • Neuropathy     in feet   • Osteoporosis    • Paraplegic immobility syndrome    • Polio osteopathy of lower leg, left    • Polio osteopathy of lower leg, right    • Scoliosis        Past Surgical History:   Procedure Laterality Date   • APPENDECTOMY     • LIPOMA EXCISION Bilateral     breast   • TOTAL ABDOMINAL HYSTERECTOMY         Family History   Problem Relation Age of Onset   • Diabetes Mother    • Heart disease Mother         CHF   • Heart failure Father    • Heart disease Father    • COPD Father         BLACK LUNG   • Lymphoma Sister    • Lung cancer Brother    • Pancreatic cancer Brother    • Bone cancer Brother    • Cancer Brother        Social History     Socioeconomic History   • Marital status: Single     Spouse name: Not on file   • Number of children: Not on file   • Years of education: Not on file   • Highest education level: Not on file   Tobacco Use   • Smoking status: Never Smoker   • Smokeless tobacco: Never Used   Vaping Use   • Vaping Use: Never used   Substance and Sexual Activity   • Alcohol use: No   • Drug use: No   • Sexual activity: Defer       81 y/o C female here w/ c/o's Rt post auricular pain/ Left thigh rash w/ hxo Post-polio syndrome/ paraplegia    Pt states started weeks ago-----no tx tried; no pain w/ chewing; never had in past/ no other URI symptoms    Pt states ins wouldn't cover the gray's magic butt rash crm so she tried otc steroid which seems to help some    Pt doesn't go out since confined to wheelchair and no caregivers------pt has not gotten her COVID shots yet due to limited transportation/ safety       The following portions of the patient's history were reviewed and updated as appropriate: allergies, current medications, past family history, past medical history, past social history, past surgical history and problem list.    Review of Systems   Constitutional: Negative for activity change,  appetite change, chills, fatigue and fever.   HENT: Positive for ear pain. Negative for congestion, ear discharge, postnasal drip, rhinorrhea, sinus pressure, sneezing, sore throat, swollen glands and trouble swallowing.    Eyes: Negative for pain, discharge, redness, itching and visual disturbance.   Respiratory: Negative for cough, shortness of breath, wheezing and stridor.    Skin: Positive for color change and dry skin. Negative for rash.   Allergic/Immunologic: Negative for environmental allergies.   Psychiatric/Behavioral: Negative for sleep disturbance.       Vitals:    06/03/21 1506   BP: 150/76   Pulse: 80   Resp: 18   Temp: 97.8 °F (36.6 °C)   SpO2: 93%       Objective   Physical Exam  Vitals and nursing note reviewed.   Constitutional:       Appearance: She is well-developed.   HENT:      Head: Normocephalic and atraumatic.      Jaw: No tenderness, swelling or pain on movement.      Right Ear: Tympanic membrane, ear canal and external ear normal.      Left Ear: Tympanic membrane, ear canal and external ear normal.   Cardiovascular:      Rate and Rhythm: Normal rate and regular rhythm.      Heart sounds: Normal heart sounds. No murmur heard.     Pulmonary:      Effort: Pulmonary effort is normal. No respiratory distress.      Breath sounds: Normal breath sounds. No stridor. No wheezing, rhonchi or rales.   Musculoskeletal:      Cervical back: Normal range of motion and neck supple.   Lymphadenopathy:      Head:      Right side of head: No submental, submandibular, preauricular, posterior auricular or occipital adenopathy.      Left side of head: No submental, submandibular, preauricular, posterior auricular or occipital adenopathy.   Skin:     General: Skin is warm and dry.      Findings: Rash present.          Neurological:      Mental Status: She is alert and oriented to person, place, and time.      Cranial Nerves: No cranial nerve deficit.   Psychiatric:         Mood and Affect: Mood normal.          Behavior: Behavior normal.         Thought Content: Thought content normal.         Judgment: Judgment normal.           Assessment/Plan   Diagnoses and all orders for this visit:    1. Pain behind the ear, right (Primary)    2. Rash and nonspecific skin eruption    3. Post poliomyelitis syndrome    4. Kyphoscoliosis    5. Gastroesophageal reflux disease without esophagitis    6. Vitamin D deficiency    7. Acquired hypothyroidism    Other orders  -     triamcinolone (KENALOG) 0.1 % cream; APPLY TO LEFT HIP RASH BID PRN  Dispense: 80 g; Refill: 0  -     omeprazole (priLOSEC) 40 MG capsule; Take 1 capsule by mouth Daily.  Dispense: 90 capsule; Refill: 0    Trial of med strength steroid crm prn----call INB or worsens  Cont to monitor ear discomfort-----trial of topical tx w/ heat/ cold

## 2021-06-04 LAB
25(OH)D3 SERPL-MCNC: 23.7 NG/ML (ref 30–100)
T4 FREE SERPL-MCNC: 0.89 NG/DL (ref 0.93–1.7)
TSH SERPL DL<=0.05 MIU/L-ACNC: 4.61 UIU/ML (ref 0.27–4.2)

## 2021-06-05 RX ORDER — LEVOTHYROXINE SODIUM 0.2 MG/1
200 TABLET ORAL DAILY
Qty: 90 TABLET | Refills: 0 | Status: SHIPPED | OUTPATIENT
Start: 2021-06-05 | End: 2021-12-03 | Stop reason: SDUPTHER

## 2021-06-05 RX ORDER — ERGOCALCIFEROL 1.25 MG/1
50000 CAPSULE ORAL WEEKLY
Qty: 12 CAPSULE | Refills: 1 | Status: SHIPPED | OUTPATIENT
Start: 2021-06-05 | End: 2021-08-30 | Stop reason: SDUPTHER

## 2021-06-07 ENCOUNTER — TELEPHONE (OUTPATIENT)
Dept: FAMILY MEDICINE CLINIC | Facility: CLINIC | Age: 81
End: 2021-06-07

## 2021-06-07 NOTE — TELEPHONE ENCOUNTER
HUB to share    Thyroid too low----sent out higher dose and will rech in 2mos or so  Vit D low-----has she been taking the once a week vit D???  Resent Rx and will rech in 3+mos    Pt was informed but stated confusion on recent med that was delivered. She is going to check her bottles and let us know. She has been taking the high dose Vit D.

## 2021-06-14 DIAGNOSIS — G14 POST POLIOMYELITIS SYNDROME: ICD-10-CM

## 2021-06-14 RX ORDER — HYDROCODONE BITARTRATE AND ACETAMINOPHEN 7.5; 325 MG/1; MG/1
1 TABLET ORAL DAILY PRN
Qty: 30 TABLET | Refills: 0 | Status: SHIPPED | OUTPATIENT
Start: 2021-06-14 | End: 2021-07-12 | Stop reason: SDUPTHER

## 2021-06-14 RX ORDER — FENTANYL 100 UG/H
1 PATCH TRANSDERMAL
Qty: 10 PATCH | Refills: 0 | Status: SHIPPED | OUTPATIENT
Start: 2021-06-14 | End: 2021-07-26 | Stop reason: SDUPTHER

## 2021-06-14 NOTE — TELEPHONE ENCOUNTER
Caller: Karina Saleem    Relationship: Self    Best call back number: [phone]     Medication needed:   Requested Prescriptions     Pending Prescriptions Disp Refills   • HYDROcodone-acetaminophen (Norco) 7.5-325 MG per tablet 30 tablet 0     Sig: Take 1 tablet by mouth Daily As Needed for Moderate Pain .   • fentaNYL (DURAGESIC) 100 MCG/HR patch 10 patch 0     Sig: Place 1 patch on the skin as directed by provider Every 72 (Seventy-Two) Hours.       When do you need the refill by: TODAY    What additional details did the patient provide when requesting the medication:     1 DAY LEFT  PAIN BEHIND PATIENTS RIGHT EAR HAS NOT GOTTEN ANY BETTER. SHE STATES IT IS WORSE.   PLEASE CALL PATIENT BACK REGARDING THIS PLEASE    Does the patient have less than a 3 day supply:  [x] Yes  [] No    What is the patient's preferred pharmacy: [facility] [place] [address] - [phone] [facility] [phone] FX

## 2021-06-15 RX ORDER — PROMETHAZINE HYDROCHLORIDE 25 MG/1
TABLET ORAL
Qty: 46 TABLET | Refills: 0 | Status: SHIPPED | OUTPATIENT
Start: 2021-06-15 | End: 2021-08-30 | Stop reason: SDUPTHER

## 2021-06-16 DIAGNOSIS — F51.01 PRIMARY INSOMNIA: ICD-10-CM

## 2021-06-16 DIAGNOSIS — F41.9 ANXIETY: ICD-10-CM

## 2021-06-16 RX ORDER — ZOLPIDEM TARTRATE 10 MG/1
10 TABLET ORAL NIGHTLY PRN
Qty: 30 TABLET | Refills: 0 | Status: SHIPPED | OUTPATIENT
Start: 2021-06-16 | End: 2021-07-15 | Stop reason: SDUPTHER

## 2021-06-16 RX ORDER — DIAZEPAM 10 MG/1
10 TABLET ORAL NIGHTLY PRN
Qty: 30 TABLET | Refills: 0 | Status: SHIPPED | OUTPATIENT
Start: 2021-06-16 | End: 2021-07-15 | Stop reason: SDUPTHER

## 2021-07-12 DIAGNOSIS — G14 POST POLIOMYELITIS SYNDROME: ICD-10-CM

## 2021-07-12 RX ORDER — HYDROCODONE BITARTRATE AND ACETAMINOPHEN 7.5; 325 MG/1; MG/1
1 TABLET ORAL DAILY PRN
Qty: 30 TABLET | Refills: 0 | Status: SHIPPED | OUTPATIENT
Start: 2021-07-12 | End: 2021-08-11 | Stop reason: SDUPTHER

## 2021-07-12 NOTE — TELEPHONE ENCOUNTER
Last visit:  6/3/21  Next visit:9/16/21  Last labs: 6/3/21    Rx requested: Hydrocodone   [facility]

## 2021-07-12 NOTE — TELEPHONE ENCOUNTER
Caller: Karina Saleem    Relationship: Self    Best call back number: [phone]    Medication needed:   Requested Prescriptions     Pending Prescriptions Disp Refills   • HYDROcodone-acetaminophen (Norco) 7.5-325 MG per tablet 30 tablet 0     Sig: Take 1 tablet by mouth Daily As Needed for Moderate Pain .       When do you need the refill by: ASAP    Does the patient have less than a 3 day supply:  [x] Yes  [] No    What is the patient's preferred pharmacy: [facility] [address] [phone] [facility] [phone] FX

## 2021-07-15 DIAGNOSIS — F51.01 PRIMARY INSOMNIA: ICD-10-CM

## 2021-07-15 DIAGNOSIS — F41.9 ANXIETY: ICD-10-CM

## 2021-07-15 RX ORDER — DIAZEPAM 10 MG/1
10 TABLET ORAL NIGHTLY PRN
Qty: 30 TABLET | Refills: 0 | Status: SHIPPED | OUTPATIENT
Start: 2021-07-15 | End: 2021-08-12 | Stop reason: SDUPTHER

## 2021-07-15 RX ORDER — ZOLPIDEM TARTRATE 10 MG/1
10 TABLET ORAL NIGHTLY PRN
Qty: 30 TABLET | Refills: 0 | Status: SHIPPED | OUTPATIENT
Start: 2021-07-15 | End: 2021-08-12 | Stop reason: SDUPTHER

## 2021-07-15 NOTE — TELEPHONE ENCOUNTER
Caller: Karina Saleem    Relationship: Self    Best call back number:[phone] (H)    Medication needed:   Requested Prescriptions     Pending Prescriptions Disp Refills   • zolpidem (AMBIEN) 10 MG tablet 30 tablet 0     Sig: Take 1 tablet by mouth At Night As Needed for Sleep. for sleep   • diazePAM (VALIUM) 10 MG tablet 30 tablet 0     Sig: Take 1 tablet by mouth At Night As Needed for Anxiety.       When do you need the refill by: 07/15/21    What additional details did the patient provide when requesting the medication: PATIENT STATES HAS 2 LEFT    Does the patient have less than a 3 day supply:  [x] Yes  [] No    What is the patient's preferred pharmacy: [address] [phone] [facility] [phone] FX

## 2021-07-26 DIAGNOSIS — G14 POST POLIOMYELITIS SYNDROME: ICD-10-CM

## 2021-07-26 RX ORDER — FENTANYL 100 UG/H
1 PATCH TRANSDERMAL
Qty: 10 PATCH | Refills: 0 | Status: SHIPPED | OUTPATIENT
Start: 2021-07-26 | End: 2021-11-15 | Stop reason: SDUPTHER

## 2021-07-26 NOTE — TELEPHONE ENCOUNTER
Last visit:  6/3/21  Next visit: 9/16/21  Last labs: 6/3/21    Rx requested: Fentanyl patch   [facility] [place]

## 2021-07-26 NOTE — TELEPHONE ENCOUNTER
Caller: Karina Saleem    Relationship: Self    Best call back number:   Medication needed:   Requested Prescriptions     Pending Prescriptions Disp Refills   • fentaNYL (DURAGESIC) 100 MCG/HR patch 10 patch 0     Sig: Place 1 patch on the skin as directed by provider Every 72 (Seventy-Two) Hours.       When do you need the refill by:     What additional details did the patient provide when requesting the medication:     Does the patient have less than a 3 day supply:  [x] Yes  [] No    What is the patient's preferred pharmacy:   [facility]  [address] [place], [place] [address]  [phone]

## 2021-07-29 ENCOUNTER — TELEPHONE (OUTPATIENT)
Dept: FAMILY MEDICINE CLINIC | Facility: CLINIC | Age: 81
End: 2021-07-29

## 2021-07-29 RX ORDER — ORPHENADRINE CITRATE 100 MG/1
100 TABLET, EXTENDED RELEASE ORAL DAILY PRN
Qty: 20 TABLET | Refills: 0 | Status: SHIPPED | OUTPATIENT
Start: 2021-07-29 | End: 2023-02-13

## 2021-07-29 NOTE — TELEPHONE ENCOUNTER
Pt having some tight neck ms pain off/on and usu the valium helps during the night but during the day, it can be really bad    Disc'd try heat/ streching/ and will try norflex prn to see if helps

## 2021-07-29 NOTE — TELEPHONE ENCOUNTER
PATIENT IS CALLING TO SEE IF DO ENEDINA CARRERO WILL GIVE HER A CALL.  PATIENT IS STILL HAVING SEVERE PAIN BEHIND HER RIGHT EAR.  PLEASE CONTACT HER @ [phone].

## 2021-07-31 RX ORDER — NITROGLYCERIN 0.4 MG/1
0.4 TABLET SUBLINGUAL
Qty: 50 TABLET | Refills: 1 | Status: SHIPPED | OUTPATIENT
Start: 2021-07-31 | End: 2022-09-08

## 2021-08-11 DIAGNOSIS — G14 POST POLIOMYELITIS SYNDROME: ICD-10-CM

## 2021-08-11 RX ORDER — HYDROCODONE BITARTRATE AND ACETAMINOPHEN 7.5; 325 MG/1; MG/1
1 TABLET ORAL DAILY PRN
Qty: 30 TABLET | Refills: 0 | Status: SHIPPED | OUTPATIENT
Start: 2021-08-11 | End: 2021-09-09 | Stop reason: SDUPTHER

## 2021-08-11 NOTE — TELEPHONE ENCOUNTER
Caller: Karina Saleem    Relationship: Self    Best call back number: [phone]     Medication needed:   Requested Prescriptions     Pending Prescriptions Disp Refills   • HYDROcodone-acetaminophen (Norco) 7.5-325 MG per tablet 30 tablet 0     Sig: Take 1 tablet by mouth Daily As Needed for Moderate Pain .       When do you need the refill by: 08/11/21    What additional details did the patient provide when requesting the medication: PATIENT CALLING STATING SHE IS OUT AND THE MEDICATION WAS FILLED ON 07/13/21    Does the patient have less than a 3 day supply:  [x] Yes  [] No    What is the patient's preferred pharmacy: [address] [phone] [facility] [phone] FX

## 2021-08-11 NOTE — TELEPHONE ENCOUNTER
Rx Refill Note  Requested Prescriptions     Pending Prescriptions Disp Refills   • HYDROcodone-acetaminophen (Norco) 7.5-325 MG per tablet 30 tablet 0     Sig: Take 1 tablet by mouth Daily As Needed for Moderate Pain .      Last office visit with prescribing clinician: 6/3/2021      Next office visit with prescribing clinician: 9/16/2021     Comprehensive Metabolic Panel (11/24/2020 15:10)         Sharron Rowe, RT  08/11/21, 09:02 EDT

## 2021-08-12 DIAGNOSIS — F41.9 ANXIETY: ICD-10-CM

## 2021-08-12 DIAGNOSIS — F51.01 PRIMARY INSOMNIA: ICD-10-CM

## 2021-08-12 RX ORDER — DIAZEPAM 10 MG/1
10 TABLET ORAL NIGHTLY PRN
Qty: 30 TABLET | Refills: 0 | Status: SHIPPED | OUTPATIENT
Start: 2021-08-12 | End: 2021-09-13 | Stop reason: SDUPTHER

## 2021-08-12 RX ORDER — ZOLPIDEM TARTRATE 10 MG/1
10 TABLET ORAL NIGHTLY PRN
Qty: 30 TABLET | Refills: 0 | Status: SHIPPED | OUTPATIENT
Start: 2021-08-12 | End: 2021-09-13 | Stop reason: SDUPTHER

## 2021-08-12 NOTE — TELEPHONE ENCOUNTER
Incoming Refill Request      Medication requested (name and dose): Diazepam 10mg  Zolpidem 10mg    [facility] where [facility] should be sent[facility]    Additional details provided by patient: n/a    Best call back number: [phone]    Does the patient have less than a 3 day supply:  [] Yes  [x] No    Andrzej Islas Rep  08/12/21, 12:05 EDT

## 2021-08-12 NOTE — TELEPHONE ENCOUNTER
Rx Refill Note  Requested Prescriptions     Pending Prescriptions Disp Refills   • diazePAM (VALIUM) 10 MG tablet 30 tablet 0     Sig: Take 1 tablet by mouth At Night As Needed for Anxiety.   • zolpidem (AMBIEN) 10 MG tablet 30 tablet 0     Sig: Take 1 tablet by mouth At Night As Needed for Sleep. for sleep      Last office visit with prescribing clinician: 6/3/2021      Next office visit with prescribing clinician: 9/16/2021     Comprehensive Metabolic Panel (11/24/2020 15:10)         Danay Galarza CMA  08/12/21, 12:10 EDT

## 2021-08-12 NOTE — TELEPHONE ENCOUNTER
Rx Refill Note  Requested Prescriptions     Pending Prescriptions Disp Refills   • diazePAM (VALIUM) 10 MG tablet 30 tablet 0     Sig: Take 1 tablet by mouth At Night As Needed for Anxiety.   • zolpidem (AMBIEN) 10 MG tablet 30 tablet 0     Sig: Take 1 tablet by mouth At Night As Needed for Sleep. for sleep      Last office visit with prescribing clinician: 6/3/2021      Next office visit with prescribing clinician: 9/16/2021     Comprehensive Metabolic Panel (11/24/2020 15:10)         Danay Galarza CMA  08/12/21, 12:09 EDT

## 2021-08-13 DIAGNOSIS — F41.9 ANXIETY: ICD-10-CM

## 2021-08-13 DIAGNOSIS — F51.01 PRIMARY INSOMNIA: ICD-10-CM

## 2021-08-13 RX ORDER — DIAZEPAM 10 MG/1
10 TABLET ORAL NIGHTLY PRN
Qty: 30 TABLET | Refills: 0 | OUTPATIENT
Start: 2021-08-13

## 2021-08-13 RX ORDER — ZOLPIDEM TARTRATE 10 MG/1
10 TABLET ORAL NIGHTLY PRN
Qty: 30 TABLET | Refills: 0 | OUTPATIENT
Start: 2021-08-13

## 2021-08-13 NOTE — TELEPHONE ENCOUNTER
Caller: Karina Saleem    Relationship: Self    Best call back number: [phone]     Medication needed:   Requested Prescriptions     Pending Prescriptions Disp Refills   • diazePAM (VALIUM) 10 MG tablet 30 tablet 0     Sig: Take 1 tablet by mouth At Night As Needed for Anxiety.   • zolpidem (AMBIEN) 10 MG tablet 30 tablet 0     Sig: Take 1 tablet by mouth At Night As Needed for Sleep. for sleep       When do you need the refill by: ASAP    What additional details did the patient provide when requesting the medication:     PATIENT HAS RAN OUT OF THESE, TODAY.    Does the patient have less than a 3 day supply:  [x] Yes  [] No    What is the patient's preferred pharmacy: [address] [facility] [phone] [facility] [phone] FX

## 2021-08-30 ENCOUNTER — TELEPHONE (OUTPATIENT)
Dept: FAMILY MEDICINE CLINIC | Facility: CLINIC | Age: 81
End: 2021-08-30

## 2021-08-30 RX ORDER — PROMETHAZINE HYDROCHLORIDE 25 MG/1
25 TABLET ORAL EVERY 8 HOURS PRN
Qty: 46 TABLET | Refills: 0 | Status: SHIPPED | OUTPATIENT
Start: 2021-08-30 | End: 2021-10-19 | Stop reason: SDUPTHER

## 2021-08-30 RX ORDER — ERGOCALCIFEROL 1.25 MG/1
50000 CAPSULE ORAL WEEKLY
Qty: 12 CAPSULE | Refills: 1 | Status: SHIPPED | OUTPATIENT
Start: 2021-08-30 | End: 2022-04-29 | Stop reason: SDUPTHER

## 2021-08-30 RX ORDER — OMEPRAZOLE 40 MG/1
40 CAPSULE, DELAYED RELEASE ORAL DAILY
Qty: 90 CAPSULE | Refills: 0 | Status: SHIPPED | OUTPATIENT
Start: 2021-08-30 | End: 2021-10-19

## 2021-08-30 NOTE — TELEPHONE ENCOUNTER
Incoming Refill Request      Medication requested (name and dose): Promethazine Hcl 25mg    [facility] where [facility] be sent[facility]    Additional details provided by patient: n/a    Best call back number: [phone]    Does the patient have less than a 3 day supply:  [] Yes  [x] No    Haritha Beasley, RegSched Rep  08/30/21, 13:21 EDT

## 2021-08-30 NOTE — TELEPHONE ENCOUNTER
Incoming Refill Request      Medication requested (name and dose): Magic Butt Cream (do not see on med list)    [facility] where [facility] be sent[facility]    Additional details provided by patient: n/a    Best call back number: [phone]    Does the patient have less than a 3 day supply:  [] Yes  [x] No    Andrzej Islas Rep  08/30/21, 09:03 EDT

## 2021-08-30 NOTE — TELEPHONE ENCOUNTER
Patient called requesting this medication. And has been on it for a while but I think we have had to write it out in the past.

## 2021-09-09 DIAGNOSIS — G14 POST POLIOMYELITIS SYNDROME: ICD-10-CM

## 2021-09-09 NOTE — TELEPHONE ENCOUNTER
Caller: Karina Saleem    Relationship: Self    Best call back number: [phone]    Medication needed:   Requested Prescriptions     Pending Prescriptions Disp Refills   • HYDROcodone-acetaminophen (Norco) 7.5-325 MG per tablet 30 tablet 0     Sig: Take 1 tablet by mouth Daily As Needed for Moderate Pain .       When do you need the refill by: ASAP    What additional details did the patient provide when requesting the medication: THE PATIENT HAS ONE DAY LEFT OF THE MEDICATION     Does the patient have less than a 3 day supply:  [x] Yes  [] No    What is the patient's preferred pharmacy: [address] - [phone] [facility] [phone] FX

## 2021-09-09 NOTE — TELEPHONE ENCOUNTER
Rx Refill Note  Requested Prescriptions     Pending Prescriptions Disp Refills   • HYDROcodone-acetaminophen (Norco) 7.5-325 MG per tablet 30 tablet 0     Sig: Take 1 tablet by mouth Daily As Needed for Moderate Pain .      Last office visit with prescribing clinician: 6/3/2021      Next office visit with prescribing clinician: 9/16/2021     Comprehensive Metabolic Panel (11/24/2020 15:10)         Danay Galarza CMA  09/09/21, 08:47 EDT

## 2021-09-10 RX ORDER — HYDROCODONE BITARTRATE AND ACETAMINOPHEN 7.5; 325 MG/1; MG/1
1 TABLET ORAL DAILY PRN
Qty: 30 TABLET | Refills: 0 | Status: SHIPPED | OUTPATIENT
Start: 2021-09-10 | End: 2021-10-07 | Stop reason: SDUPTHER

## 2021-09-13 DIAGNOSIS — F41.9 ANXIETY: ICD-10-CM

## 2021-09-13 DIAGNOSIS — F51.01 PRIMARY INSOMNIA: ICD-10-CM

## 2021-09-13 RX ORDER — ZOLPIDEM TARTRATE 10 MG/1
10 TABLET ORAL NIGHTLY PRN
Qty: 30 TABLET | Refills: 0 | Status: SHIPPED | OUTPATIENT
Start: 2021-09-13 | End: 2021-10-07 | Stop reason: SDUPTHER

## 2021-09-13 RX ORDER — DIAZEPAM 10 MG/1
10 TABLET ORAL NIGHTLY PRN
Qty: 30 TABLET | Refills: 0 | Status: SHIPPED | OUTPATIENT
Start: 2021-09-13 | End: 2021-10-07 | Stop reason: SDUPTHER

## 2021-09-20 NOTE — TELEPHONE ENCOUNTER
Caller: Karina Saleem    Relationship: Self    Medication needed:   Requested Prescriptions     Pending Prescriptions Disp Refills   • hydrocortisone-zinc oxide-bacitracin-nystatin cream 60 g 3     Sig: Apply 1 application topically to the appropriate area as directed As Needed (apply to butt rash bid prn).     [facility] [place] [address] [phone] [facility] [phone] FX

## 2021-09-20 NOTE — TELEPHONE ENCOUNTER
Rx Refill Note  Requested Prescriptions     Pending Prescriptions Disp Refills   • hydrocortisone-zinc oxide-bacitracin-nystatin cream 60 g 3     Sig: Apply 1 application topically to the appropriate area as directed As Needed (apply to butt rash bid prn).      Last office visit with prescribing clinician: 6/3/2021      Next office visit with prescribing clinician: 9/28/2021     Comprehensive Metabolic Panel (11/24/2020 15:10)  CBC & Differential (11/24/2020 15:10)         Danay Galarza CMA  09/20/21, 16:01 EDT     Looks like the last script did not go through.

## 2021-10-07 ENCOUNTER — TELEPHONE (OUTPATIENT)
Dept: FAMILY MEDICINE CLINIC | Facility: CLINIC | Age: 81
End: 2021-10-07

## 2021-10-07 DIAGNOSIS — G14 POST POLIOMYELITIS SYNDROME: ICD-10-CM

## 2021-10-07 DIAGNOSIS — F41.9 ANXIETY: ICD-10-CM

## 2021-10-07 DIAGNOSIS — F51.01 PRIMARY INSOMNIA: ICD-10-CM

## 2021-10-07 RX ORDER — ZOLPIDEM TARTRATE 10 MG/1
10 TABLET ORAL NIGHTLY PRN
Qty: 30 TABLET | Refills: 0 | Status: SHIPPED | OUTPATIENT
Start: 2021-10-07 | End: 2021-11-10 | Stop reason: SDUPTHER

## 2021-10-07 RX ORDER — HYDROCODONE BITARTRATE AND ACETAMINOPHEN 7.5; 325 MG/1; MG/1
1 TABLET ORAL DAILY PRN
Qty: 30 TABLET | Refills: 0 | Status: SHIPPED | OUTPATIENT
Start: 2021-10-07 | End: 2021-11-08 | Stop reason: SDUPTHER

## 2021-10-07 RX ORDER — DIAZEPAM 10 MG/1
10 TABLET ORAL NIGHTLY PRN
Qty: 30 TABLET | Refills: 0 | Status: SHIPPED | OUTPATIENT
Start: 2021-10-07 | End: 2021-11-10 | Stop reason: SDUPTHER

## 2021-10-07 NOTE — TELEPHONE ENCOUNTER
Caller: Karina Saleem    Relationship: Self    Best call back number: [phone]     What is the best time to reach you: ANY TIME    Who are you requesting to speak with (clinical staff, provider,  specific staff member): CLINICAL    What was the call regarding: PATIENT'S APPOINTMENT FOR HER MED REFILL WAS RESCHEDULED TO 10/19/2021 BUT SHE WILL RUN OUT OF HER MEDICATIONS THIS WEEKEND. PATIENT IS UNSURE OF WHAT TO DO.     THE MEDICATIONS ARE:  diazePAM (VALIUM) 10 MG tablet  HYDROcodone-acetaminophen (Norco) 7.5-325 MG per tablet  zolpidem (AMBIEN) 10 MG tablet

## 2021-10-19 ENCOUNTER — OFFICE VISIT (OUTPATIENT)
Dept: FAMILY MEDICINE CLINIC | Facility: CLINIC | Age: 81
End: 2021-10-19

## 2021-10-19 VITALS
RESPIRATION RATE: 14 BRPM | TEMPERATURE: 97.1 F | HEART RATE: 84 BPM | SYSTOLIC BLOOD PRESSURE: 152 MMHG | HEIGHT: 62 IN | OXYGEN SATURATION: 98 % | DIASTOLIC BLOOD PRESSURE: 81 MMHG

## 2021-10-19 DIAGNOSIS — E55.9 VITAMIN D DEFICIENCY: ICD-10-CM

## 2021-10-19 DIAGNOSIS — E03.9 ACQUIRED HYPOTHYROIDISM: ICD-10-CM

## 2021-10-19 DIAGNOSIS — I10 ESSENTIAL (PRIMARY) HYPERTENSION: ICD-10-CM

## 2021-10-19 DIAGNOSIS — Z23 NEED FOR INFLUENZA VACCINATION: ICD-10-CM

## 2021-10-19 DIAGNOSIS — G14 POST POLIOMYELITIS SYNDROME: Chronic | ICD-10-CM

## 2021-10-19 DIAGNOSIS — M41.9 KYPHOSCOLIOSIS: Chronic | ICD-10-CM

## 2021-10-19 DIAGNOSIS — H10.31 ACUTE BACTERIAL CONJUNCTIVITIS OF RIGHT EYE: Primary | ICD-10-CM

## 2021-10-19 DIAGNOSIS — S50.312A ABRASION OF LEFT ELBOW, INITIAL ENCOUNTER: ICD-10-CM

## 2021-10-19 PROCEDURE — G0008 ADMIN INFLUENZA VIRUS VAC: HCPCS | Performed by: FAMILY MEDICINE

## 2021-10-19 PROCEDURE — 99214 OFFICE O/P EST MOD 30 MIN: CPT | Performed by: FAMILY MEDICINE

## 2021-10-19 PROCEDURE — 90662 IIV NO PRSV INCREASED AG IM: CPT | Performed by: FAMILY MEDICINE

## 2021-10-19 RX ORDER — PROMETHAZINE HYDROCHLORIDE 25 MG/1
25 TABLET ORAL EVERY 8 HOURS PRN
Qty: 46 TABLET | Refills: 0 | Status: SHIPPED | OUTPATIENT
Start: 2021-10-19 | End: 2021-12-08 | Stop reason: SDUPTHER

## 2021-10-19 RX ORDER — GABAPENTIN 100 MG/1
100 CAPSULE ORAL 3 TIMES DAILY
Qty: 450 CAPSULE | Refills: 2
Start: 2021-10-19 | End: 2022-01-10 | Stop reason: SDUPTHER

## 2021-10-19 RX ORDER — POLYMYXIN B SULFATE AND TRIMETHOPRIM 1; 10000 MG/ML; [USP'U]/ML
1 SOLUTION OPHTHALMIC EVERY 6 HOURS
Qty: 10 ML | Refills: 0 | Status: SHIPPED | OUTPATIENT
Start: 2021-10-19 | End: 2022-04-29

## 2021-10-26 ENCOUNTER — TELEPHONE (OUTPATIENT)
Dept: FAMILY MEDICINE CLINIC | Facility: CLINIC | Age: 81
End: 2021-10-26

## 2021-10-26 NOTE — TELEPHONE ENCOUNTER
Caller: Leo Saleem    Relationship: Self    Best call back number: [phone] (H)    What is the best time to reach you: ANYTIME    Who are you requesting to speak with (clinical staff, provider,  specific staff member): CLINICAL STAFF    Do you know the name of the person who called: LEO SALEEM    What was the call regarding: PATIENT STATED SHE HAD A SORE ON HER LEFT ELBOW WHEN SHE WAS IN THE OFFICE LAST WEEK AND DR CARRERO PUT A BANDAGE WITH SOME MEDICINE ON IT AND GAVE HER ONE EXTRA BANDAGE WITH MEDICINE ON IT, PATIENT STATES THE DRUG STORE HAS THE SAME SIZE BANDAGES BUT THEY DO NOT HAVE MEDICINE ON THEM AND PATIENT WOULD LIKE TO KNOW WHAT THE MEDICINE IS THAT DR CARRERO PUT ON THE BANDAGES IS BECAUSE HER ELBOW IS NOT QUITE HEALED UP YET, PLEASE ADVISE ASAP    Do you require a callback: YES, ASAP

## 2021-10-27 ENCOUNTER — TELEPHONE (OUTPATIENT)
Dept: FAMILY MEDICINE CLINIC | Facility: CLINIC | Age: 81
End: 2021-10-27

## 2021-10-27 NOTE — TELEPHONE ENCOUNTER
Caller: Karina Saleem    Relationship: Self    Best call back number: [phone]    What is the best time to reach you: ANY    Who are you requesting to speak with (clinical staff, provider,  specific staff member): SAPPHIRE CARRERO    What was the call regarding: PATIENT IS REQUESTING A CALL BACK FROM DR CARRERO. PATIENT STATES IS VERY IMPORTANT. A LADY FROM HER RESIDENCE IS GOING TO FAX DOCUMENTATION OVER REGARDING PATIENTS LIVING ARRANGEMENTS. PLEASE CALL PATIENT BACK.     CALL [facility] [phone] REGARDING PATIENT'S HOUSING. DR CARRERO NEEDS TO SIGN THE DOCUMENT SO PATIENT DOESN'T GET EVICTED.     Do you require a callback:YES

## 2021-11-08 DIAGNOSIS — G14 POST POLIOMYELITIS SYNDROME: ICD-10-CM

## 2021-11-08 RX ORDER — HYDROCODONE BITARTRATE AND ACETAMINOPHEN 7.5; 325 MG/1; MG/1
1 TABLET ORAL DAILY PRN
Qty: 30 TABLET | Refills: 0 | Status: SHIPPED | OUTPATIENT
Start: 2021-11-08 | End: 2021-12-08 | Stop reason: SDUPTHER

## 2021-11-08 NOTE — TELEPHONE ENCOUNTER
Caller: Karina Saleem    Relationship: Self      Requested Prescriptions:   Requested Prescriptions     Pending Prescriptions Disp Refills   • HYDROcodone-acetaminophen (Norco) 7.5-325 MG per tablet 30 tablet 0     Sig: Take 1 tablet by mouth Daily As Needed for Moderate Pain .        Pharmacy where request should be sent: [facility], [place] - [address] - [phone]  - [phone]   [phone]    Additional details provided by patient: PATIENT STATES SHE IS OUT OF THE MEDICATION.    Best call back number: [phone]    Does the patient have less than a 3 day supply:  [x] Yes  [] No    Andrzej Montelongo Rep   11/08/21 09:27 EST

## 2021-11-08 NOTE — TELEPHONE ENCOUNTER
Rx Refill Note  Requested Prescriptions     Pending Prescriptions Disp Refills   • HYDROcodone-acetaminophen (Norco) 7.5-325 MG per tablet 30 tablet 0     Sig: Take 1 tablet by mouth Daily As Needed for Moderate Pain .      Last office visit with prescribing clinician: 10/19/2021      Next office visit with prescribing clinician: Visit date not found     TSH (06/03/2021 16:03)  T4, Free (06/03/2021 16:03)  Vitamin D 25 Hydroxy (06/03/2021 16:03)  CBC & Differential (11/24/2020 15:10)  Comprehensive Metabolic Panel (11/24/2020 15:10)         Danay Galarza, MIRNA  11/08/21, 09:30 EST

## 2021-11-10 ENCOUNTER — TELEPHONE (OUTPATIENT)
Dept: FAMILY MEDICINE CLINIC | Facility: CLINIC | Age: 81
End: 2021-11-10

## 2021-11-10 DIAGNOSIS — F51.01 PRIMARY INSOMNIA: ICD-10-CM

## 2021-11-10 DIAGNOSIS — F41.9 ANXIETY: ICD-10-CM

## 2021-11-10 RX ORDER — ZOLPIDEM TARTRATE 10 MG/1
10 TABLET ORAL NIGHTLY PRN
Qty: 30 TABLET | Refills: 0 | Status: CANCELLED | OUTPATIENT
Start: 2021-11-10

## 2021-11-10 RX ORDER — DIAZEPAM 10 MG/1
10 TABLET ORAL NIGHTLY PRN
Qty: 30 TABLET | Refills: 0 | Status: CANCELLED | OUTPATIENT
Start: 2021-11-10

## 2021-11-10 RX ORDER — ZOLPIDEM TARTRATE 10 MG/1
10 TABLET ORAL NIGHTLY PRN
Qty: 30 TABLET | Refills: 0 | Status: SHIPPED | OUTPATIENT
Start: 2021-11-10 | End: 2021-12-08 | Stop reason: SDUPTHER

## 2021-11-10 RX ORDER — DIAZEPAM 10 MG/1
10 TABLET ORAL NIGHTLY PRN
Qty: 30 TABLET | Refills: 0 | Status: SHIPPED | OUTPATIENT
Start: 2021-11-10 | End: 2021-12-08 | Stop reason: SDUPTHER

## 2021-11-10 NOTE — TELEPHONE ENCOUNTER
Rx Refill Note  Requested Prescriptions     Pending Prescriptions Disp Refills   • zolpidem (AMBIEN) 10 MG tablet 30 tablet 0     Sig: Take 1 tablet by mouth At Night As Needed for Sleep. for sleep   • diazePAM (VALIUM) 10 MG tablet 30 tablet 0     Sig: Take 1 tablet by mouth At Night As Needed for Anxiety.      Last office visit with prescribing clinician: 10/19/2021      Next office visit with prescribing clinician: Visit date not found     CBC & Differential (11/24/2020 15:10)  Comprehensive Metabolic Panel (11/24/2020 15:10)         Danay Galarza CMA  11/10/21, 10:20 EST

## 2021-11-10 NOTE — TELEPHONE ENCOUNTER
Caller: TioNoelle andraderaine    Relationship: Self    Best call back number: [phone]    Requested Prescriptions:   Requested Prescriptions     Pending Prescriptions Disp Refills   • zolpidem (AMBIEN) 10 MG tablet 30 tablet 0     Sig: Take 1 tablet by mouth At Night As Needed for Sleep. for sleep   • diazePAM (VALIUM) 10 MG tablet 30 tablet 0     Sig: Take 1 tablet by mouth At Night As Needed for Anxiety.        Pharmacy where request should be sent: [address] [phone] [facility] [phone]     Additional details provided by patient: OUT OF MEDICATION   Does the patient have less than a 3 day supply:  [x] Yes  [] No    Andrzej De Paz Rep   11/10/21 08:10 EST

## 2021-11-15 DIAGNOSIS — G14 POST POLIOMYELITIS SYNDROME: ICD-10-CM

## 2021-11-15 NOTE — TELEPHONE ENCOUNTER
Caller: Karina Saleem    Relationship: Self    Best call back number: [phone]     Requested Prescriptions:       fentaNYL (DURAGESIC) 100 MCG/HR patch         Pharmacy where request should be sent:    [facility] [place], [place] - [address] [place] [address] Ave - [phone]  - [phone]  [phone]        Does the patient have less than a 3 day supply:  [x] Yes  [] No    Mirella Buck, RegSched Rep   11/15/21 09:59 EST         PLEASE ADVISE.

## 2021-11-15 NOTE — TELEPHONE ENCOUNTER
Rx Refill Note  Requested Prescriptions     Pending Prescriptions Disp Refills   • fentaNYL (DURAGESIC) 100 MCG/HR patch 10 patch 0     Sig: Place 1 patch on the skin as directed by provider Every 72 (Seventy-Two) Hours.      Last office visit with prescribing clinician: 10/19/2021      Next office visit with prescribing clinician: Visit date not found     Comprehensive Metabolic Panel (11/24/2020 15:10)  CBC & Differential (11/24/2020 15:10)         Danay Galarza CMA  11/15/21, 12:03 EST

## 2021-11-16 RX ORDER — FENTANYL 100 UG/H
1 PATCH TRANSDERMAL
Qty: 10 PATCH | Refills: 0 | Status: SHIPPED | OUTPATIENT
Start: 2021-11-16 | End: 2021-12-20 | Stop reason: SDUPTHER

## 2021-12-03 RX ORDER — LEVOTHYROXINE SODIUM 0.2 MG/1
200 TABLET ORAL DAILY
Qty: 30 TABLET | Refills: 0 | Status: SHIPPED | OUTPATIENT
Start: 2021-12-03 | End: 2022-01-28

## 2021-12-03 NOTE — TELEPHONE ENCOUNTER
OVERDUE FOR F/U LABS AT HOSPITAL.............              Rx Refill Note  Requested Prescriptions     Pending Prescriptions Disp Refills   • levothyroxine (SYNTHROID, LEVOTHROID) 200 MCG tablet 90 tablet 0     Sig: Take 1 tablet by mouth Daily.      Last office visit with prescribing clinician: 10/19/2021      Next office visit with prescribing clinician: Visit date not found     TSH (06/03/2021 16:03)         Sharron Rowe, RT  12/03/21, 16:37 EST

## 2021-12-03 NOTE — TELEPHONE ENCOUNTER
Caller: Karina Saleem    Relationship: Self    Best call back number: [phone]    Requested Prescriptions:   Requested Prescriptions     Pending Prescriptions Disp Refills   • levothyroxine (SYNTHROID, LEVOTHROID) 200 MCG tablet 90 tablet 0     Sig: Take 1 tablet by mouth Daily.        Pharmacy where request should be sent: [place], [place] - [address] [place] [address] - [phone]  - [phone] FX     Additional details provided by patient: PATIENT COMPLETLEY OUT OF MEDICATION. PHARMACY STATES THEY HAVE TRIED TO REQUEST MULTIPLE TIMES WITH NO RESPONSE    Does the patient have less than a 3 day supply:  [x] Yes  [] No    Andrzej Medina Rep   12/03/21 15:02 EST

## 2021-12-06 RX ORDER — LEVOTHYROXINE SODIUM 0.2 MG/1
200 TABLET ORAL DAILY
Qty: 30 TABLET | Refills: 0 | Status: CANCELLED | OUTPATIENT
Start: 2021-12-06

## 2021-12-08 DIAGNOSIS — G14 POST POLIOMYELITIS SYNDROME: ICD-10-CM

## 2021-12-08 DIAGNOSIS — F41.9 ANXIETY: ICD-10-CM

## 2021-12-08 DIAGNOSIS — F51.01 PRIMARY INSOMNIA: ICD-10-CM

## 2021-12-08 RX ORDER — ZOLPIDEM TARTRATE 10 MG/1
10 TABLET ORAL NIGHTLY PRN
Qty: 30 TABLET | Refills: 0 | Status: SHIPPED | OUTPATIENT
Start: 2021-12-08 | End: 2022-01-04 | Stop reason: SDUPTHER

## 2021-12-08 RX ORDER — HYDROCODONE BITARTRATE AND ACETAMINOPHEN 7.5; 325 MG/1; MG/1
1 TABLET ORAL DAILY PRN
Qty: 30 TABLET | Refills: 0 | Status: SHIPPED | OUTPATIENT
Start: 2021-12-08 | End: 2022-01-10 | Stop reason: SDUPTHER

## 2021-12-08 RX ORDER — PROMETHAZINE HYDROCHLORIDE 25 MG/1
25 TABLET ORAL EVERY 8 HOURS PRN
Qty: 46 TABLET | Refills: 0 | Status: SHIPPED | OUTPATIENT
Start: 2021-12-08 | End: 2022-01-17 | Stop reason: SDUPTHER

## 2021-12-08 RX ORDER — DIAZEPAM 10 MG/1
10 TABLET ORAL NIGHTLY PRN
Qty: 30 TABLET | Refills: 0 | Status: SHIPPED | OUTPATIENT
Start: 2021-12-08 | End: 2022-01-04 | Stop reason: SDUPTHER

## 2021-12-08 NOTE — TELEPHONE ENCOUNTER
Caller: Maureen Saleemine    Relationship: Self    Best call back number: [phone]    Requested Prescriptions:   Requested Prescriptions     Pending Prescriptions Disp Refills   • promethazine (PHENERGAN) 25 MG tablet 46 tablet 0     Sig: Take 1 tablet by mouth Every 8 (Eight) Hours As Needed for Nausea or Vomiting.   • diazePAM (VALIUM) 10 MG tablet 30 tablet 0     Sig: Take 1 tablet by mouth At Night As Needed for Anxiety.   • zolpidem (AMBIEN) 10 MG tablet 30 tablet 0     Sig: Take 1 tablet by mouth At Night As Needed for Sleep. for sleep   • HYDROcodone-acetaminophen (Norco) 7.5-325 MG per tablet 30 tablet 0     Sig: Take 1 tablet by mouth Daily As Needed for Moderate Pain .        Pharmacy where request should be sent: [address] - [phone]  - [phone] FX     Additional details provided by patient: PATIENT HAS 2 DAYS LEFT    Does the patient have less than a 3 day supply:  [x] Yes  [] No    Andrzej Bishop Rep   12/08/21 08:55 EST

## 2021-12-08 NOTE — TELEPHONE ENCOUNTER
Rx Refill Note  Requested Prescriptions     Pending Prescriptions Disp Refills   • promethazine (PHENERGAN) 25 MG tablet 46 tablet 0     Sig: Take 1 tablet by mouth Every 8 (Eight) Hours As Needed for Nausea or Vomiting.   • diazePAM (VALIUM) 10 MG tablet 30 tablet 0     Sig: Take 1 tablet by mouth At Night As Needed for Anxiety.   • zolpidem (AMBIEN) 10 MG tablet 30 tablet 0     Sig: Take 1 tablet by mouth At Night As Needed for Sleep. for sleep   • HYDROcodone-acetaminophen (Norco) 7.5-325 MG per tablet 30 tablet 0     Sig: Take 1 tablet by mouth Daily As Needed for Moderate Pain .      Last office visit with prescribing clinician: 10/19/2021      Next office visit with prescribing clinician: Visit date not found     T4, Free (06/03/2021 16:03)         Sharron Rowe, RT  12/08/21, 08:59 EST

## 2021-12-13 NOTE — TELEPHONE ENCOUNTER
PATIENT CALLED FOR MEDICATION REFILL OF   MARY KATE'S BUTT CREAM( NOT LISTED ON MED LIST UNLESS IT GOES BY SOMETHING ELSE)    SHE IS OUT OF MEDICATION    [facility] - [place], [place] - [address] - [phone]  - [phone]   [phone]    CALL BACK NUMBER [phone]

## 2021-12-13 NOTE — TELEPHONE ENCOUNTER
Incoming Refill Request      Medication requested (name and dose): Magic Butt Cream    [facility]    Additional details provided by patient: n/a    Best call back number:     Does the patient have less than a 3 day supply:  [] Yes  [x] No    Andrzej Islas Rep  12/13/21, 09:24 EST

## 2021-12-20 DIAGNOSIS — G14 POST POLIOMYELITIS SYNDROME: ICD-10-CM

## 2021-12-20 RX ORDER — FENTANYL 100 UG/H
1 PATCH TRANSDERMAL
Qty: 10 PATCH | Refills: 0 | Status: SHIPPED | OUTPATIENT
Start: 2021-12-20 | End: 2022-03-01 | Stop reason: SDUPTHER

## 2021-12-20 NOTE — TELEPHONE ENCOUNTER
Caller: Karina Saleem    Relationship: Self    Best call back number: [phone]    Requested Prescriptions:   Requested Prescriptions     Pending Prescriptions Disp Refills   • fentaNYL (DURAGESIC) 100 MCG/HR patch 10 patch 0     Sig: Place 1 patch on the skin as directed by provider Every 72 (Seventy-Two) Hours.        Pharmacy where request should be sent: [address] [phone]  - [phone] FX     Additional details provided by patient: PATIENT HAS 2 DAYS LEFT OF MEDICATION.     Does the patient have less than a 3 day supply:  [x] Yes  [] No    Andrzej Acosta Rep   12/20/21 12:45 EST

## 2021-12-20 NOTE — TELEPHONE ENCOUNTER
Rx Refill Note  Requested Prescriptions     Pending Prescriptions Disp Refills   • fentaNYL (DURAGESIC) 100 MCG/HR patch 10 patch 0     Sig: Place 1 patch on the skin as directed by provider Every 72 (Seventy-Two) Hours.      Last office visit with prescribing clinician: 10/19/2021      Next office visit with prescribing clinician: Visit date not found     Office Visit with Aditi Agarwal DO (10/19/2021)  Vitamin D 25 Hydroxy (06/03/2021 16:03)  TSH (06/03/2021 16:03)  T4, Free (06/03/2021 16:03)         Antony Rivero  12/20/21, 12:50 EST     INSPECT RAN

## 2022-01-04 DIAGNOSIS — F41.9 ANXIETY: ICD-10-CM

## 2022-01-04 DIAGNOSIS — F51.01 PRIMARY INSOMNIA: ICD-10-CM

## 2022-01-04 RX ORDER — ZOLPIDEM TARTRATE 10 MG/1
10 TABLET ORAL NIGHTLY PRN
Qty: 30 TABLET | Refills: 0 | Status: SHIPPED | OUTPATIENT
Start: 2022-01-04 | End: 2022-01-31 | Stop reason: SDUPTHER

## 2022-01-04 RX ORDER — DIAZEPAM 10 MG/1
10 TABLET ORAL NIGHTLY PRN
Qty: 30 TABLET | Refills: 0 | Status: SHIPPED | OUTPATIENT
Start: 2022-01-04 | End: 2022-01-31 | Stop reason: SDUPTHER

## 2022-01-04 NOTE — TELEPHONE ENCOUNTER
Rx Refill Note  Requested Prescriptions     Pending Prescriptions Disp Refills   • diazePAM (VALIUM) 10 MG tablet 30 tablet 0     Sig: Take 1 tablet by mouth At Night As Needed for Anxiety.   • zolpidem (AMBIEN) 10 MG tablet 30 tablet 0     Sig: Take 1 tablet by mouth At Night As Needed for Sleep. for sleep   • hydrocortisone-zinc oxide-bacitracin-nystatin cream 60 g 3     Sig: Apply 1 application topically to the appropriate area as directed As Needed (apply to butt rash bid prn).      Last office visit with prescribing clinician: 10/19/2021      Next office visit with prescribing clinician: Visit date not found     Vitamin D 25 Hydroxy (06/03/2021 16:03)         Sharron Rowe, RT  01/04/22, 15:06 EST

## 2022-01-10 ENCOUNTER — TELEPHONE (OUTPATIENT)
Dept: FAMILY MEDICINE CLINIC | Facility: CLINIC | Age: 82
End: 2022-01-10

## 2022-01-10 DIAGNOSIS — G14 POST POLIOMYELITIS SYNDROME: ICD-10-CM

## 2022-01-10 RX ORDER — GABAPENTIN 100 MG/1
100 CAPSULE ORAL 3 TIMES DAILY
Qty: 450 CAPSULE | Refills: 2 | Status: CANCELLED
Start: 2022-01-10

## 2022-01-10 RX ORDER — HYDROCODONE BITARTRATE AND ACETAMINOPHEN 7.5; 325 MG/1; MG/1
1 TABLET ORAL DAILY PRN
Qty: 30 TABLET | Refills: 0 | Status: SHIPPED | OUTPATIENT
Start: 2022-01-10 | End: 2022-02-11 | Stop reason: SDUPTHER

## 2022-01-10 RX ORDER — HYDROCODONE BITARTRATE AND ACETAMINOPHEN 7.5; 325 MG/1; MG/1
1 TABLET ORAL DAILY PRN
Qty: 30 TABLET | Refills: 0 | Status: CANCELLED | OUTPATIENT
Start: 2022-01-10

## 2022-01-10 RX ORDER — GABAPENTIN 100 MG/1
100 CAPSULE ORAL 3 TIMES DAILY
Qty: 450 CAPSULE | Refills: 2
Start: 2022-01-10 | End: 2022-01-24 | Stop reason: SDUPTHER

## 2022-01-10 NOTE — TELEPHONE ENCOUNTER
Rx Refill Note  Requested Prescriptions     Pending Prescriptions Disp Refills   • gabapentin (NEURONTIN) 100 MG capsule 450 capsule 2     Sig: Take 1 capsule by mouth 3 (Three) Times a Day.   • HYDROcodone-acetaminophen (Norco) 7.5-325 MG per tablet 30 tablet 0     Sig: Take 1 tablet by mouth Daily As Needed for Moderate Pain .      Last office visit with prescribing clinician: 10/19/2021      Next office visit with prescribing clinician: Visit date not found     Comprehensive Metabolic Panel (11/24/2020 15:10)         Sharron Rowe, RT  01/10/22, 12:41 EST

## 2022-01-10 NOTE — TELEPHONE ENCOUNTER
Caller: Karina Saleem    Relationship: Self    Best call back number: [phone]    Requested Prescriptions:   Requested Prescriptions     Pending Prescriptions Disp Refills   • gabapentin (NEURONTIN) 100 MG capsule 450 capsule 2     Sig: Take 1 capsule by mouth 3 (Three) Times a Day.   • HYDROcodone-acetaminophen (Norco) 7.5-325 MG per tablet 30 tablet 0     Sig: Take 1 tablet by mouth Daily As Needed for Moderate Pain .        Pharmacy where request should be sent: [address] [phone]  - [phone] FX     Additional details provided by patient: APPROX THREE TABLETS LEFT    Does the patient have less than a 3 day supply:  [x] Yes  [] No    Andrzej Casey Rep   01/10/22 11:23 EST

## 2022-01-17 RX ORDER — PROMETHAZINE HYDROCHLORIDE 25 MG/1
25 TABLET ORAL EVERY 8 HOURS PRN
Qty: 46 TABLET | Refills: 0 | Status: SHIPPED | OUTPATIENT
Start: 2022-01-17 | End: 2022-02-21 | Stop reason: SDUPTHER

## 2022-01-17 NOTE — TELEPHONE ENCOUNTER
Rx Refill Note  Requested Prescriptions     Pending Prescriptions Disp Refills   • promethazine (PHENERGAN) 25 MG tablet 46 tablet 0     Sig: Take 1 tablet by mouth Every 8 (Eight) Hours As Needed for Nausea or Vomiting.      Last office visit with prescribing clinician: 10/19/2021      Next office visit with prescribing clinician: Visit date not found     CBC & Differential (11/24/2020 15:10)  Comprehensive Metabolic Panel (11/24/2020 15:10)         Danay Galarza CMA  01/17/22, 11:19 EST

## 2022-01-17 NOTE — TELEPHONE ENCOUNTER
Caller: Karina Saleem    Relationship: Self    Best call back number: [phone]     Requested Prescriptions:   Requested Prescriptions     Pending Prescriptions Disp Refills   • promethazine (PHENERGAN) 25 MG tablet 46 tablet 0     Sig: Take 1 tablet by mouth Every 8 (Eight) Hours As Needed for Nausea or Vomiting.        Pharmacy where request should be sent: [address] [phone]  - [phone] FX     Additional details provided by patient: PATIENT STATES SHE MAY HAVE TWO OR THREE LEFT     Does the patient have less than a 3 day supply:  [] Yes  [x] No    Andrzej Villafuerte Rep   01/17/22 09:33 EST

## 2022-01-24 DIAGNOSIS — G14 POST POLIOMYELITIS SYNDROME: ICD-10-CM

## 2022-01-24 RX ORDER — GABAPENTIN 100 MG/1
100 CAPSULE ORAL 3 TIMES DAILY
Qty: 450 CAPSULE | Refills: 2 | Status: SHIPPED | OUTPATIENT
Start: 2022-01-24 | End: 2023-02-13 | Stop reason: SDUPTHER

## 2022-01-24 RX ORDER — GABAPENTIN 100 MG/1
100 CAPSULE ORAL 3 TIMES DAILY
Qty: 450 CAPSULE | Refills: 2 | Status: CANCELLED
Start: 2022-01-24

## 2022-01-28 RX ORDER — LEVOTHYROXINE SODIUM 0.2 MG/1
200 TABLET ORAL DAILY
Qty: 30 TABLET | Refills: 0 | OUTPATIENT
Start: 2022-01-28

## 2022-01-28 RX ORDER — LEVOTHYROXINE SODIUM 0.2 MG/1
TABLET ORAL
Qty: 30 TABLET | Refills: 0 | Status: SHIPPED | OUTPATIENT
Start: 2022-01-28 | End: 2022-04-27 | Stop reason: SDUPTHER

## 2022-01-28 NOTE — TELEPHONE ENCOUNTER
"Called patient and she states she has not had done because she has not been out of her house.  I informed patient that Dr. Agarwal needing those results and patient stated that she will \"do the best she can and try to get them done\".  "

## 2022-01-28 NOTE — TELEPHONE ENCOUNTER
Rx Refill Note  Requested Prescriptions     Pending Prescriptions Disp Refills   • levothyroxine (SYNTHROID, LEVOTHROID) 200 MCG tablet [Pharmacy Med Name: levothyroxine 200 mcg tablet] 30 tablet 0     Sig: TAKE ONE TABLET BY MOUTH DAILY      Last office visit with prescribing clinician: 10/19/2021      Next office visit with prescribing clinician: Visit date not found     CBC & Differential (11/24/2020 15:10)  Comprehensive Metabolic Panel (11/24/2020 15:10)         Danay Galarza CMA  01/28/22, 12:08 EST

## 2022-01-31 DIAGNOSIS — F51.01 PRIMARY INSOMNIA: ICD-10-CM

## 2022-01-31 DIAGNOSIS — F41.9 ANXIETY: ICD-10-CM

## 2022-02-02 RX ORDER — DIAZEPAM 10 MG/1
10 TABLET ORAL NIGHTLY PRN
Qty: 30 TABLET | Refills: 0 | Status: SHIPPED | OUTPATIENT
Start: 2022-02-02 | End: 2022-03-01 | Stop reason: SDUPTHER

## 2022-02-02 RX ORDER — ZOLPIDEM TARTRATE 10 MG/1
10 TABLET ORAL NIGHTLY PRN
Qty: 30 TABLET | Refills: 0 | Status: SHIPPED | OUTPATIENT
Start: 2022-02-02 | End: 2022-03-01 | Stop reason: SDUPTHER
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
## 2022-02-03 DIAGNOSIS — F41.9 ANXIETY: ICD-10-CM

## 2022-02-03 DIAGNOSIS — F51.01 PRIMARY INSOMNIA: ICD-10-CM

## 2022-02-03 RX ORDER — ZOLPIDEM TARTRATE 10 MG/1
10 TABLET ORAL NIGHTLY PRN
Qty: 30 TABLET | Refills: 0 | OUTPATIENT
Start: 2022-02-03

## 2022-02-03 RX ORDER — DIAZEPAM 10 MG/1
10 TABLET ORAL NIGHTLY PRN
Qty: 30 TABLET | Refills: 0 | OUTPATIENT
Start: 2022-02-03

## 2022-02-03 NOTE — TELEPHONE ENCOUNTER
Rx Refill Note  Requested Prescriptions     Refused Prescriptions Disp Refills   • diazePAM (VALIUM) 10 MG tablet 30 tablet 0     Sig: Take 1 tablet by mouth At Night As Needed for Anxiety.     Refused By: CANDY LEAHY     Reason for Refusal: Duplicate renewal request   • zolpidem (AMBIEN) 10 MG tablet 30 tablet 0     Sig: Take 1 tablet by mouth At Night As Needed for Sleep. for sleep     Refused By: CANDY LEAHY     Reason for Refusal: Duplicate renewal request      Last office visit with prescribing clinician: 10/19/2021      Next office visit with prescribing clinician: Visit date not found            Danay Galarza CMA  02/03/22, 08:28 EST   Duplicate request,this was sent in yesterday.

## 2022-02-03 NOTE — TELEPHONE ENCOUNTER
Caller: Karina Saleem    Relationship: Self    Best call back number:     Requested Prescriptions:   Requested Prescriptions     Pending Prescriptions Disp Refills   • diazePAM (VALIUM) 10 MG tablet 30 tablet 0     Sig: Take 1 tablet by mouth At Night As Needed for Anxiety.   • zolpidem (AMBIEN) 10 MG tablet 30 tablet 0     Sig: Take 1 tablet by mouth At Night As Needed for Sleep. for sleep        [facility] where request should be sent:  [facility]  [address] [place] [address]  [phone]    Additional details provided by patient:     Does the patient have less than a 3 day supply:  [x] Yes  [] No    Andrzej Carter Rep   02/03/22 08:04 EST

## 2022-02-09 ENCOUNTER — TELEPHONE (OUTPATIENT)
Dept: FAMILY MEDICINE CLINIC | Facility: CLINIC | Age: 82
End: 2022-02-09

## 2022-02-09 RX ORDER — TRIAMCINOLONE ACETONIDE 1 MG/G
CREAM TOPICAL
Qty: 80 G | Refills: 0 | Status: SHIPPED | OUTPATIENT
Start: 2022-02-09 | End: 2022-08-22

## 2022-02-09 NOTE — TELEPHONE ENCOUNTER
Incoming Refill Request      Medication requested (name and dose): Triamcinolone 0.1% cream (do not see on list)    [facility]arm[facility] where request should be sent[facility]    Additional details provided by patient: n/a    Best call back number:     Does the patient have less than a 3 day supply:  [] Yes  [x] No    Haritha Beasley, RegSched Rep  02/09/22, 14:41 EST

## 2022-02-11 DIAGNOSIS — G14 POST POLIOMYELITIS SYNDROME: ICD-10-CM

## 2022-02-11 RX ORDER — HYDROCODONE BITARTRATE AND ACETAMINOPHEN 7.5; 325 MG/1; MG/1
1 TABLET ORAL DAILY PRN
Qty: 30 TABLET | Refills: 0 | Status: SHIPPED | OUTPATIENT
Start: 2022-02-11 | End: 2022-03-09 | Stop reason: SDUPTHER

## 2022-02-11 NOTE — TELEPHONE ENCOUNTER
Caller: Karina Saleem    Relationship: Self    Best call back number: [phone]     Requested Prescriptions:   Requested Prescriptions     Pending Prescriptions Disp Refills   • HYDROcodone-acetaminophen (Norco) 7.5-325 MG per tablet 30 tablet 0     Sig: Take 1 tablet by mouth Daily As Needed for Moderate Pain .        Pharmacy where request should be sent: [address] [phone] [facility] [phone] FX     Does the patient have less than a 3 day supply:  [x] Yes  [] No    Andrzej Crawford Rep   02/11/22 12:23 EST

## 2022-02-11 NOTE — TELEPHONE ENCOUNTER
Rx Refill Note  Requested Prescriptions     Pending Prescriptions Disp Refills   • HYDROcodone-acetaminophen (Norco) 7.5-325 MG per tablet 30 tablet 0     Sig: Take 1 tablet by mouth Daily As Needed for Moderate Pain .      Last office visit with prescribing clinician: 10/19/2021      Next office visit with prescribing clinician: Visit date not found     Comprehensive Metabolic Panel (11/24/2020 15:10)         Sharron Rowe, RT  02/11/22, 12:25 EST

## 2022-02-21 ENCOUNTER — TELEPHONE (OUTPATIENT)
Dept: FAMILY MEDICINE CLINIC | Facility: CLINIC | Age: 82
End: 2022-02-21

## 2022-02-21 RX ORDER — PROMETHAZINE HYDROCHLORIDE 25 MG/1
25 TABLET ORAL EVERY 8 HOURS PRN
Qty: 46 TABLET | Refills: 0 | Status: SHIPPED | OUTPATIENT
Start: 2022-02-21 | End: 2022-04-11 | Stop reason: SDUPTHER

## 2022-02-21 RX ORDER — PROMETHAZINE HYDROCHLORIDE 25 MG/1
25 TABLET ORAL EVERY 8 HOURS PRN
Qty: 46 TABLET | Refills: 0 | Status: CANCELLED | OUTPATIENT
Start: 2022-02-21

## 2022-02-21 NOTE — TELEPHONE ENCOUNTER
Caller: Karina Saleem    Relationship: Self    Best call back number: [phone]    Requested Prescriptions:   Requested Prescriptions     Pending Prescriptions Disp Refills   • promethazine (PHENERGAN) 25 MG tablet 46 tablet 0     Sig: Take 1 tablet by mouth Every 8 (Eight) Hours As Needed for Nausea or Vomiting.        Pharmacy where request should be sent: [address] [phone]  - [phone] FX     Additional details provided by patient: PATIENT ONLY HAS 2 TABLETS LEFT  Does the patient have less than a 3 day supply:  [x] Yes  [] No    Andrzej Main Rep   02/21/22 13:21 EST

## 2022-02-21 NOTE — TELEPHONE ENCOUNTER
Rx Refill Note  Requested Prescriptions     Pending Prescriptions Disp Refills   • promethazine (PHENERGAN) 25 MG tablet 46 tablet 0     Sig: Take 1 tablet by mouth Every 8 (Eight) Hours As Needed for Nausea or Vomiting.      Last office visit with prescribing clinician: 10/19/2021      Next office visit with prescribing clinician: Visit date not found     Comprehensive Metabolic Panel (11/24/2020 15:10)  CBC & Differential (11/24/2020 15:10)         Danay Galarza CMA  02/21/22, 15:00 EST     Phenergan to [facility] pharmacy

## 2022-02-22 ENCOUNTER — TELEPHONE (OUTPATIENT)
Dept: FAMILY MEDICINE CLINIC | Facility: CLINIC | Age: 82
End: 2022-02-22
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
## 2022-03-01 DIAGNOSIS — G14 POST POLIOMYELITIS SYNDROME: ICD-10-CM

## 2022-03-01 DIAGNOSIS — F41.9 ANXIETY: ICD-10-CM

## 2022-03-01 DIAGNOSIS — F51.01 PRIMARY INSOMNIA: ICD-10-CM

## 2022-03-01 RX ORDER — ZOLPIDEM TARTRATE 10 MG/1
10 TABLET ORAL NIGHTLY PRN
Qty: 30 TABLET | Refills: 0 | Status: SHIPPED | OUTPATIENT
Start: 2022-03-01 | End: 2022-03-29 | Stop reason: SDUPTHER

## 2022-03-01 RX ORDER — FENTANYL 100 UG/H
1 PATCH TRANSDERMAL
Qty: 10 PATCH | Refills: 0 | Status: SHIPPED | OUTPATIENT
Start: 2022-03-01 | End: 2022-04-29 | Stop reason: SDUPTHER

## 2022-03-01 RX ORDER — DIAZEPAM 10 MG/1
10 TABLET ORAL NIGHTLY PRN
Qty: 30 TABLET | Refills: 0 | Status: SHIPPED | OUTPATIENT
Start: 2022-03-01 | End: 2022-03-29 | Stop reason: SDUPTHER

## 2022-03-01 NOTE — TELEPHONE ENCOUNTER
Caller: Karina Saleem    Relationship: Self    Best call back number: [phone] (H)    Requested Prescriptions:   diazePAM (VALIUM) 10 MG tablet    zolpidem (AMBIEN) 10 MG tablet    fentaNYL (DURAGESIC) 100 MCG/HR patch    Pharmacy where request should be sent:  [facility] [address] - [phone]  - [phone] FX        Additional details provided by patient: PATIENT CALLED TO REQUEST A MEDICATION REFILL ON  HER MEDICATION. PATIENT STATES THAT HE HAS A 1 DAY SUPPLY LEFT.            Does the patient have less than a 3 day supply:  [x] Yes  [] No    Andrzej Bansal Rep   03/01/22 08:57 EST         THANKS

## 2022-03-09 DIAGNOSIS — G14 POST POLIOMYELITIS SYNDROME: ICD-10-CM

## 2022-03-09 NOTE — TELEPHONE ENCOUNTER
Caller: Karina Saleem    Relationship: Self    Best call back number: [phone]    Requested Prescriptions:   Requested Prescriptions     Pending Prescriptions Disp Refills   • HYDROcodone-acetaminophen (Norco) 7.5-325 MG per tablet 30 tablet 0     Sig: Take 1 tablet by mouth Daily As Needed for Moderate Pain .        Pharmacy where request should be sent: [address] [phone] [facility] [phone] FX       Does the patient have less than a 3 day supply:  [x] Yes  [] No    Andrzej Meneses Rep   03/09/22 15:21 EST

## 2022-03-10 RX ORDER — HYDROCODONE BITARTRATE AND ACETAMINOPHEN 7.5; 325 MG/1; MG/1
1 TABLET ORAL DAILY PRN
Qty: 30 TABLET | Refills: 0 | Status: SHIPPED | OUTPATIENT
Start: 2022-03-10 | End: 2022-04-11 | Stop reason: SDUPTHER

## 2022-03-25 ENCOUNTER — TELEPHONE (OUTPATIENT)
Dept: FAMILY MEDICINE CLINIC | Facility: CLINIC | Age: 82
End: 2022-03-25

## 2022-03-25 NOTE — TELEPHONE ENCOUNTER
Caller: Karina Saleem    Relationship: Self    Best call back number: [phone]     What medication are you requesting:     PATIENT HAS BROKEN SKIN ON HER LEFT ELBOW.  SHE USES IT A LOT TO GETTING IN AND OUT OF BED.          If a prescription is needed, what is your preferred pharmacy and phone number:      [facility], [place] - [address] [facility] [place] - [phone]  - [phone] FX              Additional notes:    SHE HAS A FRIEND WHO IS A NURSE AND SHE LOOKED AT IT AND SAID IT NEEDED SOMETHING MORE THAN NEOSPORIN AND A BAND AIDE, WHICH IS WHAT THE PATIENT WAS USING.

## 2022-03-25 NOTE — TELEPHONE ENCOUNTER
Patient was notified that the office is closed and  is on vacation next week and  is recommending that she have someone take her to the ER or [facility]. Patient states that she doesn't have any way of getting there.     was notified and suggested that the patient can come in sometime next week to see  and patient states that she is ok with that.     I told her I would have the  call her on Monday to schedule an appointment with .

## 2022-03-25 NOTE — TELEPHONE ENCOUNTER
Can I send Home Health Nurse for wound care to her house? Or she can go to [facility] if easier-------------I cant treat what I cant see

## 2022-03-28 NOTE — TELEPHONE ENCOUNTER
Called patient to schedule.  Patient states she has transportation issues and doesn't feel like she can get transportation to come in for an appt.  Advised patient that if she couldn't come in for an appointment that we have available that she can go to [facility] to have evaluated.

## 2022-03-29 DIAGNOSIS — F51.01 PRIMARY INSOMNIA: ICD-10-CM

## 2022-03-29 DIAGNOSIS — F41.9 ANXIETY: ICD-10-CM

## 2022-04-01 RX ORDER — ZOLPIDEM TARTRATE 10 MG/1
10 TABLET ORAL NIGHTLY PRN
Qty: 30 TABLET | Refills: 0 | Status: SHIPPED | OUTPATIENT
Start: 2022-04-01 | End: 2022-04-29 | Stop reason: SDUPTHER

## 2022-04-01 RX ORDER — DIAZEPAM 10 MG/1
10 TABLET ORAL NIGHTLY PRN
Qty: 30 TABLET | Refills: 0 | Status: SHIPPED | OUTPATIENT
Start: 2022-04-01 | End: 2022-04-29 | Stop reason: SDUPTHER

## 2022-04-04 ENCOUNTER — TELEPHONE (OUTPATIENT)
Dept: FAMILY MEDICINE CLINIC | Facility: CLINIC | Age: 82
End: 2022-04-04

## 2022-04-04 NOTE — TELEPHONE ENCOUNTER
Appt if poss  Otherwise, keep area clean w/ soap and water and covered w/ a bandage and then elbow pad for protection  OR GO TO ER if needed

## 2022-04-04 NOTE — TELEPHONE ENCOUNTER
Caller: Karina Saleem    Relationship: Self    Best call back number: [phone]    What medication are you requesting: ANTIBIOTICS     What are your current symptoms: LEFT ELBOW HAS WOUND THAT THE SKIN IS BROKEN OPEN     How long have you been experiencing symptoms: FEW WEEKS     Have you had these symptoms before:    [] Yes  [x] No    Have you been treated for these symptoms before:   [] Yes  [x] No    If a prescription is needed, what is your preferred pharmacy and phone number: [facility] [address] - [phone] [facility] [phone] FX     Additional notes:
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
## 2022-04-06 ENCOUNTER — TELEPHONE (OUTPATIENT)
Dept: FAMILY MEDICINE CLINIC | Facility: CLINIC | Age: 82
End: 2022-04-06

## 2022-04-06 NOTE — TELEPHONE ENCOUNTER
PATIENT CALLING IN REGARDS TO REQUEST A CALLBACK ABOUT HER LEFT ELBOW THAT IS BOTHERING HER. SHE SAYS ITS BURNING AND HURTING.

## 2022-04-11 DIAGNOSIS — G14 POST POLIOMYELITIS SYNDROME: ICD-10-CM

## 2022-04-11 DIAGNOSIS — S51.002D OPEN WOUND OF LEFT ELBOW, SUBSEQUENT ENCOUNTER: Primary | ICD-10-CM

## 2022-04-11 RX ORDER — PROMETHAZINE HYDROCHLORIDE 25 MG/1
25 TABLET ORAL EVERY 8 HOURS PRN
Qty: 46 TABLET | Refills: 0 | Status: SHIPPED | OUTPATIENT
Start: 2022-04-11 | End: 2022-05-17

## 2022-04-11 RX ORDER — HYDROCODONE BITARTRATE AND ACETAMINOPHEN 7.5; 325 MG/1; MG/1
1 TABLET ORAL DAILY PRN
Qty: 30 TABLET | Refills: 0 | Status: SHIPPED | OUTPATIENT
Start: 2022-04-11 | End: 2022-05-10 | Stop reason: SDUPTHER

## 2022-04-11 NOTE — TELEPHONE ENCOUNTER
THE PATIENT CALLED IN TO STATE THAT HER LEFT ARM AND HAND IS BEGINNING TO SWELL. A NURSE IS SUPPOSED TO COME AROUND 5 P.M. TODAY TO CHANGE THE PATIENT'S BANDAGE. HOWEVER, SHE IS CONCERNED ABOUT THIS ARM. PLEASE ADVISE BY CALLING [phone].

## 2022-04-11 NOTE — TELEPHONE ENCOUNTER
PATIENT IS REQUESTING HOME HEALTH AS SHE HAS NO ONE TO CARE FOR HER AND SHE HAS A SORE ON HER ARM THAT WAS INFECTED. SHE WENT TO URGENT CARE. BUT SHE NEEDS SOMEONE TO CHANGE THE BANDAGES.    Caller: Karina Saleem    Relationship: Self    Best call back number: [phone]     Requested Prescriptions:   Requested Prescriptions     Pending Prescriptions Disp Refills   • HYDROcodone-acetaminophen (Norco) 7.5-325 MG per tablet 30 tablet 0     Sig: Take 1 tablet by mouth Daily As Needed for Moderate Pain .   • promethazine (PHENERGAN) 25 MG tablet 46 tablet 0     Sig: Take 1 tablet by mouth Every 8 (Eight) Hours As Needed for Nausea or Vomiting.        Pharmacy where request should be sent:    [address] - [phone]  - [phone]   [phone]    Does the patient have less than a 3 day supply:  [x] Yes  [] No    Andrzej Kitchen Rep   04/11/22 08:23 EDT

## 2022-04-13 ENCOUNTER — TELEPHONE (OUTPATIENT)
Dept: FAMILY MEDICINE CLINIC | Facility: CLINIC | Age: 82
End: 2022-04-13

## 2022-04-13 NOTE — TELEPHONE ENCOUNTER
Caller: Karina Saleem    Relationship: Self    Best call back number: [phone]    What was the call regarding: RETURNED CALL TO CLINIC.  PATIENT STATES THAT NO ONE HAS ANY ACCESS TO HER MEDICAL INFORMATION AND DOES NOT WANT ANY INFORMATION RELEASED TO ANY FAMILY MEMBER.    Do you require a callback: YES

## 2022-04-13 NOTE — TELEPHONE ENCOUNTER
No, pt states the bandage was just too tight and causing the swelling - no blood clot concern. The nurse came out yest to change bandage and said it looked good and the swelling was down. However, the bandage fell off today and [facility] is trying to get a nurse out today to redress it.

## 2022-04-27 RX ORDER — LEVOTHYROXINE SODIUM 0.2 MG/1
200 TABLET ORAL DAILY
Qty: 30 TABLET | Refills: 0 | Status: SHIPPED | OUTPATIENT
Start: 2022-04-27 | End: 2022-07-29 | Stop reason: SDUPTHER

## 2022-04-27 NOTE — TELEPHONE ENCOUNTER
Incoming Refill Request      Medication requested (name and dose): Levothyroxine 200mcg    [facility] be [facility]    Additional details provided by patient: n/a    Best call back number: n/a    Does the patient have less than a 3 day supply:  [] Yes  [x] No    Haritha Beasley, RegSched Rep  04/27/22, 12:41 EDT

## 2022-04-27 NOTE — TELEPHONE ENCOUNTER
Rx Refill Note  Requested Prescriptions     Pending Prescriptions Disp Refills   • levothyroxine (SYNTHROID, LEVOTHROID) 200 MCG tablet 30 tablet 0     Sig: Take 1 tablet by mouth Daily.      Last office visit with prescribing clinician: 10/19/2021      Next office visit with prescribing clinician: 4/29/2022     TSH (06/03/2021 16:03)         Sharron Rowe, RT  04/27/22, 13:24 EDT

## 2022-04-29 ENCOUNTER — OFFICE VISIT (OUTPATIENT)
Dept: FAMILY MEDICINE CLINIC | Facility: CLINIC | Age: 82
End: 2022-04-29

## 2022-04-29 VITALS
SYSTOLIC BLOOD PRESSURE: 150 MMHG | TEMPERATURE: 98 F | HEART RATE: 78 BPM | OXYGEN SATURATION: 94 % | RESPIRATION RATE: 18 BRPM | DIASTOLIC BLOOD PRESSURE: 76 MMHG | HEIGHT: 62 IN

## 2022-04-29 DIAGNOSIS — L03.116 CELLULITIS OF LEFT LOWER EXTREMITY: ICD-10-CM

## 2022-04-29 DIAGNOSIS — S50.312A ABRASION OF LEFT ELBOW, INITIAL ENCOUNTER: Primary | ICD-10-CM

## 2022-04-29 DIAGNOSIS — G14 POST POLIOMYELITIS SYNDROME: ICD-10-CM

## 2022-04-29 DIAGNOSIS — F51.01 PRIMARY INSOMNIA: ICD-10-CM

## 2022-04-29 DIAGNOSIS — F41.9 ANXIETY: ICD-10-CM

## 2022-04-29 PROCEDURE — 99214 OFFICE O/P EST MOD 30 MIN: CPT | Performed by: FAMILY MEDICINE

## 2022-04-29 RX ORDER — MULTIPLE VITAMINS W/ MINERALS TAB 9MG-400MCG
1 TAB ORAL DAILY
COMMUNITY

## 2022-04-29 RX ORDER — ZOLPIDEM TARTRATE 10 MG/1
10 TABLET ORAL NIGHTLY PRN
Qty: 30 TABLET | Refills: 0 | Status: SHIPPED | OUTPATIENT
Start: 2022-04-29 | End: 2022-05-25

## 2022-04-29 RX ORDER — FENTANYL 100 UG/H
1 PATCH TRANSDERMAL
Qty: 10 PATCH | Refills: 0 | Status: SHIPPED | OUTPATIENT
Start: 2022-04-29 | End: 2022-06-08 | Stop reason: SDUPTHER

## 2022-04-29 RX ORDER — CLINDAMYCIN HYDROCHLORIDE 300 MG/1
300 CAPSULE ORAL 3 TIMES DAILY
Qty: 21 CAPSULE | Refills: 0 | Status: SHIPPED | OUTPATIENT
Start: 2022-04-29 | End: 2022-08-22

## 2022-04-29 RX ORDER — DIAZEPAM 10 MG/1
10 TABLET ORAL NIGHTLY PRN
Qty: 30 TABLET | Refills: 0 | Status: SHIPPED | OUTPATIENT
Start: 2022-04-29 | End: 2022-05-25

## 2022-04-29 RX ORDER — ERGOCALCIFEROL 1.25 MG/1
50000 CAPSULE ORAL WEEKLY
Qty: 12 CAPSULE | Refills: 1 | Status: SHIPPED | OUTPATIENT
Start: 2022-04-29

## 2022-05-02 ENCOUNTER — TELEPHONE (OUTPATIENT)
Dept: FAMILY MEDICINE CLINIC | Facility: CLINIC | Age: 82
End: 2022-05-02

## 2022-05-02 NOTE — TELEPHONE ENCOUNTER
Caller: Karina Saleem    Relationship: Self    Best call back number: [phone]    Who are you requesting to speak with (clinical staff, provider,  specific staff member): CLINICAL STAFF     What was the call regarding: STATES ANTIBIOTICS WAS CALLED OUT FOR HER AND WANTING TO KNOW IT WAS FOR PLEASE CALL AND ADVISE

## 2022-05-09 ENCOUNTER — TELEPHONE (OUTPATIENT)
Dept: FAMILY MEDICINE CLINIC | Facility: CLINIC | Age: 82
End: 2022-05-09

## 2022-05-09 NOTE — TELEPHONE ENCOUNTER
Caller: MELA    Relationship: Home Health    Best call back number: [phone]    MELA WITH [facility] IS GOING TO SEND A FAX OVER ON THE PATIENT FOR ORDERS TO EVALUATE THE PATIENT FOR PHYSICAL THERAPY HOME HEALTH    PLEASE BE ON THE LOOK OUT FOR THAT.

## 2022-05-10 DIAGNOSIS — G14 POST POLIOMYELITIS SYNDROME: ICD-10-CM

## 2022-05-10 RX ORDER — HYDROCODONE BITARTRATE AND ACETAMINOPHEN 7.5; 325 MG/1; MG/1
1 TABLET ORAL DAILY PRN
Qty: 30 TABLET | Refills: 0 | Status: SHIPPED | OUTPATIENT
Start: 2022-05-10 | End: 2022-06-08 | Stop reason: SDUPTHER

## 2022-05-10 NOTE — TELEPHONE ENCOUNTER
Caller: Karina Saleem    Relationship: Self    Best call back number: [phone]    Requested Prescriptions:   Requested Prescriptions     Pending Prescriptions Disp Refills   • HYDROcodone-acetaminophen (Norco) 7.5-325 MG per tablet 30 tablet 0     Sig: Take 1 tablet by mouth Daily As Needed for Moderate Pain .        Pharmacy where request should be sent: [address] [phone] [facility] [phone] FX     Additional details provided by patient: HAS 1 PILL LEFT     Does the patient have less than a 3 day supply:  [x] Yes  [] No    Andrzej Ch Rep   05/10/22 08:15 EDT

## 2022-05-17 ENCOUNTER — TELEPHONE (OUTPATIENT)
Dept: FAMILY MEDICINE CLINIC | Facility: CLINIC | Age: 82
End: 2022-05-17

## 2022-05-17 RX ORDER — PROMETHAZINE HYDROCHLORIDE 25 MG/1
TABLET ORAL
Qty: 46 TABLET | Refills: 0 | Status: SHIPPED | OUTPATIENT
Start: 2022-05-17 | End: 2022-06-27

## 2022-05-20 ENCOUNTER — TELEPHONE (OUTPATIENT)
Dept: FAMILY MEDICINE CLINIC | Facility: CLINIC | Age: 82
End: 2022-05-20
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
## 2022-05-23 ENCOUNTER — TELEPHONE (OUTPATIENT)
Dept: FAMILY MEDICINE CLINIC | Facility: CLINIC | Age: 82
End: 2022-05-23

## 2022-05-23 NOTE — TELEPHONE ENCOUNTER
Caller: Karina Saleem    Relationship: Self    Best call back number: [phone]    What is the best time to reach you: ANY     Who are you requesting to speak with (clinical staff, provider,  specific staff member): CLINICAL     Do you know the name of the person who called: PATIENT     What was the call regarding: PATIENT WENT TO THE ER ON Friday ABOUT HER FOOT AND SHE STATED THEY TOOK BLOOD AND THAT'S ABOUT IT AND TOLD HER TO GO HOME .     Do you require a callback: YES

## 2022-05-25 DIAGNOSIS — F41.9 ANXIETY: ICD-10-CM

## 2022-05-25 DIAGNOSIS — F51.01 PRIMARY INSOMNIA: ICD-10-CM

## 2022-05-25 RX ORDER — ZOLPIDEM TARTRATE 10 MG/1
TABLET ORAL
Qty: 30 TABLET | Refills: 0 | Status: SHIPPED | OUTPATIENT
Start: 2022-05-25 | End: 2022-06-23 | Stop reason: SDUPTHER

## 2022-05-25 RX ORDER — DIAZEPAM 10 MG/1
TABLET ORAL
Qty: 30 TABLET | Refills: 0 | Status: SHIPPED | OUTPATIENT
Start: 2022-05-25 | End: 2022-06-23 | Stop reason: SDUPTHER

## 2022-05-25 NOTE — TELEPHONE ENCOUNTER
Caller: Karina Saleem    Relationship: Self    Best call back number: [phone]    Requested Prescriptions:   Requested Prescriptions     Pending Prescriptions Disp Refills   • zolpidem (AMBIEN) 10 MG tablet [Pharmacy Med Name: zolpidem 10 mg tablet] 30 tablet 0     Sig: TAKE ONE TABLET BY MOUTH EVERY NIGHT AS NEEDED FOR SLEEP   • diazePAM (VALIUM) 10 MG tablet [Pharmacy Med Name: diazepam 10 mg tablet] 30 tablet 0     Sig: TAKE ONE TABLET AT NIGHT AS NEEDED FOR ANXIETY        Pharmacy where request should be sent: [address] - [phone]  - [phone] FX     Additional details provided by patient: SHE ALSO ADVISED THAT HER RIGHT LEG IS STILL SWOLLEN    Does the patient have less than a 3 day supply:  [x] Yes  [] No    Andrzej Boothe Rep   05/25/22 13:50 EDT

## 2022-05-25 NOTE — TELEPHONE ENCOUNTER
Rx Refill Note  Requested Prescriptions     Pending Prescriptions Disp Refills   • zolpidem (AMBIEN) 10 MG tablet [Pharmacy Med Name: zolpidem 10 mg tablet] 30 tablet 0     Sig: TAKE ONE TABLET BY MOUTH EVERY NIGHT AS NEEDED FOR SLEEP   • diazePAM (VALIUM) 10 MG tablet [Pharmacy Med Name: diazepam 10 mg tablet] 30 tablet 0     Sig: TAKE ONE TABLET AT NIGHT AS NEEDED FOR ANXIETY      Last office visit with prescribing clinician: 4/29/2022      Next office visit with prescribing clinician: 10/31/2022     SCANNED - LABS (05/20/2022)         Sharron Rowe, RT  05/25/22, 12:18 EDT

## 2022-06-08 ENCOUNTER — TELEPHONE (OUTPATIENT)
Dept: FAMILY MEDICINE CLINIC | Facility: CLINIC | Age: 82
End: 2022-06-08

## 2022-06-08 DIAGNOSIS — G14 POST POLIOMYELITIS SYNDROME: ICD-10-CM

## 2022-06-08 RX ORDER — HYDROCODONE BITARTRATE AND ACETAMINOPHEN 7.5; 325 MG/1; MG/1
1 TABLET ORAL DAILY PRN
Qty: 30 TABLET | Refills: 0 | Status: SHIPPED | OUTPATIENT
Start: 2022-06-08 | End: 2022-07-08 | Stop reason: SDUPTHER

## 2022-06-08 RX ORDER — FENTANYL 100 UG/H
1 PATCH TRANSDERMAL
Qty: 10 PATCH | Refills: 0 | Status: SHIPPED | OUTPATIENT
Start: 2022-06-08 | End: 2022-07-08 | Stop reason: SDUPTHER

## 2022-06-08 NOTE — TELEPHONE ENCOUNTER
Caller: BARB     Relationship: Home Health     Best call back number: [phone]     What is the best time to reach you: ANY     Who are you requesting to speak with (clinical staff, provider,  specific staff member): CANDY        What was the call regarding: PATIENTS WOUND ON LEFT FOREARM ARM IS IMPROVING GETTING SMALLER IN SIZE AND LESS DRAINAGE, WILL BE LOOKING AT IT OVER THE NEXT 4 WEEKS AND CAN THEN DISCHARGE HER.       Do you require a callback: IF NEEDED.

## 2022-06-08 NOTE — TELEPHONE ENCOUNTER
Caller: Karina Saleem    Relationship: Self    Best call back number:[phone]     Requested Prescriptions:   Requested Prescriptions     Pending Prescriptions Disp Refills   • fentaNYL (DURAGESIC) 100 MCG/HR patch 10 patch 0     Sig: Place 1 patch on the skin as directed by provider Every 72 (Seventy-Two) Hours.   • HYDROcodone-acetaminophen (Norco) 7.5-325 MG per tablet 30 tablet 0     Sig: Take 1 tablet by mouth Daily As Needed for Moderate Pain .        Pharmacy where request should be sent: [address] - [phone] [facility] [phone] FX     Does the patient have less than a 3 day supply:  [x] Yes  [] No    Aditya Zhao   06/08/22 14:26 EDT     ADDITIONAL:  THE SKIN PROBLEM HAS NOT GOTTEN ANY BETTER

## 2022-06-08 NOTE — TELEPHONE ENCOUNTER
CLAUDIA Lopez @ [facility] - p#[phone]    States pt was last seen 5/30 by nursing and is scheduled again for Friday 6/10. PT was last there 6/6. Jessica will get msg out to the nurse to see what is going on w pt tx, and have her call us back.

## 2022-06-08 NOTE — TELEPHONE ENCOUNTER
Caller: BARB    Relationship: Home Health    Best call back number: [phone]    What is the best time to reach you: ANY    Who are you requesting to speak with (clinical staff, provider,  specific staff member): CANDY      What was the call regarding: PATIENTS WOUND ON LEFT FOREARM ARM IS IMPROVING GETTING SMALLER IN SIZE AND LESS DRAINAGE, WILL BE LOOKING AT IT OVER THE NEXT 4 WEEKS AND CAN THEN DISCHARGE HER.      Do you require a callback: IF NEEDED.

## 2022-06-10 ENCOUNTER — TELEPHONE (OUTPATIENT)
Dept: FAMILY MEDICINE CLINIC | Facility: CLINIC | Age: 82
End: 2022-06-10

## 2022-06-10 NOTE — TELEPHONE ENCOUNTER
Incoming Refill Request      Medication requested (name and dose): Magic Butt Cream     Pharmacy where request should be sent:  [facility]    Additional details provided by patient: n/a    Best call back number:     Does the patient have less than a 3 day supply:  [] Yes  [x] No    Andzrej Islas Rep  06/10/22, 14:17 EDT

## 2022-06-13 NOTE — TELEPHONE ENCOUNTER
HUB to read    This was faxed to [facility] earlier today and I received a confirmation fax that this went through successfully to the pharmacy.

## 2022-06-23 DIAGNOSIS — F51.01 PRIMARY INSOMNIA: ICD-10-CM

## 2022-06-23 DIAGNOSIS — F41.9 ANXIETY: ICD-10-CM

## 2022-06-23 RX ORDER — ZOLPIDEM TARTRATE 10 MG/1
10 TABLET ORAL NIGHTLY PRN
Qty: 30 TABLET | Refills: 0 | Status: SHIPPED | OUTPATIENT
Start: 2022-06-23 | End: 2022-07-21 | Stop reason: SDUPTHER

## 2022-06-23 RX ORDER — DIAZEPAM 10 MG/1
10 TABLET ORAL NIGHTLY PRN
Qty: 30 TABLET | Refills: 0 | Status: SHIPPED | OUTPATIENT
Start: 2022-06-23 | End: 2022-07-21 | Stop reason: SDUPTHER

## 2022-06-23 NOTE — TELEPHONE ENCOUNTER
"PATIENT ALSO STATED THAT SHE IS STILL HAVING THE SAME ISSUES WITH THE \"BUTT CREAM\"         Caller: TioraymondKarina    Relationship: Self    Best call back number:    Karina Saleem (Self) [phone] (H)         Requested Prescriptions:   Requested Prescriptions     Pending Prescriptions Disp Refills   • diazePAM (VALIUM) 10 MG tablet 30 tablet 0   • zolpidem (AMBIEN) 10 MG tablet 30 tablet 0     Sig: Take 1 tablet by mouth At Night As Needed for Sleep.        Pharmacy where request should be sent: [address] [phone] [facility] [phone] FX     Additional details provided by patient:     Does the patient have less than a 3 day supply:  [x] Yes  [] No    Andrzej García   06/23/22 15:54 EDT       "

## 2022-06-27 RX ORDER — PROMETHAZINE HYDROCHLORIDE 25 MG/1
TABLET ORAL
Qty: 46 TABLET | Refills: 0 | Status: SHIPPED | OUTPATIENT
Start: 2022-06-27 | End: 2022-07-29 | Stop reason: SDUPTHER

## 2022-07-08 DIAGNOSIS — G14 POST POLIOMYELITIS SYNDROME: ICD-10-CM

## 2022-07-08 RX ORDER — HYDROCODONE BITARTRATE AND ACETAMINOPHEN 7.5; 325 MG/1; MG/1
1 TABLET ORAL DAILY PRN
Qty: 30 TABLET | Refills: 0 | Status: SHIPPED | OUTPATIENT
Start: 2022-07-08 | End: 2022-08-08 | Stop reason: SDUPTHER

## 2022-07-08 RX ORDER — FENTANYL 100 UG/H
1 PATCH TRANSDERMAL
Qty: 10 PATCH | Refills: 0 | Status: SHIPPED | OUTPATIENT
Start: 2022-07-08 | End: 2022-08-08 | Stop reason: SDUPTHER

## 2022-07-08 NOTE — TELEPHONE ENCOUNTER
Caller: Tioraymond Karina    Relationship: Self    Best call back number: [phone]    Requested Prescriptions:   Requested Prescriptions     Pending Prescriptions Disp Refills   • HYDROcodone-acetaminophen (Norco) 7.5-325 MG per tablet 30 tablet 0     Sig: Take 1 tablet by mouth Daily As Needed for Moderate Pain .   • fentaNYL (DURAGESIC) 100 MCG/HR patch 10 patch 0     Sig: Place 1 patch on the skin as directed by provider Every 72 (Seventy-Two) Hours.        Pharmacy where request should be sent: [address] - [phone] [facility] [phone] FX     Additional details provided by patient: PATIENT HAS A 1 DAY SUPPLY LEFT.     Does the patient have less than a 3 day supply:  [x] Yes  [] No    Andrzej Chamorro Rep   07/08/22 14:46 EDT

## 2022-07-21 DIAGNOSIS — F41.9 ANXIETY: ICD-10-CM

## 2022-07-21 DIAGNOSIS — F51.01 PRIMARY INSOMNIA: ICD-10-CM

## 2022-07-21 RX ORDER — DIAZEPAM 10 MG/1
10 TABLET ORAL NIGHTLY PRN
Qty: 30 TABLET | Refills: 0 | Status: SHIPPED | OUTPATIENT
Start: 2022-07-21 | End: 2022-08-16 | Stop reason: SDUPTHER

## 2022-07-21 RX ORDER — ZOLPIDEM TARTRATE 10 MG/1
10 TABLET ORAL NIGHTLY PRN
Qty: 30 TABLET | Refills: 0 | Status: SHIPPED | OUTPATIENT
Start: 2022-07-21 | End: 2022-08-16 | Stop reason: SDUPTHER

## 2022-07-21 NOTE — TELEPHONE ENCOUNTER
Caller: TioNoelle andraderaine    Relationship: Self    Best call back number: [phone]      Requested Prescriptions:   Requested Prescriptions     Pending Prescriptions Disp Refills   • zolpidem (AMBIEN) 10 MG tablet 30 tablet 0     Sig: Take 1 tablet by mouth At Night As Needed for Sleep.   • diazePAM (VALIUM) 10 MG tablet 30 tablet 0     Sig: Take 1 tablet by mouth At Night As Needed for Anxiety.        Pharmacy where request should be sent: [address] [phone]  - [phone] FX     Additional details provided by patient: HAS LESS THAN 3 DAYS.    Does the patient have less than a 3 day supply:  [x] Yes  [] No    Ludivina Wagner, EFREM   07/21/22 10:04 EDT

## 2022-07-29 RX ORDER — PROMETHAZINE HYDROCHLORIDE 25 MG/1
25 TABLET ORAL EVERY 8 HOURS PRN
Qty: 46 TABLET | Refills: 0 | Status: SHIPPED | OUTPATIENT
Start: 2022-07-29 | End: 2022-09-07 | Stop reason: SDUPTHER

## 2022-07-29 RX ORDER — LEVOTHYROXINE SODIUM 0.2 MG/1
200 TABLET ORAL DAILY
Qty: 30 TABLET | Refills: 0 | Status: SHIPPED | OUTPATIENT
Start: 2022-07-29 | End: 2022-08-31

## 2022-07-29 NOTE — TELEPHONE ENCOUNTER
Caller: Karina Saleem    Relationship: Self    Best call back number: [phone]  Requested Prescriptions:   Requested Prescriptions     Pending Prescriptions Disp Refills   • promethazine (PHENERGAN) 25 MG tablet 46 tablet 0     Sig: Take 1 tablet by mouth Every 8 (Eight) Hours As Needed for Nausea or Vomiting.   • levothyroxine (SYNTHROID, LEVOTHROID) 200 MCG tablet 30 tablet 0     Sig: Take 1 tablet by mouth Daily.        Pharmacy where request should be sent: [address] [phone]  - [phone] FX     Additional details provided by patient: PATIENT STATES THAT HER LEG IS STILL SWOLLEN     Does the patient have less than a 3 day supply:  [x] Yes  [] No    Andrzej Shelley Rep   07/29/22 15:48 EDT

## 2022-07-29 NOTE — TELEPHONE ENCOUNTER
Rx Refill Note  Requested Prescriptions     Pending Prescriptions Disp Refills   • promethazine (PHENERGAN) 25 MG tablet 46 tablet 0     Sig: Take 1 tablet by mouth Every 8 (Eight) Hours As Needed for Nausea or Vomiting.   • levothyroxine (SYNTHROID, LEVOTHROID) 200 MCG tablet 30 tablet 0     Sig: Take 1 tablet by mouth Daily.      Last office visit with prescribing clinician: 4/29/2022      Next office visit with prescribing clinician: 8/19/2022     TSH (06/03/2021 16:03)         Sharron Rowe, RT  07/29/22, 15:52 EDT

## 2022-08-08 DIAGNOSIS — G14 POST POLIOMYELITIS SYNDROME: ICD-10-CM

## 2022-08-08 RX ORDER — FENTANYL 100 UG/H
1 PATCH TRANSDERMAL
Qty: 10 PATCH | Refills: 0 | Status: SHIPPED | OUTPATIENT
Start: 2022-08-08 | End: 2022-09-07 | Stop reason: SDUPTHER

## 2022-08-08 RX ORDER — HYDROCODONE BITARTRATE AND ACETAMINOPHEN 7.5; 325 MG/1; MG/1
1 TABLET ORAL DAILY PRN
Qty: 30 TABLET | Refills: 0 | Status: SHIPPED | OUTPATIENT
Start: 2022-08-08 | End: 2022-09-07 | Stop reason: SDUPTHER

## 2022-08-08 NOTE — TELEPHONE ENCOUNTER
Caller: Karina Saleem    Relationship: Self    Best call back number: [phone]    Requested Prescriptions:   Requested Prescriptions     Pending Prescriptions Disp Refills   • HYDROcodone-acetaminophen (Norco) 7.5-325 MG per tablet 30 tablet 0     Sig: Take 1 tablet by mouth Daily As Needed for Moderate Pain .   • fentaNYL (DURAGESIC) 100 MCG/HR patch 10 patch 0     Sig: Place 1 patch on the skin as directed by provider Every 72 (Seventy-Two) Hours.        Pharmacy where request should be sent: [address] - [phone]  - [phone] FX     Additional details provided by patient: PATIENT STATES SHE ONLY HAS ENOUGH MEDICATION FOR TODAY    Does the patient have less than a 3 day supply:  [x] Yes  [] No    Rafal Marie   08/08/22 09:48 EDT

## 2022-08-08 NOTE — TELEPHONE ENCOUNTER
Incoming Refill Request      Medication requested (name and dose): Magic Butt Cream    Pharm[facility] where [facility] be sent[facility]    Additional details provided by patient: n/a    Best call back number:     Does the patient have less than a 3 day supply:  [] Yes  [x] No    Andrzej Islas Rep  08/08/22, 08:27 EDT

## 2022-08-16 DIAGNOSIS — F51.01 PRIMARY INSOMNIA: ICD-10-CM

## 2022-08-16 DIAGNOSIS — F41.9 ANXIETY: ICD-10-CM

## 2022-08-16 RX ORDER — DIAZEPAM 10 MG/1
10 TABLET ORAL NIGHTLY PRN
Qty: 30 TABLET | Refills: 0 | Status: SHIPPED | OUTPATIENT
Start: 2022-08-16 | End: 2022-09-15 | Stop reason: SDUPTHER

## 2022-08-16 RX ORDER — ZOLPIDEM TARTRATE 10 MG/1
10 TABLET ORAL NIGHTLY PRN
Qty: 30 TABLET | Refills: 0 | Status: SHIPPED | OUTPATIENT
Start: 2022-08-16 | End: 2022-09-15 | Stop reason: SDUPTHER

## 2022-08-19 ENCOUNTER — TELEPHONE (OUTPATIENT)
Dept: FAMILY MEDICINE CLINIC | Facility: CLINIC | Age: 82
End: 2022-08-19

## 2022-08-19 NOTE — TELEPHONE ENCOUNTER
Pt informed that transportation is scheduled to pick her up @ 1:30, to be at the office by 2:15. They will call within 24 hrs of appt, if the claire unable to provide transportation for some reason.     CLAUDIA Hartley @ [facility] id/ref# [id number]

## 2022-08-19 NOTE — TELEPHONE ENCOUNTER
THE PATIENT STATES THAT THE [facility] TRANSPORTATION DEPARTMENT WILL NOT GIVE HER  A RIDE TO HER APPOINTMENT ON Monday, 08/22/2022, UNLESS THE OFFICE CALLS TO TELL THEM THAT THE PATIENT NEEDS THIS APPOINTMENT. THE PATIENT IS BEING SEEN FOR CONTINUED SWELLING IN HER LEGS.     PLEASE ADVISE BY CALLING [facility] -[phone].

## 2022-08-22 ENCOUNTER — OFFICE VISIT (OUTPATIENT)
Dept: FAMILY MEDICINE CLINIC | Facility: CLINIC | Age: 82
End: 2022-08-22

## 2022-08-22 VITALS
RESPIRATION RATE: 16 BRPM | HEART RATE: 79 BPM | DIASTOLIC BLOOD PRESSURE: 80 MMHG | TEMPERATURE: 97.3 F | OXYGEN SATURATION: 94 % | SYSTOLIC BLOOD PRESSURE: 160 MMHG | HEIGHT: 62 IN

## 2022-08-22 DIAGNOSIS — R03.0 ELEVATED BLOOD PRESSURE READING IN OFFICE WITHOUT DIAGNOSIS OF HYPERTENSION: ICD-10-CM

## 2022-08-22 DIAGNOSIS — M79.89 RIGHT LEG SWELLING: Primary | ICD-10-CM

## 2022-08-22 DIAGNOSIS — H01.00A BLEPHARITIS OF BOTH UPPER AND LOWER EYELID OF RIGHT EYE, UNSPECIFIED TYPE: ICD-10-CM

## 2022-08-22 DIAGNOSIS — B35.3 TINEA PEDIS OF RIGHT FOOT: ICD-10-CM

## 2022-08-22 PROCEDURE — 99214 OFFICE O/P EST MOD 30 MIN: CPT | Performed by: FAMILY MEDICINE

## 2022-08-22 RX ORDER — NYSTATIN 100000 U/G
1 CREAM TOPICAL 2 TIMES DAILY PRN
Qty: 60 G | Refills: 0 | Status: SHIPPED | OUTPATIENT
Start: 2022-08-22

## 2022-08-22 RX ORDER — TERBINAFINE HYDROCHLORIDE 250 MG/1
250 TABLET ORAL DAILY
Qty: 14 TABLET | Refills: 0 | Status: SHIPPED | OUTPATIENT
Start: 2022-08-22 | End: 2023-02-13

## 2022-08-22 RX ORDER — POLYMYXIN B SULFATE AND TRIMETHOPRIM 1; 10000 MG/ML; [USP'U]/ML
1 SOLUTION OPHTHALMIC EVERY 6 HOURS
Qty: 10 ML | Refills: 0 | Status: SHIPPED | OUTPATIENT
Start: 2022-08-22

## 2022-08-22 RX ORDER — DIAPER,BRIEF,INFANT-TODD,DISP
1 EACH MISCELLANEOUS 2 TIMES DAILY PRN
Qty: 60 G | Refills: 0 | Status: SHIPPED | OUTPATIENT
Start: 2022-08-22

## 2022-08-26 ENCOUNTER — TELEPHONE (OUTPATIENT)
Dept: FAMILY MEDICINE CLINIC | Facility: CLINIC | Age: 82
End: 2022-08-26

## 2022-08-26 NOTE — TELEPHONE ENCOUNTER
Patient called stating when she was here for her appt on 8/22/2022 she was prescribed 2 new creams.  Patient states she is not sure where she is supposed to apply the creams.  Requesting a call back with clarification.

## 2022-08-26 NOTE — TELEPHONE ENCOUNTER
I wrote down the 4 different meds that go into MAKING HER OWN Butt Cream-------- She would have to buy some and some would come from the pharmacy  Which ones got filled by pharmacy???

## 2022-08-29 NOTE — TELEPHONE ENCOUNTER
PIA to read  PIA to ask question     I wrote down the 4 different meds that go into MAKING HER OWN Butt Cream-------- She would have to buy some and some would come from the pharmacy  Which ones got filled by pharmacy???

## 2022-08-30 ENCOUNTER — TELEPHONE (OUTPATIENT)
Dept: FAMILY MEDICINE CLINIC | Facility: CLINIC | Age: 82
End: 2022-08-30

## 2022-08-30 NOTE — TELEPHONE ENCOUNTER
Caller: Karina Saleem    Relationship to patient: Self    Best call back number: [phone]    Patient is needing: PATIENT IS WANTING TO KNOW IF MEDICARE WOULD PAY FOR A NEW WHEELCHAIR. PATIENT STATES THAT THE BRAKES ON HER CURRENT CHAIR ARE STRIPED. IN ORDER FOR THIS TO BE FIXED, SHE WOULD HAVE TO SEND HER WHEELCHAIR OFF AND SHE CANNOT GO THAT LONG WITH OUT IT.       [facility], [place]  Address: [address] [place] [address]  Phone: [phone]  Fax: [phone]

## 2022-08-30 NOTE — TELEPHONE ENCOUNTER
Update:  Pt was told today that her w/c cannot be fixed.  Would like order for standard w/c to go to [facility]

## 2022-08-31 RX ORDER — LEVOTHYROXINE SODIUM 0.2 MG/1
TABLET ORAL
Qty: 30 TABLET | Refills: 0 | Status: SHIPPED | OUTPATIENT
Start: 2022-08-31 | End: 2023-02-13 | Stop reason: SDUPTHER

## 2022-09-06 DIAGNOSIS — M25.512 ACUTE PAIN OF LEFT SHOULDER: Primary | ICD-10-CM

## 2022-09-06 DIAGNOSIS — G14 POST POLIOMYELITIS SYNDROME: ICD-10-CM

## 2022-09-06 DIAGNOSIS — M79.602 ARM PAIN, DIFFUSE, LEFT: Primary | ICD-10-CM

## 2022-09-07 DIAGNOSIS — G14 POST POLIOMYELITIS SYNDROME: ICD-10-CM

## 2022-09-07 RX ORDER — HYDROCODONE BITARTRATE AND ACETAMINOPHEN 7.5; 325 MG/1; MG/1
1 TABLET ORAL DAILY PRN
Qty: 30 TABLET | Refills: 0 | Status: SHIPPED | OUTPATIENT
Start: 2022-09-07 | End: 2022-10-04 | Stop reason: SDUPTHER

## 2022-09-07 RX ORDER — PROMETHAZINE HYDROCHLORIDE 25 MG/1
25 TABLET ORAL EVERY 8 HOURS PRN
Qty: 46 TABLET | Refills: 0 | Status: SHIPPED | OUTPATIENT
Start: 2022-09-07 | End: 2022-10-04

## 2022-09-07 RX ORDER — FENTANYL 100 UG/H
1 PATCH TRANSDERMAL
Qty: 10 PATCH | Refills: 0 | Status: SHIPPED | OUTPATIENT
Start: 2022-09-07 | End: 2022-10-04 | Stop reason: SDUPTHER

## 2022-09-07 NOTE — TELEPHONE ENCOUNTER
Caller: Maureen Saleemine    Relationship: Self    Best call back number: [phone] (H)    Requested Prescriptions:   Requested Prescriptions     Pending Prescriptions Disp Refills   • HYDROcodone-acetaminophen (Norco) 7.5-325 MG per tablet 30 tablet 0     Sig: Take 1 tablet by mouth Daily As Needed for Moderate Pain.   • fentaNYL (DURAGESIC) 100 MCG/HR patch 10 patch 0     Sig: Place 1 patch on the skin as directed by provider Every 72 (Seventy-Two) Hours.   • promethazine (PHENERGAN) 25 MG tablet 46 tablet 0     Sig: Take 1 tablet by mouth Every 8 (Eight) Hours As Needed for Nausea or Vomiting.        Pharmacy where request should be sent: [address] [phone] [facility] [phone] FX     Additional details provided by patient: PATIENT HAS 1 TABLET     Does the patient have less than a 3 day supply:  [x] Yes  [] No    EFREM Paez   09/07/22 08:11 EDT

## 2022-09-07 NOTE — TELEPHONE ENCOUNTER
Rx Refill Note  Requested Prescriptions     Pending Prescriptions Disp Refills   • HYDROcodone-acetaminophen (Norco) 7.5-325 MG per tablet 30 tablet 0     Sig: Take 1 tablet by mouth Daily As Needed for Moderate Pain.   • fentaNYL (DURAGESIC) 100 MCG/HR patch 10 patch 0     Sig: Place 1 patch on the skin as directed by provider Every 72 (Seventy-Two) Hours.   • promethazine (PHENERGAN) 25 MG tablet 46 tablet 0     Sig: Take 1 tablet by mouth Every 8 (Eight) Hours As Needed for Nausea or Vomiting.      Last office visit with prescribing clinician: 8/22/2022      Next office visit with prescribing clinician: 10/31/2022     SCANNED - LABS (05/20/2022)  CBC AND DIFFERENTIAL (02/20/2022 16:41)         Danay Galarza CMA  09/07/22, 10:04 EDT     INSPECT in chart

## 2022-09-08 RX ORDER — NITROGLYCERIN 0.4 MG/1
TABLET SUBLINGUAL
Qty: 50 TABLET | Refills: 1 | Status: SHIPPED | OUTPATIENT
Start: 2022-09-08

## 2022-09-08 NOTE — TELEPHONE ENCOUNTER
Rx Refill Note  Requested Prescriptions     Pending Prescriptions Disp Refills   • nitroglycerin (NITROSTAT) 0.4 MG SL tablet [Pharmacy Med Name: nitroglycerin 0.4 mg sublingual tablet] 50 tablet 1     Sig: DISSOLVE 1 TABLET UNDER THE TONGUE EVERY 5 MINUTES AS NEEDED FOR CHEST PAIN. DO NOT EXCEED A TOTAL OF 3 DOSES IN 15 MINUTES.      Last office visit with prescribing clinician: 8/22/2022      Next office visit with prescribing clinician: 10/31/2022     SCANNED - LABS (05/20/2022)         Danay Galarza CMA  09/08/22, 08:06 EDT

## 2022-09-15 DIAGNOSIS — F41.9 ANXIETY: ICD-10-CM

## 2022-09-15 DIAGNOSIS — F51.01 PRIMARY INSOMNIA: ICD-10-CM

## 2022-09-15 RX ORDER — ZOLPIDEM TARTRATE 10 MG/1
10 TABLET ORAL NIGHTLY PRN
Qty: 30 TABLET | Refills: 0 | Status: SHIPPED | OUTPATIENT
Start: 2022-09-15 | End: 2022-10-13 | Stop reason: SDUPTHER

## 2022-09-15 RX ORDER — DIAZEPAM 10 MG/1
10 TABLET ORAL NIGHTLY PRN
Qty: 30 TABLET | Refills: 0 | Status: SHIPPED | OUTPATIENT
Start: 2022-09-15 | End: 2022-10-13 | Stop reason: SDUPTHER

## 2022-09-15 NOTE — TELEPHONE ENCOUNTER
Rx Refill Note  Requested Prescriptions     Pending Prescriptions Disp Refills   • diazePAM (VALIUM) 10 MG tablet 30 tablet 0     Sig: Take 1 tablet by mouth At Night As Needed for Anxiety.   • zolpidem (AMBIEN) 10 MG tablet 30 tablet 0     Sig: Take 1 tablet by mouth At Night As Needed for Sleep.      Last office visit with prescribing clinician: 8/22/2022      Next office visit with prescribing clinician: 10/31/2022     SCANNED - LABS (05/20/2022)         Danay Galarza CMA  09/15/22, 08:49 EDT     INSPECT in chart

## 2022-09-15 NOTE — TELEPHONE ENCOUNTER
Caller: Maureen Saleemine    Relationship: Self    Best call back number: [phone]       Requested Prescriptions:   Requested Prescriptions     Pending Prescriptions Disp Refills   • diazePAM (VALIUM) 10 MG tablet 30 tablet 0     Sig: Take 1 tablet by mouth At Night As Needed for Anxiety.   • zolpidem (AMBIEN) 10 MG tablet 30 tablet 0     Sig: Take 1 tablet by mouth At Night As Needed for Sleep.        Pharmacy where request should be sent: [address] [phone]  - [phone] FX     Additional details provided by patient: PATIENT IS OUT OF THESE MEDICATIONS     Does the patient have less than a 3 day supply:  [x] Yes  [] No    Andrzej Hurd Rep   09/15/22 08:11 EDT

## 2022-09-17 PROBLEM — I10 ESSENTIAL (PRIMARY) HYPERTENSION: Status: RESOLVED | Noted: 2018-08-10 | Resolved: 2022-09-17

## 2022-09-20 ENCOUNTER — TELEPHONE (OUTPATIENT)
Dept: FAMILY MEDICINE CLINIC | Facility: CLINIC | Age: 82
End: 2022-09-20

## 2022-09-20 NOTE — TELEPHONE ENCOUNTER
Caller: Karina Saleem    Relationship: Self    Best call back number: [phone]    What is the best time to reach you: ANY    Who are you requesting to speak with (clinical staff, provider,  specific staff member): DR. CARRERO    What was the call regarding: PATIENT STATES SHE IS HAVING RIGHT SHOULDER PAIN, AND THE PAIN IS GOING UP HER NECK. SHE WENT TO THE EMERGENCY ROOM LAST Wednesday, 9/14/22 IN [facility] AND THEY DONE AN X-RAY AND NOTHING WAS BROKEN. PATIENT IS NOT SURE IF SHE NEEDS TO DO FURTHER IMAGING. PATIENT REQUESTS A CALL BACK TO DISCUSS FURTHER STEPS.     Do you require a callback: YES

## 2022-09-21 DIAGNOSIS — M25.511 ACUTE PAIN OF RIGHT SHOULDER: Primary | ICD-10-CM

## 2022-09-21 NOTE — TELEPHONE ENCOUNTER
Clarify Left or Rt shoulder since she had issues w/ Left shoulder when here------------just check

## 2022-09-21 NOTE — TELEPHONE ENCOUNTER
Yes, please order for [facility].   Pt will call to see if can be done when she goes for US on Friday @ 2PM

## 2022-09-30 ENCOUNTER — TELEPHONE (OUTPATIENT)
Dept: FAMILY MEDICINE CLINIC | Facility: CLINIC | Age: 82
End: 2022-09-30

## 2022-09-30 DIAGNOSIS — M25.511 ACUTE PAIN OF RIGHT SHOULDER: Primary | ICD-10-CM

## 2022-09-30 DIAGNOSIS — M12.811 ROTATOR CUFF ARTHROPATHY OF RIGHT SHOULDER: ICD-10-CM

## 2022-09-30 NOTE — TELEPHONE ENCOUNTER
Aditi Agarwal DO   9/29/2022  9:52 PM EDT         Moderate tendonitis of shoulder w/ partial tear of supraspinatus tendon------this is usu tx'd w/ Ptx -------does she want Home Ptx to come out??    No Blood clot in Rt LEG

## 2022-10-03 ENCOUNTER — TELEPHONE (OUTPATIENT)
Dept: FAMILY MEDICINE CLINIC | Facility: CLINIC | Age: 82
End: 2022-10-03

## 2022-10-03 DIAGNOSIS — G14 POST POLIOMYELITIS SYNDROME: ICD-10-CM

## 2022-10-03 NOTE — TELEPHONE ENCOUNTER
Caller: MELITON    Relationship to patient: Home Health    Best call back number: [phone]    Patient is needing: THEY ARE NEEDING NOTES FROM HER LAST VISIT. PLEASE SEND    FAX: [phone]

## 2022-10-03 NOTE — TELEPHONE ENCOUNTER
Caller: Karina Saleem    Relationship to patient: Self    Best call back number: [phone]    Patient is needing: PATIENT STATES SHE HAD AN ULTRASOUND DONE ON HER LEG LAST WEEK, AND WAS TOLD SHE DID NOT HAVE A BLOOD CLOT; HOWEVER, PATIENT WOULD LIKE A CALLBACK TO LET HER KNOW WHAT THE RESULTS WERE AND WHAT IS GOING ON WITH HER LEG.

## 2022-10-04 DIAGNOSIS — G14 POST POLIOMYELITIS SYNDROME: ICD-10-CM

## 2022-10-04 RX ORDER — HYDROCODONE BITARTRATE AND ACETAMINOPHEN 7.5; 325 MG/1; MG/1
1 TABLET ORAL DAILY PRN
Qty: 30 TABLET | Refills: 0 | Status: CANCELLED | OUTPATIENT
Start: 2022-10-04

## 2022-10-04 RX ORDER — HYDROCODONE BITARTRATE AND ACETAMINOPHEN 7.5; 325 MG/1; MG/1
1 TABLET ORAL DAILY PRN
Qty: 30 TABLET | Refills: 0 | Status: SHIPPED | OUTPATIENT
Start: 2022-10-04 | End: 2022-11-04 | Stop reason: SDUPTHER

## 2022-10-04 RX ORDER — PROMETHAZINE HYDROCHLORIDE 25 MG/1
TABLET ORAL
Qty: 46 TABLET | Refills: 0 | Status: SHIPPED | OUTPATIENT
Start: 2022-10-04 | End: 2022-11-09 | Stop reason: SDUPTHER

## 2022-10-04 RX ORDER — FENTANYL 100 UG/H
1 PATCH TRANSDERMAL
Qty: 10 PATCH | Refills: 0 | Status: CANCELLED | OUTPATIENT
Start: 2022-10-04

## 2022-10-04 RX ORDER — FENTANYL 100 UG/H
1 PATCH TRANSDERMAL
Qty: 10 PATCH | Refills: 0 | Status: SHIPPED | OUTPATIENT
Start: 2022-10-04 | End: 2022-11-04 | Stop reason: SDUPTHER

## 2022-10-04 NOTE — TELEPHONE ENCOUNTER
Pt informed and just wanted to know why it's still swollen. I asked if she was having pain in the leg, and she said NO. I asked if she was keeping it elevated and she said NO. I advised her to keep elevated as much as possible, and if starts to have pain or other concerns to let us know sooner. She is schedule for appt on 10/31.     She also states that she's been having to take an extra pain pill some days, bc of her shoulder pain. She needs refills of Hydrocodone and Fentayl pain patches, please. Jeanne    INSPECT RAN

## 2022-10-05 DIAGNOSIS — M79.89 RIGHT LEG SWELLING: ICD-10-CM

## 2022-10-05 DIAGNOSIS — G14 POST POLIOMYELITIS SYNDROME: Primary | ICD-10-CM

## 2022-10-12 ENCOUNTER — TELEPHONE (OUTPATIENT)
Dept: FAMILY MEDICINE CLINIC | Facility: CLINIC | Age: 82
End: 2022-10-12

## 2022-10-12 NOTE — TELEPHONE ENCOUNTER
Caller: Leo Saleem    Relationship: Self    Best call back number: [phone] (Home)    What is the best time to reach you: ANYTIME, ASAP    Who are you requesting to speak with (clinical staff, provider,  specific staff member): CLINICAL STAFF/ DR CARRERO    Do you know the name of the person who called: LEO SALEEM    What was the call regarding: PATIENT STATES SHE HAD AN MRI ON HER RIGHT SHOULDER THAT SHOWS TEAR- PATIENT WANTS TO KNOW HOW BAD TEAR IS BECAUSE IT IS HURTING A LOT, PLEASE CALL PATIENT BACK ASAP REGARDING THIS    Do you require a callback: YES, ASAP

## 2022-10-13 DIAGNOSIS — F41.9 ANXIETY: ICD-10-CM

## 2022-10-13 DIAGNOSIS — F51.01 PRIMARY INSOMNIA: ICD-10-CM

## 2022-10-13 DIAGNOSIS — A80.9 POLIOMYELITIS: Primary | Chronic | ICD-10-CM

## 2022-10-13 DIAGNOSIS — M25.511 ACUTE PAIN OF RIGHT SHOULDER: ICD-10-CM

## 2022-10-13 RX ORDER — DIAZEPAM 10 MG/1
10 TABLET ORAL NIGHTLY PRN
Qty: 30 TABLET | Refills: 0 | Status: SHIPPED | OUTPATIENT
Start: 2022-10-13 | End: 2022-11-09 | Stop reason: SDUPTHER

## 2022-10-13 RX ORDER — ZOLPIDEM TARTRATE 10 MG/1
10 TABLET ORAL NIGHTLY PRN
Qty: 30 TABLET | Refills: 0 | Status: SHIPPED | OUTPATIENT
Start: 2022-10-13 | End: 2022-11-09 | Stop reason: SDUPTHER

## 2022-10-13 NOTE — TELEPHONE ENCOUNTER
Yes, I called [facility] and they can get a therapist out there this wk. They just need a home PT order sent in, please.   Pt was informed.

## 2022-10-13 NOTE — TELEPHONE ENCOUNTER
Referral faxed to [facility]. Pt was scheduled for today, but she already resched for Mon.

## 2022-10-13 NOTE — TELEPHONE ENCOUNTER
PATIENT CALLED FOR MEDICATION REFILL OF  zolpidem (AMBIEN) 10 MG tablet  SHE HAS ONE TABLET LEFT    diazePAM (VALIUM) 10 MG tablet  SHE HAS ONE TABLET LEFT    [facility] - [place], [place] - [facility] [address] - [phone]  - [phone]   [phone]    CALL BACK NUMBER [phone]

## 2022-10-13 NOTE — TELEPHONE ENCOUNTER
Rx Refill Note  Requested Prescriptions     Pending Prescriptions Disp Refills   • zolpidem (AMBIEN) 10 MG tablet 30 tablet 0     Sig: Take 1 tablet by mouth At Night As Needed for Sleep.   • diazePAM (VALIUM) 10 MG tablet 30 tablet 0     Sig: Take 1 tablet by mouth At Night As Needed for Anxiety.      Last office visit with prescribing clinician: 8/22/2022      Next office visit with prescribing clinician: 10/31/2022            Danay Galarza CMA  10/13/22, 11:00 EDT     PATIENT CALLED FOR MEDICATION REFILL OF  zolpidem (AMBIEN) 10 MG tablet  SHE HAS ONE TABLET LEFT     diazePAM (VALIUM) 10 MG tablet  SHE HAS ONE TABLET LEFT     [facility] - [place], [place] - [address] [place] [address] Ave - [phone]  - [phone]   [phone]     CALL BACK NUMBER [phone]    INSPECT in chart

## 2022-10-18 ENCOUNTER — TELEPHONE (OUTPATIENT)
Dept: FAMILY MEDICINE CLINIC | Facility: CLINIC | Age: 82
End: 2022-10-18

## 2022-10-18 NOTE — TELEPHONE ENCOUNTER
Caller: Karina Saleem    Relationship: Self    Best call back number: [phone]    What was the call regarding: PATIENT STATED SHE WENT TO THE HOSPITAL ON Friday 10/14/22 FOR SOME TESTS ON HER RIGHT LEG.    PATIENT STATED SHE HAS NOT HEARD FROM ANYONE AS TO WHAT TESTS WERE DONE OR THE RESULTS OF THE TESTS.    Do you require a callback: YES

## 2022-10-19 NOTE — TELEPHONE ENCOUNTER
"Read note to patient.  Patient asks \"so my leg is just going to remain swollen for the rest of my life?\".  Patient seemed upset and had questions.  Please reach out to patient to discuss further.  "

## 2022-10-19 NOTE — TELEPHONE ENCOUNTER
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

## 2022-10-25 ENCOUNTER — TELEPHONE (OUTPATIENT)
Dept: FAMILY MEDICINE CLINIC | Facility: CLINIC | Age: 82
End: 2022-10-25

## 2022-10-25 NOTE — TELEPHONE ENCOUNTER
Caller: MELISSA    Relationship to patient: Other    Best call back number: [phone]    Patient is needing: MELISSA FROM [facility] IS CALLING REQUESTING THAT CANDY RETURN HER CALL.   MELISSA STATES SHE IS NEEDING CLARIFICATION IF IT IS NEEDED A NURSE VISIT FOR THE PATIENT, OR IF THEY HAVE ORDERS, IF THEY NEED TO ASSESS.

## 2022-10-25 NOTE — TELEPHONE ENCOUNTER
HUB to share    [facility] will be calling us back to let us know when next nursing visit is scheduled.

## 2022-10-25 NOTE — TELEPHONE ENCOUNTER
Caller: Karina Saleem    Relationship: Self    Best call back number: [phone]    What is the best time to reach you:ANYTIME  Who are you requesting to speak with (clinical staff, provider,  specific staff member): CLINICAL STAFF  Do you know the name of the person who called: SELF      What was the call regarding: PATIENT CALLED IN AND STATED SHE HAS A SORE ON HER LEFT ELBOW AND IS VERY PAINFUL. PATIENT STATES IF YOU CAN CALL HER SOME MEDICINE IN FOR IT. PLEASE CALL  Do you require a callback: YES

## 2022-10-25 NOTE — TELEPHONE ENCOUNTER
HUB to share    CLAUDIA Hackett @ [facility]. They are reaching out to pt today, to sched to come see her and eval wound. Ok for wound care, and to let us know if infected, etc

## 2022-10-25 NOTE — TELEPHONE ENCOUNTER
Aleta Salazar RegSched Rep routed conversation to [facility] 32 minutes ago (2:30 PM)     Aleta Salazar RegSched Rep 32 minutes ago (2:30 PM)     PB  Caller: MELISSA     Relationship to patient: Other     Best call back number: [phone]     Patient is needing: MELISSA FROM [facility] IS CALLING REQUESTING THAT CANDY RETURN HER CALL.   MELISSA STATES SHE IS NEEDING CLARIFICATION IF IT IS NEEDED A NURSE VISIT FOR THE PATIENT, OR IF THEY HAVE ORDERS, IF THEY NEED TO ASSESS.           Note

## 2022-11-03 NOTE — TELEPHONE ENCOUNTER
HUB to share    MANISH Nagy @ [facility] to have Roxann send wound care assessment. F#[phone]

## 2022-11-04 DIAGNOSIS — G14 POST POLIOMYELITIS SYNDROME: ICD-10-CM

## 2022-11-04 RX ORDER — FENTANYL 100 UG/H
1 PATCH TRANSDERMAL
Qty: 10 PATCH | Refills: 0 | Status: SHIPPED | OUTPATIENT
Start: 2022-11-04 | End: 2022-12-01 | Stop reason: SDUPTHER

## 2022-11-04 RX ORDER — HYDROCODONE BITARTRATE AND ACETAMINOPHEN 7.5; 325 MG/1; MG/1
1 TABLET ORAL DAILY PRN
Qty: 30 TABLET | Refills: 0 | Status: SHIPPED | OUTPATIENT
Start: 2022-11-04 | End: 2022-12-01 | Stop reason: SDUPTHER

## 2022-11-04 NOTE — TELEPHONE ENCOUNTER
Rx Refill Note  Requested Prescriptions     Pending Prescriptions Disp Refills   • fentaNYL (DURAGESIC) 100 MCG/HR patch 10 patch 0     Sig: Place 1 patch on the skin as directed by provider Every 72 (Seventy-Two) Hours.   • HYDROcodone-acetaminophen (Norco) 7.5-325 MG per tablet 30 tablet 0     Sig: Take 1 tablet by mouth Daily As Needed for Moderate Pain.      Last office visit with prescribing clinician: 8/22/2022      Next office visit with prescribing clinician: 11/21/2022     SCANNED - LABS (05/20/2022)         Danay Boss CMA  11/04/22, 08:49 EDT     INSPECT in chart

## 2022-11-04 NOTE — TELEPHONE ENCOUNTER
Caller: KacyyinaKarina    Relationship: Self    Best call back number: [phone]  Requested Prescriptions:   Requested Prescriptions     Pending Prescriptions Disp Refills   • fentaNYL (DURAGESIC) 100 MCG/HR patch 10 patch 0     Sig: Place 1 patch on the skin as directed by provider Every 72 (Seventy-Two) Hours.   • HYDROcodone-acetaminophen (Norco) 7.5-325 MG per tablet 30 tablet 0     Sig: Take 1 tablet by mouth Daily As Needed for Moderate Pain.        Pharmacy where request should be sent: [address] [phone] [facility] [phone] FX       Does the patient have less than a 3 day supply:  [x] Yes  [] No    Andrzej Shelley Rep   11/04/22 08:02 EDT

## 2022-11-09 DIAGNOSIS — F51.01 PRIMARY INSOMNIA: ICD-10-CM

## 2022-11-09 DIAGNOSIS — F41.9 ANXIETY: ICD-10-CM

## 2022-11-09 RX ORDER — DIAZEPAM 10 MG/1
10 TABLET ORAL NIGHTLY PRN
Qty: 30 TABLET | Refills: 0 | Status: SHIPPED | OUTPATIENT
Start: 2022-11-09 | End: 2022-12-06 | Stop reason: SDUPTHER

## 2022-11-09 RX ORDER — PROMETHAZINE HYDROCHLORIDE 25 MG/1
25 TABLET ORAL EVERY 8 HOURS PRN
Qty: 46 TABLET | Refills: 0 | Status: SHIPPED | OUTPATIENT
Start: 2022-11-09 | End: 2022-12-06 | Stop reason: SDUPTHER

## 2022-11-09 RX ORDER — ZOLPIDEM TARTRATE 10 MG/1
10 TABLET ORAL NIGHTLY PRN
Qty: 30 TABLET | Refills: 0 | Status: SHIPPED | OUTPATIENT
Start: 2022-11-09 | End: 2022-12-06 | Stop reason: SDUPTHER

## 2022-11-09 NOTE — TELEPHONE ENCOUNTER
Rx Refill Note  Requested Prescriptions     Pending Prescriptions Disp Refills   • diazePAM (VALIUM) 10 MG tablet 30 tablet 0     Sig: Take 1 tablet by mouth At Night As Needed for Anxiety.   • promethazine (PHENERGAN) 25 MG tablet 46 tablet 0     Sig: Take 1 tablet by mouth Every 8 (Eight) Hours As Needed for Nausea or Vomiting.   • zolpidem (AMBIEN) 10 MG tablet 30 tablet 0     Sig: Take 1 tablet by mouth At Night As Needed for Sleep.      Last office visit with prescribing clinician: 8/22/2022      Next office visit with prescribing clinician: 11/16/2022     Office Visit with Aditi Agarwal DO (08/22/2022)  SCANNED - LABS (05/20/2022)         Antony Rivero  11/09/22, 11:04 EST

## 2022-11-14 ENCOUNTER — TELEPHONE (OUTPATIENT)
Dept: FAMILY MEDICINE CLINIC | Facility: CLINIC | Age: 82
End: 2022-11-14

## 2022-11-14 DIAGNOSIS — K59.1 FUNCTIONAL DIARRHEA: Primary | ICD-10-CM

## 2022-11-14 RX ORDER — DIPHENOXYLATE HYDROCHLORIDE AND ATROPINE SULFATE 2.5; .025 MG/1; MG/1
1 TABLET ORAL 4 TIMES DAILY PRN
Qty: 30 TABLET | Refills: 0 | Status: SHIPPED | OUTPATIENT
Start: 2022-11-14 | End: 2022-12-06 | Stop reason: SDUPTHER

## 2022-11-14 NOTE — TELEPHONE ENCOUNTER
Caller: Karina Saleem    Relationship: Self    Best call back number: [phone]    Requested Prescriptions: DIARRHEA MEDICATION       Pharmacy where request should be sent:    [facility] - [place], [place] - [address] - [phone]  - [phone]   [phone]  Additional details provided by patient: PATIENT STATES SHE HAS DIARRHEA ON AND OFF SINCE LAST WEEK.  SHE STATES SHE IS TAKING OVER THE COUNTER MEDICATIONS BUT THEY ARE NOT HELPING.  SHE IS WANTING TO KNOW IF DR HAMILTON WOULD BE WILLING TO SEND IN A PRESCRIPTION TO HELP.    Does the patient have less than a 3 day supply:  [x] Yes  [] No    Mirella Buck, RegSched Rep   11/14/22 15:42 EST     PLEASE ADVISE.

## 2022-11-14 NOTE — TELEPHONE ENCOUNTER
Incoming Refill Request      Medication requested (name and dose): Magic Butt Cream (do not see on active med list)    Pharmacy where request should be sent:  [facility]    Additional details provided by patient: n/a    Best call back number:     Does the patient have less than a 3 day supply:  [] Yes  [x] No    Haritha Beasley, RegSched Rep  11/14/22, 10:38 EST

## 2022-11-14 NOTE — TELEPHONE ENCOUNTER
Pt did not request this.     She did state that so far, they've been unable to get transportation for her wed appt. She will call the day of and let us know for sure.

## 2022-11-16 NOTE — TELEPHONE ENCOUNTER
HUB to read    I called [facility] pharmacy and they said they delivered the patient the Lomotil.

## 2022-12-01 DIAGNOSIS — G14 POST POLIOMYELITIS SYNDROME: ICD-10-CM

## 2022-12-01 NOTE — TELEPHONE ENCOUNTER
Caller: TioraymondKarina    Relationship: Self    Best call back number: [phone]    Requested Prescriptions:   Requested Prescriptions     Pending Prescriptions Disp Refills   • HYDROcodone-acetaminophen (Norco) 7.5-325 MG per tablet 30 tablet 0     Sig: Take 1 tablet by mouth Daily As Needed for Moderate Pain.   • fentaNYL (DURAGESIC) 100 MCG/HR patch 10 patch 0     Sig: Place 1 patch on the skin as directed by provider Every 72 (Seventy-Two) Hours.        Pharmacy where request should be sent: [address] [phone] [facility] [phone] FX     Does the patient have less than a 3 day supply:  [] Yes  [x] No    Would you like a call back once the refill request has been completed: [x] Yes [] No    If the office needs to give you a call back, can they leave a voicemail: [x] Yes [] No    Suri Beltre, Andrzej Rep   12/01/22 14:37 EST

## 2022-12-02 RX ORDER — HYDROCODONE BITARTRATE AND ACETAMINOPHEN 7.5; 325 MG/1; MG/1
1 TABLET ORAL DAILY PRN
Qty: 30 TABLET | Refills: 0 | Status: SHIPPED | OUTPATIENT
Start: 2022-12-02 | End: 2023-01-05 | Stop reason: SDUPTHER

## 2022-12-02 RX ORDER — FENTANYL 100 UG/H
1 PATCH TRANSDERMAL
Qty: 10 PATCH | Refills: 0 | Status: SHIPPED | OUTPATIENT
Start: 2022-12-02 | End: 2023-01-05 | Stop reason: SDUPTHER

## 2022-12-06 DIAGNOSIS — F41.9 ANXIETY: ICD-10-CM

## 2022-12-06 DIAGNOSIS — K59.1 FUNCTIONAL DIARRHEA: ICD-10-CM

## 2022-12-06 DIAGNOSIS — F51.01 PRIMARY INSOMNIA: ICD-10-CM

## 2022-12-06 NOTE — TELEPHONE ENCOUNTER
Incoming Refill Request      Medication requested (name and dose): Diazepam 10mg  Lomotil 2.5-0.025mg  Phenergan 25mg  Ambien 10mg    [facility]    Additional details provided by patient: n/a    Best call back number:     Does the patient have less than a 3 day supply:  [] Yes  [x] No    Haritha Beasley, RegSched Rep  12/06/22, 15:34 EST

## 2022-12-07 RX ORDER — DIAZEPAM 10 MG/1
10 TABLET ORAL NIGHTLY PRN
Qty: 30 TABLET | Refills: 0 | Status: SHIPPED | OUTPATIENT
Start: 2022-12-07 | End: 2023-01-06

## 2022-12-07 RX ORDER — ZOLPIDEM TARTRATE 10 MG/1
10 TABLET ORAL NIGHTLY PRN
Qty: 30 TABLET | Refills: 0 | Status: SHIPPED | OUTPATIENT
Start: 2022-12-07 | End: 2023-01-06

## 2022-12-07 RX ORDER — PROMETHAZINE HYDROCHLORIDE 25 MG/1
25 TABLET ORAL EVERY 8 HOURS PRN
Qty: 46 TABLET | Refills: 0 | Status: SHIPPED | OUTPATIENT
Start: 2022-12-07 | End: 2022-12-28

## 2022-12-07 RX ORDER — DIPHENOXYLATE HYDROCHLORIDE AND ATROPINE SULFATE 2.5; .025 MG/1; MG/1
1 TABLET ORAL 4 TIMES DAILY PRN
Qty: 30 TABLET | Refills: 0 | Status: SHIPPED | OUTPATIENT
Start: 2022-12-07 | End: 2022-12-27 | Stop reason: SDUPTHER

## 2022-12-16 ENCOUNTER — TELEPHONE (OUTPATIENT)
Dept: FAMILY MEDICINE CLINIC | Facility: CLINIC | Age: 82
End: 2022-12-16

## 2022-12-16 NOTE — TELEPHONE ENCOUNTER
Caller: Karina Saleem    Relationship: Self    Best call back number: [phone]    What is the best time to reach you: ANY    Who are you requesting to speak with (clinical staff, provider,  specific staff member): TUSHAR    What was the call regarding: PATIENT IS REQUESTING TUSHAR RETURN HER CALL TO DISCUSS APPOINTMENT    Do you require a callback: YES

## 2022-12-27 DIAGNOSIS — K59.1 FUNCTIONAL DIARRHEA: ICD-10-CM

## 2022-12-27 RX ORDER — OMEPRAZOLE 40 MG/1
40 CAPSULE, DELAYED RELEASE ORAL DAILY
Qty: 90 CAPSULE | Refills: 0 | Status: SHIPPED | OUTPATIENT
Start: 2022-12-27 | End: 2023-02-13

## 2022-12-27 RX ORDER — DIPHENOXYLATE HYDROCHLORIDE AND ATROPINE SULFATE 2.5; .025 MG/1; MG/1
1 TABLET ORAL 4 TIMES DAILY PRN
Qty: 30 TABLET | Refills: 0 | Status: SHIPPED | OUTPATIENT
Start: 2022-12-27

## 2022-12-27 NOTE — TELEPHONE ENCOUNTER
Incoming Refill Request      Medication requested (name and dose): Diphenoxylate-Atropine 2.5-0.025mg  Omeprazole 40mg (do not see on med list)    Pharmacy where request should be sent:  [facility]    Additional details provided by patient: n/a    Best call back number:     Does the patient have less than a 3 day supply:  [] Yes  [x] No    Haritha Beasley, [facility] Center[facility] Rep  12/27/22, 09:54 EST

## 2022-12-28 RX ORDER — PROMETHAZINE HYDROCHLORIDE 25 MG/1
TABLET ORAL
Qty: 46 TABLET | Refills: 0 | Status: SHIPPED | OUTPATIENT
Start: 2022-12-28 | End: 2023-02-16

## 2022-12-29 ENCOUNTER — TELEPHONE (OUTPATIENT)
Dept: FAMILY MEDICINE CLINIC | Facility: CLINIC | Age: 82
End: 2022-12-29

## 2023-01-05 DIAGNOSIS — F41.9 ANXIETY: ICD-10-CM

## 2023-01-05 DIAGNOSIS — F51.01 PRIMARY INSOMNIA: ICD-10-CM

## 2023-01-05 DIAGNOSIS — G14 POST POLIOMYELITIS SYNDROME: ICD-10-CM

## 2023-01-05 NOTE — TELEPHONE ENCOUNTER
INSPECT RAN    Rx Refill Note  Requested Prescriptions     Pending Prescriptions Disp Refills   • zolpidem (AMBIEN) 10 MG tablet [Pharmacy Med Name: zolpidem 10 mg tablet] 30 tablet 0     Sig: TAKE ONE TABLET BY MOUTH NIGHTLY AS NEEDED FOR SLEEP   • diazePAM (VALIUM) 10 MG tablet [Pharmacy Med Name: diazepam 10 mg tablet] 30 tablet 0     Sig: TAKE ONE TABLET BY MOUTH NIGHTLY AS NEEDED FOR ANXIETY      Last office visit with prescribing clinician: 8/22/2022   Last telemedicine visit with prescribing clinician: 1/6/2023   Next office visit with prescribing clinician: 1/6/2023                       SCANNED - LABS (05/20/2022)    Would you like a call back once the refill request has been completed: [] Yes [] No    If the office needs to give you a call back, can they leave a voicemail: [] Yes [] No    Sharron Rowe, RT  01/05/23, 10:16 EST

## 2023-01-05 NOTE — TELEPHONE ENCOUNTER
Caller: JayynatanaelNoelle andraderaine    Relationship: Self    Best call back number: [phone]     Requested Prescriptions:   Requested Prescriptions     Pending Prescriptions Disp Refills   • fentaNYL (DURAGESIC) 100 MCG/HR patch 10 patch 0     Sig: Place 1 patch on the skin as directed by provider Every 72 (Seventy-Two) Hours.   • HYDROcodone-acetaminophen (Norco) 7.5-325 MG per tablet 30 tablet 0     Sig: Take 1 tablet by mouth Daily As Needed for Moderate Pain.        Pharmacy where request should be sent: [address] - [phone] [facility] [phone] FX     Additional details provided by patient: MEDICATION IS JUST ABOUT OUT    Does the patient have less than a 3 day supply:  [x] Yes  [] No    Would you like a call back once the refill request has been completed: [x] Yes [] No    If the office needs to give you a call back, can they leave a voicemail: [] Yes [x] No    EFREM Paez   01/05/23 09:44 EST

## 2023-01-06 RX ORDER — FENTANYL 100 UG/H
1 PATCH TRANSDERMAL
Qty: 10 PATCH | Refills: 0 | Status: SHIPPED | OUTPATIENT
Start: 2023-01-06 | End: 2023-02-07 | Stop reason: SDUPTHER

## 2023-01-06 RX ORDER — ZOLPIDEM TARTRATE 10 MG/1
TABLET ORAL
Qty: 30 TABLET | Refills: 0 | Status: SHIPPED | OUTPATIENT
Start: 2023-01-06 | End: 2023-02-03 | Stop reason: SDUPTHER

## 2023-01-06 RX ORDER — DIAZEPAM 10 MG/1
TABLET ORAL
Qty: 30 TABLET | Refills: 0 | Status: SHIPPED | OUTPATIENT
Start: 2023-01-06 | End: 2023-02-03 | Stop reason: SDUPTHER

## 2023-01-06 RX ORDER — HYDROCODONE BITARTRATE AND ACETAMINOPHEN 7.5; 325 MG/1; MG/1
1 TABLET ORAL DAILY PRN
Qty: 30 TABLET | Refills: 0 | Status: SHIPPED | OUTPATIENT
Start: 2023-01-06 | End: 2023-02-13 | Stop reason: SDUPTHER

## 2023-01-06 NOTE — TELEPHONE ENCOUNTER
Caller: Karina Saleem    Relationship: Self    Best call back number: [phone]    What was the call regarding: PATIENT IS OUT OF MEDICATION, PLEASE SEND IN TODAY IF POSSIBLE.     Do you require a callback: YES, PLEASE ADVISE WHEN SENT IN

## 2023-01-06 NOTE — TELEPHONE ENCOUNTER
Caller: Karina Saleem    Relationship: Self    Best call back number: [phone]    What was the call regarding: PATIENT CALLED TO CHECK ON STATUS OF HER REQUEST.

## 2023-01-21 ENCOUNTER — DOCUMENTATION (OUTPATIENT)
Dept: FAMILY MEDICINE CLINIC | Facility: CLINIC | Age: 83
End: 2023-01-21
Payer: MEDICARE

## 2023-01-21 DIAGNOSIS — R30.0 DYSURIA: Primary | ICD-10-CM

## 2023-01-21 RX ORDER — NITROFURANTOIN 25; 75 MG/1; MG/1
100 CAPSULE ORAL 2 TIMES DAILY
Qty: 10 CAPSULE | Refills: 0 | Status: SHIPPED | OUTPATIENT
Start: 2023-01-21 | End: 2023-01-29

## 2023-01-22 NOTE — PROGRESS NOTES
Pt called with c/o urianry pain with voiding. No fevers, abdominal pain. Denies other symptoms. Will TX with macrobid. Pt normally goes to [facility] but requested [facility]. Advised to see PCP Monday and if any changes or severe pain throughout the evening to go to [facility] ER

## 2023-01-25 ENCOUNTER — TELEPHONE (OUTPATIENT)
Dept: FAMILY MEDICINE CLINIC | Facility: CLINIC | Age: 83
End: 2023-01-25

## 2023-01-25 NOTE — TELEPHONE ENCOUNTER
Caller: Karina Saleem    Relationship: Self    Best call back number: [phone]    What medication are you requesting: RELIEF    What are your current symptoms: DIARRHEA    How long have you been experiencing symptoms:  01/24/23    Have you had these symptoms before:    [] Yes  [x] No    Have you been treated for these symptoms before:   [] Yes  [x] No    If a prescription is needed, what is your preferred pharmacy and phone number: [address] [phone]  - [phone]      Additional notes: PATIENT STATES SHE WAS GIVEN MEDICATION FOR A URINARY TRACT INFECTION, WHICH SEEMS TO HELP HER SYMPTOMS. NOW SHE IS HAVING ISSUES WITH DIARRHEA.     PATIENT IS NOT FOR SURE IF THERE IS SOMETHING THAT CAN BE CALLED IN TO HELP THIS ISSUE OR WHAT ELSE SHE CAN DO.     PLEASE ADVISE.

## 2023-01-26 ENCOUNTER — TELEPHONE (OUTPATIENT)
Dept: FAMILY MEDICINE CLINIC | Facility: CLINIC | Age: 83
End: 2023-01-26
Payer: MEDICARE

## 2023-01-26 NOTE — TELEPHONE ENCOUNTER
Caller: Karina Saleem    Relationship: Self    Best call back number:[phone] (Home)    Requested Prescriptions:   Requested Prescriptions      No prescriptions requested or ordered in this encounter    BUTT CREAM- NOT IN MED LIST, PATIENT STATES HAS BEEN SOME TIME SINCE LAST FILLED    Pharmacy where request should be sent: [facility] - [place], [place] - [address] - [phone]  - [phone] FX     Additional details provided by patient: PATIENT STATES SHE USES THIS FOR SORE ON SIDE AND NEEDS THIS FILLED ASAP, PLEASE ADVISE PATIENT WHEN SENT TO PHARMACY ASAP    Does the patient have less than a 3 day supply:  [x] Yes  [] No    Would you like a call back once the refill request has been completed: [x] Yes [] No    If the office needs to give you a call back, can they leave a voicemail: [x] Yes [] No    Andrzej Jacobs Rep   01/26/23 10:04 EST

## 2023-01-29 ENCOUNTER — TELEPHONE (OUTPATIENT)
Dept: FAMILY MEDICINE CLINIC | Facility: CLINIC | Age: 83
End: 2023-01-29
Payer: MEDICARE

## 2023-01-29 DIAGNOSIS — R30.0 DYSURIA: Primary | ICD-10-CM

## 2023-01-29 RX ORDER — SULFAMETHOXAZOLE AND TRIMETHOPRIM 800; 160 MG/1; MG/1
1 TABLET ORAL 2 TIMES DAILY
Qty: 14 TABLET | Refills: 0 | Status: SHIPPED | OUTPATIENT
Start: 2023-01-29 | End: 2023-02-05

## 2023-01-29 NOTE — TELEPHONE ENCOUNTER
Pt called with symptoms of dysuria that started last night.  She was recently treated for UTI with macrobid twice daily x 5 days.  She was feeling better and then symptoms started again last night.  She denies any fevers or back pain different than her usual back pain.  Discussed that I would send antibiotics but she needs to follow up with PCP if not improving or if symptoms return after finishing antibiotic.  Also discussed ER precautions.  She voiced understanding.

## 2023-01-30 ENCOUNTER — TELEPHONE (OUTPATIENT)
Dept: FAMILY MEDICINE CLINIC | Facility: CLINIC | Age: 83
End: 2023-01-30

## 2023-01-30 NOTE — TELEPHONE ENCOUNTER
Caller: Karina Saleem    Relationship: Self    Best call back number: [phone]    What is the best time to reach you: ANYTIME    Who are you requesting to speak with (clinical staff, provider,  specific staff member): CLINICAL    What was the call regarding: PATIENT STATED SHE WAS PRESCRIBED AN ANTIBIOTIC FOR A URINARY TRACT INFECTION 2 WEEKS AGO, AND FINISHED THIS MEDICATION.    PATIENT STATED HER SYMPTOMS HAVE RETURNED AND SHE WAS PRESCRIBED ANOTHER ROUND OF ANTIBIOTICS ON 01-.    PATIENT IS REQUESTING TO KNOW WHY HER SYMPTOMS HAVE RETURNED.    Do you require a callback: PLEASE CALL TO DISCUSS AND ADVISE.

## 2023-01-30 NOTE — TELEPHONE ENCOUNTER
HUB to share    Aditi Agarwal, DO         She is getting antibiotics w/o anyone doing cultures------so no idea if she is getting the correct med; she will need to follow-up on this w/ a f/u UA and poss cx when done w/ antibiotics

## 2023-01-31 NOTE — TELEPHONE ENCOUNTER
Pt informed and says this last abx seems to be doing a lot better. Informed that she would need to get UA rech after finishing abx, to make sure infection cleared. Pt verbalized understanding.

## 2023-02-02 DIAGNOSIS — F41.9 ANXIETY: ICD-10-CM

## 2023-02-02 DIAGNOSIS — F51.01 PRIMARY INSOMNIA: ICD-10-CM

## 2023-02-02 RX ORDER — PROMETHAZINE HYDROCHLORIDE 25 MG/1
25 TABLET ORAL EVERY 8 HOURS PRN
Qty: 46 TABLET | Refills: 0 | Status: CANCELLED | OUTPATIENT
Start: 2023-02-02

## 2023-02-02 RX ORDER — ZOLPIDEM TARTRATE 10 MG/1
10 TABLET ORAL NIGHTLY PRN
Qty: 30 TABLET | Refills: 0 | Status: CANCELLED | OUTPATIENT
Start: 2023-02-02

## 2023-02-02 RX ORDER — DIAZEPAM 10 MG/1
10 TABLET ORAL NIGHTLY PRN
Qty: 30 TABLET | Refills: 0 | Status: CANCELLED | OUTPATIENT
Start: 2023-02-02

## 2023-02-02 NOTE — TELEPHONE ENCOUNTER
Caller: [facility] - [place], [place] - [address] - [phone] [facility] [phone] FX    Relationship: Pharmacy    Best call back number: [phone]   Requested Prescriptions:   Requested Prescriptions     Pending Prescriptions Disp Refills   • promethazine (PHENERGAN) 25 MG tablet 46 tablet 0     Sig: Take 1 tablet by mouth Every 8 (Eight) Hours As Needed for Nausea or Vomiting.   • diazePAM (VALIUM) 10 MG tablet 30 tablet 0     Sig: Take 1 tablet by mouth At Night As Needed for Anxiety.   • zolpidem (AMBIEN) 10 MG tablet 30 tablet 0     Sig: Take 1 tablet by mouth At Night As Needed for Sleep.        Pharmacy where request should be sent: [facility] [place] [facility] [address] [place] - [phone]  - [phone] FX     Additional details provided by patient: WILL BE OUT OF MEDICATION TONIGHT    Does the patient have less than a 3 day supply:  [x] Yes  [] No    Would you like a call back once the refill request has been completed: [x] Yes [] No    If the office needs to give you a call back, can they leave a voicemail: [] Yes [x] No    Barber Daniel   02/02/23 15:08 EST

## 2023-02-02 NOTE — TELEPHONE ENCOUNTER
Incoming Refill Request      Medication requested (name and dose): Diazepam 10mg  Promethazine 25mg  Zolpidem 10mg    [facility]    Additional details provided by patient: n/a    Best call back number:     Does the patient have less than a 3 day supply:  [] Yes  [x] No    Haritha Beasley, RegSched Rep  02/02/23, 09:25 EST

## 2023-02-02 NOTE — TELEPHONE ENCOUNTER
HUB to share    Rufina Armijo APRN       I saw patient will be seeing Dr. Agarwal on 2/13/2023. She will have to get her medications re-filled by Dr. Agarwal once re-evaluated by Dr. Agarwal.

## 2023-02-03 DIAGNOSIS — F41.9 ANXIETY: ICD-10-CM

## 2023-02-03 DIAGNOSIS — F51.01 PRIMARY INSOMNIA: ICD-10-CM

## 2023-02-03 RX ORDER — ZOLPIDEM TARTRATE 10 MG/1
10 TABLET ORAL NIGHTLY PRN
Qty: 10 TABLET | Refills: 0 | Status: SHIPPED | OUTPATIENT
Start: 2023-02-03 | End: 2023-02-14

## 2023-02-03 RX ORDER — DIAZEPAM 10 MG/1
10 TABLET ORAL NIGHTLY PRN
Qty: 10 TABLET | Refills: 0 | Status: SHIPPED | OUTPATIENT
Start: 2023-02-03 | End: 2023-02-14

## 2023-02-03 NOTE — TELEPHONE ENCOUNTER
Pt called requesting status on refills.   staff informed pt that refills would not be filled until seen at appt on 2/13/23    Pt asking if she can have enough to get through until appointment on 2/13.

## 2023-02-07 DIAGNOSIS — G14 POST POLIOMYELITIS SYNDROME: ICD-10-CM

## 2023-02-07 RX ORDER — HYDROCODONE BITARTRATE AND ACETAMINOPHEN 7.5; 325 MG/1; MG/1
1 TABLET ORAL DAILY PRN
Qty: 30 TABLET | Refills: 0 | OUTPATIENT
Start: 2023-02-07

## 2023-02-07 RX ORDER — FENTANYL 100 UG/H
1 PATCH TRANSDERMAL
Qty: 10 PATCH | Refills: 0 | Status: SHIPPED | OUTPATIENT
Start: 2023-02-07 | End: 2023-02-08 | Stop reason: SDUPTHER

## 2023-02-07 NOTE — TELEPHONE ENCOUNTER
Antony Rivero     AS     9:54 AM 02/03/23  Note    Pt called requesting status on refills.   staff informed pt that refills would not be filled until seen at appt on 2/13/23     Pt asking if she can have enough to get through until appointment on 2/13.

## 2023-02-07 NOTE — TELEPHONE ENCOUNTER
Caller: Karina Saleem    Relationship: Self    Best call back number: [phone]    Requested Prescriptions:   Requested Prescriptions     Pending Prescriptions Disp Refills   • HYDROcodone-acetaminophen (Norco) 7.5-325 MG per tablet 30 tablet 0     Sig: Take 1 tablet by mouth Daily As Needed for Moderate Pain.        Pharmacy where request should be sent: [address] [phone] [facility] [phone] FX     Does the patient have less than a 3 day supply:  [] Yes  [x] No    Would you like a call back once the refill request has been completed: [] Yes [x] No    If the office needs to give you a call back, can they leave a voicemail: [] Yes [x] No    Andrzej Reilly Rep   02/07/23 10:31 EST

## 2023-02-08 ENCOUNTER — TELEPHONE (OUTPATIENT)
Dept: FAMILY MEDICINE CLINIC | Facility: CLINIC | Age: 83
End: 2023-02-08

## 2023-02-08 DIAGNOSIS — G14 POST POLIOMYELITIS SYNDROME: ICD-10-CM

## 2023-02-08 RX ORDER — FENTANYL 100 UG/H
1 PATCH TRANSDERMAL
Qty: 10 PATCH | Refills: 0 | Status: SHIPPED | OUTPATIENT
Start: 2023-02-08 | End: 2023-02-09 | Stop reason: SDUPTHER

## 2023-02-08 NOTE — TELEPHONE ENCOUNTER
[facility] said that somehow Norco 5mg rx was sent to them from [facility] when the pt was in the hosp back in dec. Pt told them that was a smaller dose than Dr Agarwal gives her. The pharm discovered that it was an old rx and discontinued the med. Pt was informed and also reminded that Dr Agarwal will only fill her Norco 7.5mg when she sees her at next appt, due to many cancellations. She is aware the pain patches were sent to  in the meantime.    Bed in lowest position, wheels locked, appropriate side rails in place/Call bell, personal items and telephone in reach/Instruct patient to call for assistance before getting out of bed or chair/Non-slip footwear when patient is out of bed/[place]el to call system/Physically safe environment - no spills, clutter or unnecessary equipment/Purposeful Proactive Rounding/Room/bathroom lighting operational, light cord in reach

## 2023-02-08 NOTE — TELEPHONE ENCOUNTER
Caller: Karina Saleem    Relationship to patient: Self    Best call back number: [phone]    Patient is needing: PATIENT CALLED TO SEE WHY HER HYDROCODONE HAS NOT BEEN REFILLED    HUB ADVISED IT APPEARED THAT DR. CARRERO WAS WANTING TO WAIT UNTIL SHE SEES HER ON Monday TO GET THE HYDROCODONE REFILLED    SHE SAID THAT SHE IS NOT SURE WHY A DOCTOR FROM THE HOSPITAL WAS DISCUSSING HER HYDROCODONE WITH DR. CARRERO BUT THAT SHE NORMALLY WEARS THE PATCH THREE DAYS AND THEN TAKES THE HYDROCODONE AT NIGHT     REQUESTED CALLBACK

## 2023-02-08 NOTE — TELEPHONE ENCOUNTER
Pharmacy Name: [facility], [place] - [address] - [phone]  - [phone] FX     Pharmacy representative phone number: [phone]    What medication are you calling in regards to: fentaNYL (DURAGESIC) 100 MCG/HR patch    What question does the pharmacy have: PHARMACY IS OUT OF THIS MEDICATION AND THEY CAN NOT ORDER THEM.     Who is the provider that prescribed the medication: DR. CARRERO

## 2023-02-08 NOTE — TELEPHONE ENCOUNTER
Caller: Karina Saleem    Relationship: Self    Best call back number: [phone]    Who are you requesting to speak with (clinical staff, provider,  specific staff member): DR CARRERO, CLINICAL    What was the call regarding: PATIENT IS REQUESTING A CALL ASAP TO DISCUSS HER MEDICATIONS, AS A DIFFERENT PROVIDER HAS CALLED IN HER MEDICATIONS AND SHE IS CONCERNED ABOUT THIS.    Do you require a callback: PLEASE CALL ASAP

## 2023-02-08 NOTE — TELEPHONE ENCOUNTER
HUB to share    Where does she want to get the patches since [facility] not have them? Which pharm?

## 2023-02-08 NOTE — TELEPHONE ENCOUNTER
Caller: Karina Saleem    Relationship: Self    Best call back number: [phone]    What was the call regarding:PATIENT STATED THAT HER CURRENT PHARMACY CANNOT OBTAIN THE FENTANYL 100 MCG/HR PATCH.      PLEASE CHANGE PHARMACIES.      Do you require a callback: YES

## 2023-02-08 NOTE — TELEPHONE ENCOUNTER
Caller: Karina Saleem    Relationship to patient: Self    Best call back number: [phone]    Patient is needing: PATIENT STATES THAT [facility] IS OUT OF THE fentaNYL (DURAGESIC) 100 MCG/HR patch UNTIL MARCH. PATIENT IS REQUESTING TO KNOW WHAT OTHER OPTIONS SHE HAS. PLEASE ADVISE.

## 2023-02-09 ENCOUNTER — TELEPHONE (OUTPATIENT)
Dept: FAMILY MEDICINE CLINIC | Facility: CLINIC | Age: 83
End: 2023-02-09

## 2023-02-09 DIAGNOSIS — G14 POST POLIOMYELITIS SYNDROME: ICD-10-CM

## 2023-02-09 RX ORDER — FENTANYL 100 UG/H
1 PATCH TRANSDERMAL
Qty: 10 PATCH | Refills: 0 | Status: SHIPPED | OUTPATIENT
Start: 2023-02-09 | End: 2023-03-20 | Stop reason: SDUPTHER

## 2023-02-09 NOTE — TELEPHONE ENCOUNTER
[facility] in [place] only has 10 patches left at the moment p/ Hugh. They do not deliver this med.

## 2023-02-09 NOTE — TELEPHONE ENCOUNTER
Caller: Leo Saleem    Relationship: Self    Best call back number: [phone]     What is the best time to reach you: ANYTIME     Who are you requesting to speak with (clinical staff, provider,  specific staff member): CLINICAL     Do you know the name of the person who called:LEO     What was the call regarding: LEO SAYS SHE WAS TOLD SHE CAN NOT GET HER FENTANYL PATCH REFILLED UNTIL MARCH 2023, SHE SAYS SHE GOT HER LAST REFILL AT [facility] 1/6/2023. SHE WOULD LIKE A CALL BACK EXPLAINING WHY .     Do you require a callback:YES

## 2023-02-09 NOTE — TELEPHONE ENCOUNTER
See other phone note    You  Lexiot, Aditi, DO Just now (9:58 AM)     DS  [facility] [place] only has 10 patches left at the moment p/ Hugh. They do not deliver this med.

## 2023-02-09 NOTE — TELEPHONE ENCOUNTER
Pt has already been informed that the patches are on b/o until March according to both pharms, and that you will not fill her norco until seen in office, due to her many cancellations. Not sure what else to tell her. Please advise.

## 2023-02-13 ENCOUNTER — OFFICE VISIT (OUTPATIENT)
Dept: FAMILY MEDICINE CLINIC | Facility: CLINIC | Age: 83
End: 2023-02-13
Payer: MEDICARE

## 2023-02-13 VITALS
OXYGEN SATURATION: 92 % | RESPIRATION RATE: 16 BRPM | SYSTOLIC BLOOD PRESSURE: 130 MMHG | HEART RATE: 83 BPM | DIASTOLIC BLOOD PRESSURE: 78 MMHG | TEMPERATURE: 98.2 F | HEIGHT: 62 IN

## 2023-02-13 DIAGNOSIS — Z12.31 VISIT FOR SCREENING MAMMOGRAM: ICD-10-CM

## 2023-02-13 DIAGNOSIS — M62.3 IMMOBILITY SYNDROME (PARAPLEGIC): ICD-10-CM

## 2023-02-13 DIAGNOSIS — L89.140: ICD-10-CM

## 2023-02-13 DIAGNOSIS — M41.9 KYPHOSCOLIOSIS: Chronic | ICD-10-CM

## 2023-02-13 DIAGNOSIS — E03.9 ACQUIRED HYPOTHYROIDISM: ICD-10-CM

## 2023-02-13 DIAGNOSIS — E55.9 VITAMIN D DEFICIENCY: ICD-10-CM

## 2023-02-13 DIAGNOSIS — I10 ESSENTIAL (PRIMARY) HYPERTENSION: ICD-10-CM

## 2023-02-13 DIAGNOSIS — G14 POST POLIOMYELITIS SYNDROME: Primary | ICD-10-CM

## 2023-02-13 PROCEDURE — 99214 OFFICE O/P EST MOD 30 MIN: CPT | Performed by: FAMILY MEDICINE

## 2023-02-13 RX ORDER — LEVOTHYROXINE SODIUM 0.2 MG/1
200 TABLET ORAL DAILY
Qty: 90 TABLET | Refills: 0 | Status: SHIPPED | OUTPATIENT
Start: 2023-02-13

## 2023-02-13 RX ORDER — GABAPENTIN 100 MG/1
100 CAPSULE ORAL 3 TIMES DAILY
Qty: 450 CAPSULE | Refills: 2 | Status: CANCELLED | OUTPATIENT
Start: 2023-02-13

## 2023-02-13 RX ORDER — HYDROCODONE BITARTRATE AND ACETAMINOPHEN 7.5; 325 MG/1; MG/1
1 TABLET ORAL DAILY PRN
Qty: 30 TABLET | Refills: 0 | Status: SHIPPED | OUTPATIENT
Start: 2023-02-13 | End: 2023-03-14 | Stop reason: SDUPTHER

## 2023-02-13 RX ORDER — GABAPENTIN 100 MG/1
100 CAPSULE ORAL NIGHTLY
Qty: 90 CAPSULE | Refills: 0 | Status: SHIPPED | OUTPATIENT
Start: 2023-02-13

## 2023-02-13 RX ORDER — LEVOTHYROXINE SODIUM 0.2 MG/1
200 TABLET ORAL DAILY
Qty: 30 TABLET | Refills: 0 | Status: CANCELLED | OUTPATIENT
Start: 2023-02-13

## 2023-02-13 RX ORDER — HYDROCODONE BITARTRATE AND ACETAMINOPHEN 7.5; 325 MG/1; MG/1
1 TABLET ORAL DAILY PRN
Qty: 30 TABLET | Refills: 0 | Status: CANCELLED | OUTPATIENT
Start: 2023-02-13

## 2023-02-14 DIAGNOSIS — F51.01 PRIMARY INSOMNIA: ICD-10-CM

## 2023-02-14 DIAGNOSIS — F41.9 ANXIETY: ICD-10-CM

## 2023-02-14 RX ORDER — ZOLPIDEM TARTRATE 10 MG/1
TABLET ORAL
Qty: 30 TABLET | Refills: 0 | Status: SHIPPED | OUTPATIENT
Start: 2023-02-14 | End: 2023-03-15

## 2023-02-14 RX ORDER — DIAZEPAM 10 MG/1
TABLET ORAL
Qty: 30 TABLET | Refills: 0 | Status: SHIPPED | OUTPATIENT
Start: 2023-02-14 | End: 2023-03-15

## 2023-02-15 DIAGNOSIS — F51.01 PRIMARY INSOMNIA: ICD-10-CM

## 2023-02-15 DIAGNOSIS — F41.9 ANXIETY: ICD-10-CM

## 2023-02-15 RX ORDER — DIAZEPAM 10 MG/1
TABLET ORAL
Qty: 30 TABLET | Refills: 0 | OUTPATIENT
Start: 2023-02-15

## 2023-02-15 RX ORDER — ZOLPIDEM TARTRATE 10 MG/1
10 TABLET ORAL
Qty: 30 TABLET | Refills: 0 | OUTPATIENT
Start: 2023-02-15

## 2023-02-15 NOTE — TELEPHONE ENCOUNTER
Caller: Karina Saleem    Relationship: Self    Best call back number: [phone]    Requested Prescriptions:   Requested Prescriptions     Pending Prescriptions Disp Refills   • diazePAM (VALIUM) 10 MG tablet 30 tablet 0   • zolpidem (AMBIEN) 10 MG tablet 30 tablet 0     Sig: Take 1 tablet by mouth every night at bedtime.        Pharmacy where request should be sent: [address] [phone] [facility] [phone] FX     Does the patient have less than a 3 day supply:  [x] Yes  [] No    Would you like a call back once the refill request has been completed: [] Yes [x] No    If the office needs to give you a call back, can they leave a voicemail: [] Yes [x] No    Andrzej Reilly Rep   02/15/23 08:06 EST

## 2023-02-16 RX ORDER — PROMETHAZINE HYDROCHLORIDE 25 MG/1
TABLET ORAL
Qty: 46 TABLET | Refills: 0 | Status: SHIPPED | OUTPATIENT
Start: 2023-02-16 | End: 2023-03-14 | Stop reason: SDUPTHER

## 2023-02-16 NOTE — TELEPHONE ENCOUNTER
Rx Refill Note  Requested Prescriptions     Pending Prescriptions Disp Refills   • promethazine (PHENERGAN) 25 MG tablet [Pharmacy Med Name: promethazine 25 mg tablet] 46 tablet 0     Sig: TAKE ONE TABLET BY MOUTH EVERY 8 HOURS AS NEEDED FOR NAUSEA AND VOMITING      Last office visit with prescribing clinician: 2/13/2023   Last telemedicine visit with prescribing clinician: 8/14/2023   Next office visit with prescribing clinician: 8/14/2023     SCANNED - LABS (05/20/2022)                      Would you like a call back once the refill request has been completed: [] Yes [] No    If the office needs to give you a call back, can they leave a voicemail: [] Yes [] No    Danay Boss, [facility]  02/16/23, 12:53 EST

## 2023-03-14 DIAGNOSIS — G14 POST POLIOMYELITIS SYNDROME: ICD-10-CM

## 2023-03-14 RX ORDER — PROMETHAZINE HYDROCHLORIDE 25 MG/1
25 TABLET ORAL EVERY 8 HOURS PRN
Qty: 46 TABLET | Refills: 0 | Status: SHIPPED | OUTPATIENT
Start: 2023-03-14

## 2023-03-14 RX ORDER — HYDROCODONE BITARTRATE AND ACETAMINOPHEN 7.5; 325 MG/1; MG/1
1 TABLET ORAL DAILY PRN
Qty: 30 TABLET | Refills: 0 | Status: SHIPPED | OUTPATIENT
Start: 2023-03-14

## 2023-03-14 NOTE — TELEPHONE ENCOUNTER
Caller: Maureen Saleemine    Relationship: Self    Best call back number: [phone]    Requested Prescriptions:   Requested Prescriptions     Pending Prescriptions Disp Refills   • HYDROcodone-acetaminophen (Norco) 7.5-325 MG per tablet 30 tablet 0     Sig: Take 1 tablet by mouth Daily As Needed for Moderate Pain.   • promethazine (PHENERGAN) 25 MG tablet 46 tablet 0     Sig: Take 1 tablet by mouth Every 8 (Eight) Hours As Needed for Nausea or Vomiting.        Pharmacy where request should be sent: [address] [phone] [facility] [phone]     Does the patient have less than a 3 day supply:  [x] Yes  [] No    Would you like a call back once the refill request has been completed: [x] Yes [] No    If the office needs to give you a call back, can they leave a voicemail: [x] Yes [] No    Andrzej Gan Rep   03/14/23 08:58 EDT

## 2023-03-15 DIAGNOSIS — F41.9 ANXIETY: ICD-10-CM

## 2023-03-15 DIAGNOSIS — F51.01 PRIMARY INSOMNIA: ICD-10-CM

## 2023-03-15 RX ORDER — DIAZEPAM 10 MG/1
TABLET ORAL
Qty: 30 TABLET | Refills: 0 | Status: SHIPPED | OUTPATIENT
Start: 2023-03-15

## 2023-03-15 RX ORDER — ZOLPIDEM TARTRATE 10 MG/1
TABLET ORAL
Qty: 30 TABLET | Refills: 0 | Status: SHIPPED | OUTPATIENT
Start: 2023-03-15

## 2023-03-15 NOTE — TELEPHONE ENCOUNTER
INSPECT RAN    Rx Refill Note  Requested Prescriptions     Pending Prescriptions Disp Refills   • diazePAM (VALIUM) 10 MG tablet [Pharmacy Med Name: diazepam 10 mg tablet] 30 tablet 0     Sig: TAKE ONE TABLET BY MOUTH AT night AS NEEDED   • zolpidem (AMBIEN) 10 MG tablet [Pharmacy Med Name: zolpidem 10 mg tablet] 30 tablet 0     Sig: TAKE ONE TABLET BY MOUTH AT BEDTIME      Last office visit with prescribing clinician: 2/13/2023   Last telemedicine visit with prescribing clinician: 8/14/2023   Next office visit with prescribing clinician: 8/14/2023                       SCANNED - LABS (05/20/2022)    Would you like a call back once the refill request has been completed: [] Yes [] No    If the office needs to give you a call back, can they leave a voicemail: [] Yes [] No    Sharron Rowe, RT  03/15/23, 09:43 EDT

## 2023-03-20 DIAGNOSIS — G14 POST POLIOMYELITIS SYNDROME: ICD-10-CM

## 2023-03-20 RX ORDER — FENTANYL 100 UG/H
1 PATCH TRANSDERMAL
Qty: 10 PATCH | Refills: 0 | Status: SHIPPED | OUTPATIENT
Start: 2023-03-20 | End: 2023-03-21 | Stop reason: SDUPTHER

## 2023-03-20 NOTE — TELEPHONE ENCOUNTER
Caller: Karina Saleem    Relationship: Self    Best call back number: [phone]    Requested Prescriptions:   Requested Prescriptions     Pending Prescriptions Disp Refills   • fentaNYL (DURAGESIC) 100 MCG/HR patch 10 patch 0     Sig: Place 1 patch on the skin as directed by provider Every 72 (Seventy-Two) Hours.        Pharmacy where request should be sent: [address] [phone] [facility] [phone]       Does the patient have less than a 3 day supply:  [x] Yes  [] No    Would you like a call back once the refill request has been completed: [] Yes [x] No    If the office needs to give you a call back, can they leave a voicemail: [] Yes [x] No    Andrzej Hamilton Rep   03/20/23 10:02 EDT

## 2023-03-21 DIAGNOSIS — F41.9 ANXIETY: ICD-10-CM

## 2023-03-21 DIAGNOSIS — G14 POST POLIOMYELITIS SYNDROME: ICD-10-CM

## 2023-03-21 DIAGNOSIS — F51.01 PRIMARY INSOMNIA: ICD-10-CM

## 2023-03-21 RX ORDER — ZOLPIDEM TARTRATE 10 MG/1
10 TABLET ORAL
Qty: 30 TABLET | Refills: 0 | Status: CANCELLED | OUTPATIENT
Start: 2023-03-21

## 2023-03-21 RX ORDER — FENTANYL 100 UG/H
1 PATCH TRANSDERMAL
Qty: 10 PATCH | Refills: 0 | Status: SHIPPED | OUTPATIENT
Start: 2023-03-21

## 2023-03-21 RX ORDER — DIAZEPAM 10 MG/1
TABLET ORAL
Qty: 30 TABLET | Refills: 0 | Status: CANCELLED | OUTPATIENT
Start: 2023-03-21

## 2023-03-21 NOTE — TELEPHONE ENCOUNTER
Caller: TioMaureen andradeine    Relationship: Self    Best call back number: [phone]     Requested Prescriptions:   Requested Prescriptions     Pending Prescriptions Disp Refills   • diazePAM (VALIUM) 10 MG tablet 30 tablet 0   • zolpidem (AMBIEN) 10 MG tablet 30 tablet 0     Sig: Take 1 tablet by mouth every night at bedtime.        Pharmacy where request should be sent: [address] [phone] [facility] [phone] FX     Last office visit with prescribing clinician: 2/13/2023   Last telemedicine visit with prescribing clinician: 8/14/2023   Next office visit with prescribing clinician: 8/14/2023     Additional details provided by patient: PATIENT ONLY RECEIVED TEN TABLETS ON BOTH OF THESE MEDICATIONS      SHE NORMALLY GETS A FULL MONTH SUPPLY     PLEASE GIVE HER A CALLBACK      Does the patient have less than a 3 day supply:  [x] Yes  [] No    Would you like a call back once the refill request has been completed: [x] Yes [] No    If the office needs to give you a call back, can they leave a voicemail: [x] Yes [] No    Irma Lema, EFREM   03/21/23 09:31 EDT

## 2023-03-21 NOTE — TELEPHONE ENCOUNTER
Patient called stating [facility] does not have the Fentanyl patch in stock and is requesting that the prescription be sent to [facility].

## 2023-03-22 NOTE — TELEPHONE ENCOUNTER
Pharm states not sure why it was filled that way, but they've already talked to pt today and she will be filled the rest on 4/3.

## 2023-04-11 DIAGNOSIS — F51.01 PRIMARY INSOMNIA: ICD-10-CM

## 2023-04-11 DIAGNOSIS — F41.9 ANXIETY: ICD-10-CM

## 2023-04-11 DIAGNOSIS — G14 POST POLIOMYELITIS SYNDROME: ICD-10-CM

## 2023-04-11 RX ORDER — DIAZEPAM 10 MG/1
10 TABLET ORAL NIGHTLY PRN
Qty: 30 TABLET | Refills: 0 | Status: SHIPPED | OUTPATIENT
Start: 2023-04-11

## 2023-04-11 RX ORDER — PROMETHAZINE HYDROCHLORIDE 25 MG/1
25 TABLET ORAL EVERY 8 HOURS PRN
Qty: 46 TABLET | Refills: 0 | Status: SHIPPED | OUTPATIENT
Start: 2023-04-11

## 2023-04-11 RX ORDER — ZOLPIDEM TARTRATE 10 MG/1
10 TABLET ORAL
Qty: 30 TABLET | Refills: 0 | Status: SHIPPED | OUTPATIENT
Start: 2023-04-11

## 2023-04-11 RX ORDER — HYDROCODONE BITARTRATE AND ACETAMINOPHEN 7.5; 325 MG/1; MG/1
1 TABLET ORAL DAILY PRN
Qty: 30 TABLET | Refills: 0 | Status: SHIPPED | OUTPATIENT
Start: 2023-04-11

## 2023-04-11 NOTE — TELEPHONE ENCOUNTER
Caller: Maureen Saleemine    Relationship: Self    Best call back number: [phone]      Requested Prescriptions:   Requested Prescriptions     Pending Prescriptions Disp Refills   • HYDROcodone-acetaminophen (Norco) 7.5-325 MG per tablet 30 tablet 0     Sig: Take 1 tablet by mouth Daily As Needed for Moderate Pain.   • zolpidem (AMBIEN) 10 MG tablet 30 tablet 0     Sig: Take 1 tablet by mouth every night at bedtime.   • diazePAM (VALIUM) 10 MG tablet 30 tablet 0   • promethazine (PHENERGAN) 25 MG tablet 46 tablet 0     Sig: Take 1 tablet by mouth Every 8 (Eight) Hours As Needed for Nausea or Vomiting.        Pharmacy where request should be sent: [address] [phone] [facility] [phone] FX     Last office visit with prescribing clinician: 2/13/2023   Last telemedicine visit with prescribing clinician: 8/14/2023   Next office visit with prescribing clinician: 8/14/2023     Does the patient have less than a 3 day supply:  [x] Yes  [] No    Would you like a call back once the refill request has been completed: [] Yes [] No    If the office needs to give you a call back, can they leave a voicemail: [] Yes [] No    Andrzej Gan Rep   04/11/23 08:19 EDT

## 2023-04-11 NOTE — TELEPHONE ENCOUNTER
Rx Refill Note  Requested Prescriptions     Pending Prescriptions Disp Refills   • HYDROcodone-acetaminophen (Norco) 7.5-325 MG per tablet 30 tablet 0     Sig: Take 1 tablet by mouth Daily As Needed for Moderate Pain.   • zolpidem (AMBIEN) 10 MG tablet 30 tablet 0     Sig: Take 1 tablet by mouth every night at bedtime.   • diazePAM (VALIUM) 10 MG tablet 30 tablet 0   • promethazine (PHENERGAN) 25 MG tablet 46 tablet 0     Sig: Take 1 tablet by mouth Every 8 (Eight) Hours As Needed for Nausea or Vomiting.      Last office visit with prescribing clinician: 2/13/2023   Last telemedicine visit with prescribing clinician: 8/14/2023   Next office visit with prescribing clinician: 8/14/2023     SCANNED - LABS (05/20/2022)                      Would you like a call back once the refill request has been completed: [] Yes [] No    If the office needs to give you a call back, can they leave a voicemail: [] Yes [] No    Danay Boss CMA  04/11/23, 09:36 EDT   INSPECT in chart

## 2023-04-13 DIAGNOSIS — G14 POST POLIOMYELITIS SYNDROME: ICD-10-CM

## 2023-04-13 NOTE — TELEPHONE ENCOUNTER
Incoming Refill Request      Medication requested (name and dose):    fentaNYL (DURAGESIC) 100 MCG/HR patch  1 patch, Every 72 Hours         Pharmacy where request should be sent: [facility]/pharmacy #[id number] - [place] - [address] [place] [address] - [phone]  - [phone]   [phone]    Additional details provided by patient: PATIENT NEEDS TO THIS AS SOON AS POSSIBLE     Best call back number: [phone]    Does the patient have less than a 3 day supply:  [x] Yes  [] No    Andrzej Espinal Rep  04/13/23, 14:39 EDT

## 2023-04-14 RX ORDER — FENTANYL 100 UG/H
1 PATCH TRANSDERMAL
Qty: 10 PATCH | Refills: 0 | Status: SHIPPED | OUTPATIENT
Start: 2023-04-14 | End: 2023-04-20 | Stop reason: SDUPTHER

## 2023-04-14 NOTE — TELEPHONE ENCOUNTER
PATIENT WANTED TO KNOW IF YOU COULD CALL THIS IN TO [facility] WHEN DUE     PLEASE CONFIRM WITH PATIENT     HUB READ INFO TO THE PATIENT AND SHE DISCONNECTED THE CALL

## 2023-04-14 NOTE — TELEPHONE ENCOUNTER
HUB RELAYED:    HUB to share     Not due until 4/21 - Dr Agarwal    NO FURTHER QUESTIONS AT THIS TIME. WILL CALL NEXT FRIDAY

## 2023-04-20 DIAGNOSIS — G14 POST POLIOMYELITIS SYNDROME: ICD-10-CM

## 2023-04-20 RX ORDER — FENTANYL 100 UG/H
1 PATCH TRANSDERMAL
Qty: 10 PATCH | Refills: 0 | Status: SHIPPED | OUTPATIENT
Start: 2023-04-20

## 2023-04-20 NOTE — TELEPHONE ENCOUNTER
Caller: Leo Saleem    Relationship: Self    Best call back number:   [phone]        Requested Prescriptions:   Requested Prescriptions     Pending Prescriptions Disp Refills   • fentaNYL (DURAGESIC) 100 MCG/HR patch 10 patch 0     Sig: Place 1 patch on the skin as directed by provider Every 72 (Seventy-Two) Hours.        Pharmacy where request should be sent:      [facility]/pharmacy #[id number] - [address] [phone] [place] [phone] FX  [phone]    Last office visit with prescribing clinician: 2/13/2023   Last telemedicine visit with prescribing clinician: 8/15/2023   Next office visit with prescribing clinician: 8/15/2023     Additional details provided by patient: LEO WOULD LIKE TO CHECK STATUS OF REFILL REQUEST     Does the patient have less than a 3 day supply:  [x] Yes  [] No    Would you like a call back once the refill request has been completed: [] Yes [] No    If the office needs to give you a call back, can they leave a voicemail: [] Yes [] No    Barber Daniel   04/20/23 15:46 EDT

## 2023-04-28 ENCOUNTER — TELEPHONE (OUTPATIENT)
Dept: FAMILY MEDICINE CLINIC | Facility: CLINIC | Age: 83
End: 2023-04-28

## 2023-04-28 NOTE — TELEPHONE ENCOUNTER
Caller: Karina Saleem    Relationship: Self    Best call back number: [phone]    What is the best time to reach you: ANY     Who are you requesting to speak with (clinical staff, provider,  specific staff member): DR CARRERO    Do you know the name of the person who called:     What was the call regarding: STATED SHE WENT TO THE ER 4/27/23 AND THEY MANJINDER BLOOD. TODAY THE PLACE THAT THE BLOOD WAS DRAWN FROM HAS A BUBBLE ON IT. SHE DOESN'T KNOW WHAT SHE SHOULD DO     Do you require a callback: ES        DELETE AFTER READING TO PATIENT: “ Thank you for sharing this information with me. I will send a message to the . Please allow up to 48 hours for the  to follow up on this request.”

## 2023-05-01 ENCOUNTER — TELEPHONE (OUTPATIENT)
Dept: FAMILY MEDICINE CLINIC | Facility: CLINIC | Age: 83
End: 2023-05-01

## 2023-05-01 NOTE — TELEPHONE ENCOUNTER
Caller: Karina Saleem    Relationship: Self    Best call back number: [phone]    What is the medical concern/diagnosis: SORE BACK AND BANDAGES NEED TO BE CHANGED EVERY COUPLE DAY     What specialty or service is being requested: HOME HEALTH     What is the provider, practice or medical service name: [facility]     Any additional details: PLEASE CALL TO LET PATIENT KNOW ONCE REFERRAL HAS BEEN PLACED.     PATIENT NEEDS THIS ASAP.

## 2023-05-02 ENCOUNTER — TELEPHONE (OUTPATIENT)
Dept: FAMILY MEDICINE CLINIC | Facility: CLINIC | Age: 83
End: 2023-05-02
Payer: MEDICARE

## 2023-05-02 ENCOUNTER — TELEPHONE (OUTPATIENT)
Dept: FAMILY MEDICINE CLINIC | Facility: CLINIC | Age: 83
End: 2023-05-02

## 2023-05-02 DIAGNOSIS — L89.140: Primary | ICD-10-CM

## 2023-05-02 NOTE — TELEPHONE ENCOUNTER
Caller: NIRALI    Relationship:     Best call back number:    NIRALI [phone]       What was the call regarding: PATIENT IS VERY WEAK AND KEEPS FALLING OUT OF HER WHEELCHAIR.     EMT ARE THERE TAKING HER TO THE HOSPITAL AGAIN     THEY WANTED TO KNOW IF SHE COULD BE ADMITTED TO THE HOSPITAL AND SEE WHAT'S GOING ON WITH HER       Do you require a callback: *YES PLEASE

## 2023-05-02 NOTE — TELEPHONE ENCOUNTER
HUB to share    Referral was sent to [facility] today. They should be calling pt to setup.    Attempted to call pt - RNA, no VM option

## 2023-05-03 NOTE — TELEPHONE ENCOUNTER
Rosie in reference to Karina Saleem notified and voiced comprehension and understanding that it is up to the attending ED provider to make the call if the patient needs to be admitted to the hospital. Rosie did say that the patient was admitted on 5-2-23.    Monica Samano MA

## 2023-05-08 ENCOUNTER — TELEPHONE (OUTPATIENT)
Dept: FAMILY MEDICINE CLINIC | Facility: CLINIC | Age: 83
End: 2023-05-08

## 2023-05-08 NOTE — TELEPHONE ENCOUNTER
[facility]    Relationship:     Best call back number: [phone] EXTENSION [phone]    What was the call regarding: [facility] WITH [facility] STATED THAT SHE IS CALLING REGARDING THE HOME HEALTH SKILLED NURSING REFERRAL. [facility] STATED THAT PATIENT IS CURRENTLY AT [facility] SINCE 05/05/2023 SO THEY CANNOT  PATIENT FOR HOME HEALTH SERVICES. [facility] STATED SHE SPOKE TO NURSE JUANCARLOS AT [facility] AND THERE IS NO DISCHARGE DATE YET.

## 2023-05-08 NOTE — TELEPHONE ENCOUNTER
Looks like you already ordered on on 5/2, but then the pt went to ER and admitted. Now, she's at [facility]. I think [facility] was justs just letting us know that they can't take pt while she is in rehab.

## 2023-06-16 NOTE — TELEPHONE ENCOUNTER
PATIENT STATES THAT WOUND CARE HAS TOLD HER THAT SHE NEEDS AN ANTIBIOTIC FOR THIS. PLEASE ADVISE.    normal (ped)...

## 2023-06-20 PROBLEM — R10.11 RIGHT UPPER QUADRANT ABDOMINAL PAIN: Status: ACTIVE | Noted: 2023-06-20

## 2023-06-21 ENCOUNTER — INPATIENT HOSPITAL (OUTPATIENT)
Dept: URBAN - METROPOLITAN AREA HOSPITAL 84 | Facility: HOSPITAL | Age: 83
End: 2023-06-21
Payer: MEDICAID

## 2023-06-21 DIAGNOSIS — R10.84 GENERALIZED ABDOMINAL PAIN: ICD-10-CM

## 2023-06-21 PROCEDURE — 99221 1ST HOSP IP/OBS SF/LOW 40: CPT | Mod: FS

## 2023-07-21 ENCOUNTER — TELEPHONE (OUTPATIENT)
Dept: FAMILY MEDICINE CLINIC | Facility: CLINIC | Age: 83
End: 2023-07-21

## 2023-07-21 NOTE — TELEPHONE ENCOUNTER
Nurse from [facility] called to inform us that pt has been admitted to them for long term care and they have provider on site that will provide all healthcare needs.  Aug 15th appt cancelled.

## 2024-02-09 ENCOUNTER — TELEPHONE (OUTPATIENT)
Dept: FAMILY MEDICINE CLINIC | Facility: CLINIC | Age: 84
End: 2024-02-09
Payer: MEDICARE

## 2024-02-09 RX ORDER — SORBITOL SOLUTION 70 %
30 SOLUTION, ORAL MISCELLANEOUS AS NEEDED
Qty: 90 ML | Refills: 0 | Status: SHIPPED | OUTPATIENT
Start: 2024-02-09

## 2024-02-13 ENCOUNTER — TELEPHONE (OUTPATIENT)
Dept: FAMILY MEDICINE CLINIC | Facility: CLINIC | Age: 84
End: 2024-02-13

## 2024-02-13 ENCOUNTER — OFFICE VISIT (OUTPATIENT)
Dept: FAMILY MEDICINE CLINIC | Facility: CLINIC | Age: 84
End: 2024-02-13
Payer: MEDICARE

## 2024-02-13 VITALS
WEIGHT: 125 LBS | HEIGHT: 62 IN | TEMPERATURE: 97.7 F | HEART RATE: 96 BPM | SYSTOLIC BLOOD PRESSURE: 160 MMHG | BODY MASS INDEX: 23 KG/M2 | DIASTOLIC BLOOD PRESSURE: 86 MMHG | RESPIRATION RATE: 14 BRPM | OXYGEN SATURATION: 96 %

## 2024-02-13 DIAGNOSIS — F41.9 ANXIETY: ICD-10-CM

## 2024-02-13 DIAGNOSIS — E03.9 ACQUIRED HYPOTHYROIDISM: ICD-10-CM

## 2024-02-13 DIAGNOSIS — E55.9 VITAMIN D DEFICIENCY: ICD-10-CM

## 2024-02-13 DIAGNOSIS — F51.01 PRIMARY INSOMNIA: ICD-10-CM

## 2024-02-13 DIAGNOSIS — I10 ESSENTIAL (PRIMARY) HYPERTENSION: ICD-10-CM

## 2024-02-13 DIAGNOSIS — G14 POST POLIOMYELITIS SYNDROME: Primary | ICD-10-CM

## 2024-02-13 PROCEDURE — 84439 ASSAY OF FREE THYROXINE: CPT | Performed by: FAMILY MEDICINE

## 2024-02-13 PROCEDURE — 84443 ASSAY THYROID STIM HORMONE: CPT | Performed by: FAMILY MEDICINE

## 2024-02-13 PROCEDURE — 80053 COMPREHEN METABOLIC PANEL: CPT | Performed by: FAMILY MEDICINE

## 2024-02-13 PROCEDURE — 82306 VITAMIN D 25 HYDROXY: CPT | Performed by: FAMILY MEDICINE

## 2024-02-13 RX ORDER — CHOLECALCIFEROL (VITAMIN D3) 125 MCG
TABLET ORAL
COMMUNITY
End: 2024-02-13 | Stop reason: SDUPTHER

## 2024-02-13 RX ORDER — ACETAMINOPHEN 325 MG/1
650 TABLET ORAL EVERY 8 HOURS PRN
COMMUNITY

## 2024-02-13 RX ORDER — SIMETHICONE 40MG/0.6ML
10 SUSPENSION, DROPS(FINAL DOSAGE FORM)(ML) ORAL DAILY PRN
Start: 2024-02-13

## 2024-02-13 RX ORDER — ZOLPIDEM TARTRATE 10 MG/1
10 TABLET ORAL
Qty: 30 TABLET | Refills: 0 | Status: SHIPPED | OUTPATIENT
Start: 2024-02-13

## 2024-02-13 RX ORDER — FENTANYL 100 UG/1
1 PATCH TRANSDERMAL
Qty: 10 PATCH | Refills: 0 | Status: SHIPPED | OUTPATIENT
Start: 2024-02-13 | End: 2024-02-15 | Stop reason: SDUPTHER

## 2024-02-13 RX ORDER — HYDROCODONE BITARTRATE AND ACETAMINOPHEN 10; 325 MG/1; MG/1
1 TABLET ORAL DAILY PRN
Qty: 30 TABLET | Refills: 0 | Status: SHIPPED | OUTPATIENT
Start: 2024-02-13 | End: 2024-02-15 | Stop reason: SDUPTHER

## 2024-02-13 RX ORDER — CYCLOSPORINE 0.5 MG/ML
1 EMULSION OPHTHALMIC EVERY 12 HOURS
COMMUNITY
End: 2024-02-13

## 2024-02-13 RX ORDER — GABAPENTIN 100 MG/1
200 CAPSULE ORAL 2 TIMES DAILY
Qty: 120 CAPSULE | Refills: 1 | Status: SHIPPED | OUTPATIENT
Start: 2024-02-13

## 2024-02-13 RX ORDER — DIAZEPAM 5 MG/1
TABLET ORAL
Qty: 30 TABLET | Refills: 0 | Status: SHIPPED | OUTPATIENT
Start: 2024-02-13

## 2024-02-13 RX ORDER — SIMETHICONE 40MG/0.6ML
SUSPENSION, DROPS(FINAL DOSAGE FORM)(ML) ORAL
COMMUNITY
End: 2024-02-13 | Stop reason: SDUPTHER

## 2024-02-13 RX ORDER — CHOLECALCIFEROL (VITAMIN D3) 125 MCG
1 TABLET ORAL DAILY
Qty: 90 TABLET | Refills: 1 | Status: SHIPPED | OUTPATIENT
Start: 2024-02-13

## 2024-02-13 RX ORDER — BISMUTH SUBSALICYLATE 262 MG/1
TABLET, CHEWABLE ORAL
COMMUNITY
End: 2024-02-13

## 2024-02-13 RX ORDER — DIAZEPAM 5 MG/1
5 TABLET ORAL NIGHTLY
COMMUNITY
End: 2024-02-13 | Stop reason: SDUPTHER

## 2024-02-13 RX ORDER — LEVOTHYROXINE SODIUM 137 UG/1
137 TABLET ORAL DAILY
Qty: 90 TABLET | Refills: 1 | Status: SHIPPED | OUTPATIENT
Start: 2024-02-13

## 2024-02-13 RX ORDER — HYDROCODONE BITARTRATE AND ACETAMINOPHEN 10; 325 MG/1; MG/1
1 TABLET ORAL NIGHTLY
COMMUNITY
End: 2024-02-13

## 2024-02-13 RX ORDER — BISMUTH SUBSALICYLATE 262 MG/1
262 TABLET, CHEWABLE ORAL DAILY PRN
Status: SHIPPED
Start: 2024-02-13

## 2024-02-13 NOTE — PROGRESS NOTES
Venipuncture performed in right arm by Danay Boss CMA  with good hemostasis. Patient tolerated well. 02/13/24 Danay Boss CMA

## 2024-02-13 NOTE — PROGRESS NOTES
Subjective   Karina Saleem is a 83 y.o. female.     Chief Complaint   Patient presents with    Transitional Care Management     Hospital and Rehab follow up          Current Outpatient Medications:     acetaminophen (TYLENOL) 325 MG tablet, Take 2 tablets by mouth Every 8 (Eight) Hours As Needed for Mild Pain., Disp: , Rfl:     bismuth subsalicylate (PEPTO BISMOL) 262 MG chewable tablet, Chew 1 tablet Daily As Needed for Indigestion. 15ML every 8 hours as needed for stomach discomfort, Disp: , Rfl:     Levi Carb-Mag Hydrox-Simeth (Mylanta Coat & Cool) 1200-270-80 MG/10ML suspension, Take 10 mL by mouth Daily As Needed (GERD). Give 20ML by mouth every 4 hours as needed for indigestion, Disp: , Rfl:     diazePAM (VALIUM) 5 MG tablet, 1 po q dinner prn, Disp: 30 tablet, Rfl: 0    Ergocalciferol (Vitamin D2) 50 MCG (2000 UT) tablet, Take 1 tablet by mouth Daily., Disp: 90 tablet, Rfl: 1    gabapentin (NEURONTIN) 100 MG capsule, Take 2 capsules by mouth 2 (Two) Times a Day., Disp: 120 capsule, Rfl: 1    levothyroxine (SYNTHROID, LEVOTHROID) 137 MCG tablet, Take 1 tablet by mouth Daily., Disp: 90 tablet, Rfl: 1    naloxone (NARCAN) 4 MG/0.1ML nasal spray, 1 spray into the nostril(s) as directed by provider As Needed., Disp: , Rfl:     nitroglycerin (NITROSTAT) 0.4 MG SL tablet, DISSOLVE 1 TABLET UNDER THE TONGUE EVERY 5 MINUTES AS NEEDED FOR CHEST PAIN. DO NOT EXCEED A TOTAL OF 3 DOSES IN 15 MINUTES., Disp: 50 tablet, Rfl: 1    zolpidem (AMBIEN) 10 MG tablet, Take 1 tablet by mouth every night at bedtime., Disp: 30 tablet, Rfl: 0    fentaNYL (DURAGESIC) 100 MCG/HR patch, Place 1 patch on the skin as directed by provider Every 72 (Seventy-Two) Hours., Disp: 10 patch, Rfl: 0    HYDROcodone-acetaminophen (NORCO)  MG per tablet, Take 1 tablet by mouth Daily As Needed for Severe Pain., Disp: 30 tablet, Rfl: 0    lactulose (CHRONULAC) 10 GM/15ML solution, Take 30 mL by mouth 2 (Two) Times a Day As Needed  (constipation)., Disp: 473 mL, Rfl: 0    Past Medical History:   Diagnosis Date    Anxiety     Cataracts, bilateral     Femur fracture, right     Hypothyroidism     Insomnia     Kyphoscoliosis     MAMMO     NEG = 2019    Osteoporosis     Paraplegic immobility syndrome     Peripheral neuropathy     Polio osteopathy of lower leg, left     Polio osteopathy of lower leg, right     Scoliosis        Past Surgical History:   Procedure Laterality Date    APPENDECTOMY      LIPOMA EXCISION Bilateral     breast    TOTAL ABDOMINAL HYSTERECTOMY         Family History   Problem Relation Age of Onset    Diabetes Mother     Heart disease Mother         CHF    Heart failure Father     Heart disease Father     COPD Father         BLACK LUNG    Lymphoma Sister     Lung cancer Brother     Pancreatic cancer Brother     Bone cancer Brother     Cancer Brother        Social History     Socioeconomic History    Marital status: Single   Tobacco Use    Smoking status: Never     Passive exposure: Never    Smokeless tobacco: Never   Vaping Use    Vaping status: Never Used   Substance and Sexual Activity    Alcohol use: No    Drug use: No    Sexual activity: Defer       History of Present Illness   The patient is an 83-year-old female who is here for follow-up from hospital and then rehab last year, 2023, for acute gallbladder disease.    She was sent to [facility] for rehab on her knee, but she never got rehabilitation. She laid in bed for weeks and now she does not have the strength she had before. The woman that was helping her get some home health , so they turned her records over to another person who hasn't contacted her yet. She is trying to get some home health again. All her medications were changed while she was in rehab. She was told that her blood pressure was good at rehab. She is still on hydrocodone.    Her left upper extremity has been bothering her a lot now. She uses her left upper extremity to get in bed and it  bothers her a lot. She wants to get it checked out by Dr. Fahad Crouch.     She has pain in her back. She has feeling in her lower extremities but cannot move them.     When they did studies at [facility], they told her that she had 1 kidney stone, but they did not think it would cause any problem. She was given antibiotics for the gallstone. She takes prune juice and raisin bran for her constipation.  .  She has not received any COVID-19 vaccines.    The following portions of the patient's history were reviewed and updated as appropriate: allergies, current medications, past family history, past medical history, past social history, past surgical history and problem list.    Review of Systems   Constitutional:  Negative for activity change, appetite change, fatigue, unexpected weight gain and unexpected weight loss.   Respiratory:  Negative for cough, chest tightness, shortness of breath and wheezing.    Cardiovascular:  Negative for chest pain, palpitations and leg swelling.   Gastrointestinal:  Negative for constipation and diarrhea.   Genitourinary:  Negative for frequency, urgency and urinary incontinence.   Musculoskeletal:  Positive for arthralgias, back pain and gait problem. Negative for myalgias.   Neurological:  Positive for weakness. Negative for dizziness, facial asymmetry, speech difficulty, light-headedness, headache, memory problem and confusion.   Psychiatric/Behavioral:  Negative for sleep disturbance.        Vitals:    02/13/24 1358   BP: 160/86   Pulse: 96   Resp: 14   Temp: 97.7 °F (36.5 °C)   SpO2: 96%       Objective   Physical Exam  Vitals and nursing note reviewed.   Constitutional:       General: She is not in acute distress.     Appearance: She is well-developed. She is not ill-appearing or toxic-appearing.   HENT:      Head: Normocephalic and atraumatic.   Cardiovascular:      Rate and Rhythm: Normal rate and regular rhythm.      Heart sounds: Normal heart sounds. No murmur  heard.  Pulmonary:      Effort: Pulmonary effort is normal. No respiratory distress.      Breath sounds: Normal breath sounds. No stridor. No wheezing, rhonchi or rales.   Skin:     General: Skin is warm and dry.      Findings: No rash.   Neurological:      Mental Status: She is alert and oriented to person, place, and time.      Cranial Nerves: No cranial nerve deficit.      Gait: Gait abnormal (wheelchair bound).   Psychiatric:         Mood and Affect: Mood normal.         Behavior: Behavior normal.         Thought Content: Thought content normal.         Judgment: Judgment normal.         BMI is within normal parameters. No other follow-up for BMI required.      Assessment & Plan   Diagnoses and all orders for this visit:    1. Post poliomyelitis syndrome (Primary)  -     Discontinue: fentaNYL (DURAGESIC) 100 MCG/HR patch; Place 1 patch on the skin as directed by provider Every 72 (Seventy-Two) Hours.  Dispense: 10 patch; Refill: 0    2. Primary insomnia  -     zolpidem (AMBIEN) 10 MG tablet; Take 1 tablet by mouth every night at bedtime.  Dispense: 30 tablet; Refill: 0    3. Essential (primary) hypertension  -     Cancel: Comprehensive Metabolic Panel  -     Comprehensive Metabolic Panel    4. Anxiety  -     diazePAM (VALIUM) 5 MG tablet; 1 po q dinner prn  Dispense: 30 tablet; Refill: 0    5. Vitamin D deficiency  -     Cancel: Vitamin D,25-Hydroxy  -     Vitamin D,25-Hydroxy    6. Acquired hypothyroidism  -     Cancel: TSH  -     Cancel: T4, Free  -     T4, Free  -     TSH    Other orders  -     bismuth subsalicylate (PEPTO BISMOL) 262 MG chewable tablet; Chew 1 tablet Daily As Needed for Indigestion. 15ML every 8 hours as needed for stomach discomfort  -     Levi Carb-Mag Hydrox-Simeth (Mylanta Coat & Cool) 1200-270-80 MG/10ML suspension; Take 10 mL by mouth Daily As Needed (GERD). Give 20ML by mouth every 4 hours as needed for indigestion  -     gabapentin (NEURONTIN) 100 MG capsule; Take 2 capsules by mouth  2 (Two) Times a Day.  Dispense: 120 capsule; Refill: 1  -     levothyroxine (SYNTHROID, LEVOTHROID) 137 MCG tablet; Take 1 tablet by mouth Daily.  Dispense: 90 tablet; Refill: 1  -     Ergocalciferol (Vitamin D2) 50 MCG (2000 UT) tablet; Take 1 tablet by mouth Daily.  Dispense: 90 tablet; Refill: 1  -     Discontinue: HYDROcodone-acetaminophen (NORCO)  MG per tablet; Take 1 tablet by mouth Daily As Needed for Severe Pain.  Dispense: 30 tablet; Refill: 0    Hospital follow-up.  Her blood pressure is slightly elevated today.   She will call us with the name of a home healthcare, and I will place a referral.   She will take Ambien at night. She can take Valium at dinner, but she cannot take it at the same time at night.   She will take hydrocodone daily as needed. Strongly advised the patient not to take it at bedtime.   Will refill all her prescriptions.     Left upper extremity pain.  She will call Dr. Fahad Crouch to get an x-ray.    Health maintenance.  She is not interested in receiving any vaccines today.   I will obtain laboratory studies to check her blood glucose, kidney function, liver function, vitamin D, and thyroid.    Transcribed from ambient dictation for Aditi Agarwla DO by Emily Koroma.  02/13/24   15:45 EST    Patient or patient representative verbalized consent to the visit recording.  I have personally performed the services described in this document as transcribed by the above individual, and it is both accurate and complete.

## 2024-02-13 NOTE — TELEPHONE ENCOUNTER
Caller: Karina Saleem    Relationship to patient: Self    Best call back number: [phone]    Patient is needing:     CALLING TO REPORT THAT THE COMPANY THAT COMES IN AND HELPS HER IS:     [email or web address]  [address] [place] [id number] [phone]

## 2024-02-14 ENCOUNTER — TELEPHONE (OUTPATIENT)
Dept: FAMILY MEDICINE CLINIC | Facility: CLINIC | Age: 84
End: 2024-02-14
Payer: MEDICARE

## 2024-02-14 DIAGNOSIS — G14 POST POLIOMYELITIS SYNDROME: ICD-10-CM

## 2024-02-14 LAB
25(OH)D3 SERPL-MCNC: 47.4 NG/ML (ref 30–100)
ALBUMIN SERPL-MCNC: 3.9 G/DL (ref 3.5–5.2)
ALBUMIN/GLOB SERPL: 1.7 G/DL
ALP SERPL-CCNC: 79 U/L (ref 39–117)
ALT SERPL W P-5'-P-CCNC: 12 U/L (ref 1–33)
ANION GAP SERPL CALCULATED.3IONS-SCNC: 12.4 MMOL/L (ref 5–15)
AST SERPL-CCNC: 19 U/L (ref 1–32)
BILIRUB SERPL-MCNC: 0.5 MG/DL (ref 0–1.2)
BUN SERPL-MCNC: 11 MG/DL (ref 8–23)
BUN/CREAT SERPL: 22.9 (ref 7–25)
CALCIUM SPEC-SCNC: 9.6 MG/DL (ref 8.6–10.5)
CHLORIDE SERPL-SCNC: 100 MMOL/L (ref 98–107)
CO2 SERPL-SCNC: 26.6 MMOL/L (ref 22–29)
CREAT SERPL-MCNC: 0.48 MG/DL (ref 0.57–1)
EGFRCR SERPLBLD CKD-EPI 2021: 93 ML/MIN/1.73
GLOBULIN UR ELPH-MCNC: 2.3 GM/DL
GLUCOSE SERPL-MCNC: 98 MG/DL (ref 65–99)
POTASSIUM SERPL-SCNC: 3.5 MMOL/L (ref 3.5–5.2)
PROT SERPL-MCNC: 6.2 G/DL (ref 6–8.5)
SODIUM SERPL-SCNC: 139 MMOL/L (ref 136–145)
T4 FREE SERPL-MCNC: 1.07 NG/DL (ref 0.93–1.7)
TSH SERPL DL<=0.05 MIU/L-ACNC: 3.91 UIU/ML (ref 0.27–4.2)

## 2024-02-14 RX ORDER — FENTANYL 100 UG/1
1 PATCH TRANSDERMAL
Qty: 10 PATCH | Refills: 0 | OUTPATIENT
Start: 2024-02-14

## 2024-02-15 ENCOUNTER — NURSE TRIAGE (OUTPATIENT)
Dept: CALL CENTER | Facility: HOSPITAL | Age: 84
End: 2024-02-15
Payer: MEDICARE

## 2024-02-15 DIAGNOSIS — G14 POST POLIOMYELITIS SYNDROME: ICD-10-CM

## 2024-02-15 RX ORDER — HYDROCODONE BITARTRATE AND ACETAMINOPHEN 10; 325 MG/1; MG/1
1 TABLET ORAL DAILY PRN
Qty: 30 TABLET | Refills: 0 | Status: SHIPPED | OUTPATIENT
Start: 2024-02-15

## 2024-02-15 RX ORDER — FENTANYL 100 UG/1
1 PATCH TRANSDERMAL
Qty: 10 PATCH | Refills: 0 | Status: SHIPPED | OUTPATIENT
Start: 2024-02-15

## 2024-02-28 RX ORDER — LACTULOSE 10 G/15ML
20 SOLUTION ORAL 2 TIMES DAILY PRN
Qty: 473 ML | Refills: 0 | Status: SHIPPED | OUTPATIENT
Start: 2024-02-28

## 2024-03-07 RX ORDER — ESOMEPRAZOLE MAGNESIUM 40 MG/1
40 CAPSULE, DELAYED RELEASE ORAL
Qty: 30 CAPSULE | Refills: 2 | Status: SHIPPED | OUTPATIENT
Start: 2024-03-07

## 2024-03-11 DIAGNOSIS — F51.01 PRIMARY INSOMNIA: ICD-10-CM

## 2024-03-11 DIAGNOSIS — G14 POST POLIOMYELITIS SYNDROME: Primary | ICD-10-CM

## 2024-03-11 RX ORDER — HYDROCODONE BITARTRATE AND ACETAMINOPHEN 10; 325 MG/1; MG/1
1 TABLET ORAL DAILY PRN
Qty: 30 TABLET | Refills: 0 | Status: SHIPPED | OUTPATIENT
Start: 2024-03-11

## 2024-03-11 RX ORDER — ZOLPIDEM TARTRATE 10 MG/1
10 TABLET ORAL
Qty: 30 TABLET | Refills: 0 | Status: SHIPPED | OUTPATIENT
Start: 2024-03-11

## 2024-03-12 DIAGNOSIS — F41.9 ANXIETY: ICD-10-CM

## 2024-03-12 RX ORDER — DIAZEPAM 5 MG/1
TABLET ORAL
Qty: 30 TABLET | Refills: 0 | Status: SHIPPED | OUTPATIENT
Start: 2024-03-12

## 2024-03-22 ENCOUNTER — TELEPHONE (OUTPATIENT)
Dept: FAMILY MEDICINE CLINIC | Facility: CLINIC | Age: 84
End: 2024-03-22

## 2024-03-22 NOTE — TELEPHONE ENCOUNTER
Caller: Karina Saleem    Relationship: Self    Best call back number: [phone]     What medication are you requesting: CREAM OR OINTMENT FOR SKIN BREAKDOWN    What are your current symptoms: SKIN BREAKDOWN ON BOTTOM DUE TO BEING CONFINED TO A WHEELCHAIR.    How long have you been experiencing symptoms:     Have you had these symptoms before:    [] Yes  [] No    Have you been treated for these symptoms before:   [] Yes  [] No    If a prescription is needed, what is your preferred pharmacy and phone number: [facility] #[id number] [place] [id number] AT [address] [phone] [facility] [phone]      Additional notes: PLEASE CALL AND ADVISE.

## 2024-04-04 ENCOUNTER — TELEPHONE (OUTPATIENT)
Dept: FAMILY MEDICINE CLINIC | Facility: CLINIC | Age: 84
End: 2024-04-04

## 2024-04-04 ENCOUNTER — TELEPHONE (OUTPATIENT)
Dept: FAMILY MEDICINE CLINIC | Facility: CLINIC | Age: 84
End: 2024-04-04
Payer: MEDICARE

## 2024-04-04 DIAGNOSIS — G14 POST POLIOMYELITIS SYNDROME: ICD-10-CM

## 2024-04-04 DIAGNOSIS — F51.01 PRIMARY INSOMNIA: ICD-10-CM

## 2024-04-04 NOTE — TELEPHONE ENCOUNTER
Caller: Karina Saleem    Relationship to patient: Self    Best call back number: [phone]    Patient is needing:     PATIENT STATES SHE HURT HER LEG, SHE WOULD LIKE TO KNOW IF SHE SHOULD MAKE AN APPT. WITH DOCTOR STROOT, OR GO TO THE ER.

## 2024-04-08 ENCOUNTER — TELEPHONE (OUTPATIENT)
Dept: FAMILY MEDICINE CLINIC | Facility: CLINIC | Age: 84
End: 2024-04-08

## 2024-04-08 DIAGNOSIS — G14 POST POLIOMYELITIS SYNDROME: ICD-10-CM

## 2024-04-08 DIAGNOSIS — F51.01 PRIMARY INSOMNIA: ICD-10-CM

## 2024-04-08 DIAGNOSIS — F41.9 ANXIETY: ICD-10-CM

## 2024-04-08 RX ORDER — HYDROCODONE BITARTRATE AND ACETAMINOPHEN 10; 325 MG/1; MG/1
1 TABLET ORAL DAILY PRN
Qty: 30 TABLET | Refills: 0 | Status: SHIPPED | OUTPATIENT
Start: 2024-04-08

## 2024-04-08 RX ORDER — ZOLPIDEM TARTRATE 10 MG/1
10 TABLET ORAL
Qty: 30 TABLET | Refills: 0 | Status: SHIPPED | OUTPATIENT
Start: 2024-04-08

## 2024-04-08 RX ORDER — DIAZEPAM 5 MG/1
TABLET ORAL
Qty: 30 TABLET | Refills: 0 | Status: SHIPPED | OUTPATIENT
Start: 2024-04-08

## 2024-04-08 NOTE — TELEPHONE ENCOUNTER
Caller: Tioraymond Karina    Relationship: Self    Best call back number:     [phone]       Requested Prescriptions:   Requested Prescriptions     Pending Prescriptions Disp Refills    zolpidem (AMBIEN) 10 MG tablet 30 tablet 0     Sig: Take 1 tablet by mouth every night at bedtime.        Pharmacy where request should be sent: [facility] #[id number] [address] AT [address] [phone] [facility] [phone]      Last office visit with prescribing clinician: 2/13/2024   Last telemedicine visit with prescribing clinician: Visit date not found   Next office visit with prescribing clinician: Visit date not found     Additional details provided by patient: PATIENT IS ALL OUT OF MEDICATION.    Does the patient have less than a 3 day supply:  [x] Yes  [] No    Would you like a call back once the refill request has been completed: [x] Yes [] No    If the office needs to give you a call back, can they leave a voicemail: [] Yes [] No    Andrzej Hanna Rep   04/08/24 11:51 EDT

## 2024-04-11 ENCOUNTER — TELEPHONE (OUTPATIENT)
Dept: FAMILY MEDICINE CLINIC | Facility: CLINIC | Age: 84
End: 2024-04-11

## 2024-04-15 DIAGNOSIS — G14 POST POLIOMYELITIS SYNDROME: ICD-10-CM

## 2024-04-15 RX ORDER — FENTANYL 100 UG/1
1 PATCH TRANSDERMAL
Qty: 10 PATCH | Refills: 0 | Status: SHIPPED | OUTPATIENT
Start: 2024-04-15

## 2024-04-15 NOTE — TELEPHONE ENCOUNTER
Caller: Karina Saleem    Relationship: Self    Best call back number: [phone]     Requested Prescriptions:   Requested Prescriptions     Pending Prescriptions Disp Refills    fentaNYL (DURAGESIC) 100 MCG/HR patch 10 patch 0     Sig: Place 1 patch on the skin as directed by provider Every 72 (Seventy-Two) Hours.        Pharmacy where request should be sent: [facility] #[id number] [place] [address] [address]  AT [address] [phone] [facility] [phone]      Last office visit with prescribing clinician: 2/13/2024   Last telemedicine visit with prescribing clinician: Visit date not found   Next office visit with prescribing clinician: Visit date not found     Additional details provided by patient: OUT    Does the patient have less than a 3 day supply:  [x] Yes  [] No    Would you like a call back once the refill request has been completed: [] Yes [] No    If the office needs to give you a call back, can they leave a voicemail: [] Yes [] No    Aditya Zhao   04/15/24 15:04 EDT

## 2024-04-17 ENCOUNTER — TELEPHONE (OUTPATIENT)
Dept: FAMILY MEDICINE CLINIC | Facility: CLINIC | Age: 84
End: 2024-04-17

## 2024-04-17 NOTE — TELEPHONE ENCOUNTER
Caller: Karina Saleem    Relationship: Self    Best call back number: [phone]     PATIENT CALLED STATING HER RIGHT LEG IS SWOLLEN AND HURTS.  SHE IS WONDERING WHAT SHE SHOULD DO FOR THIS.    PATIENT REQUESTING A CALLBACK

## 2024-04-17 NOTE — TELEPHONE ENCOUNTER
Pt informed and says NKI, and it's not red or looking infected. She will try to elevated as much as poss today.

## 2024-04-18 NOTE — TELEPHONE ENCOUNTER
Caller: Karina Saleem    Relationship: Self    Best call back number: [phone]    PATIENT IS GOING TO [facility] ER FOR HER LEG PAIN TODAY.    SHE WILL CALL AFTER TO SCHEDULE A FOLLOW UP

## 2024-04-19 ENCOUNTER — TELEPHONE (OUTPATIENT)
Dept: FAMILY MEDICINE CLINIC | Facility: CLINIC | Age: 84
End: 2024-04-19

## 2024-04-19 NOTE — TELEPHONE ENCOUNTER
Caller: Karina Saleem    Relationship: Self    Best call back number: [phone]     What was the call regarding: PATIENT IS REQUESTING TO SPEAK WITH CLINICAL     SHE SAID IT IS VERY IMPORTANT    PATIENT DID NOT GIVE ANY MORE DETAILS    PLEASE ADVISE

## 2024-05-01 DIAGNOSIS — F51.01 PRIMARY INSOMNIA: ICD-10-CM

## 2024-05-01 RX ORDER — ZOLPIDEM TARTRATE 10 MG/1
10 TABLET ORAL
Qty: 30 TABLET | Refills: 0 | Status: SHIPPED | OUTPATIENT
Start: 2024-05-01

## 2024-05-03 ENCOUNTER — TELEPHONE (OUTPATIENT)
Dept: FAMILY MEDICINE CLINIC | Facility: CLINIC | Age: 84
End: 2024-05-03
Payer: MEDICARE

## 2024-05-03 ENCOUNTER — TELEPHONE (OUTPATIENT)
Dept: FAMILY MEDICINE CLINIC | Facility: CLINIC | Age: 84
End: 2024-05-03

## 2024-05-03 DIAGNOSIS — G14 POST POLIOMYELITIS SYNDROME: ICD-10-CM

## 2024-05-03 NOTE — TELEPHONE ENCOUNTER
Hub staff attempted to follow warm transfer process and was unsuccessful     Caller: Karina Saleem    Relationship to patient: Self    Best call back number: [phone]     Patient is needing:  PATIENT STATED THAT SOMEONE WAS GOING TO  MEDICATION THAT PROVIDERS NURSE SAID WAS AT THE PHARMACY. PHARMACY TOLD PATIENT THERE IS NO MEDICATION THERE FOR HER. PATIENT WOULD LIKE A CALLBACK.

## 2024-05-03 NOTE — TELEPHONE ENCOUNTER
RELAY    Ambien Rx was sent in on 5/1, but not due for pickup until 5/9. Pt can verify with pharmacy.    Attempted to call pt - rings busy

## 2024-05-03 NOTE — TELEPHONE ENCOUNTER
Caller: Karina Saleem    Relationship: Self    Best call back number: [phone]     What was the call regarding: PATIENT STATED SHE HAS BEEN HAVING SWELLING IN HER LEGS AND IS REQUESTING TO KNOW WHAT SHE SHOULD DO ABOUT THIS    PLEASE ADVISE

## 2024-05-03 NOTE — TELEPHONE ENCOUNTER
Pt states no, they told her it was cellulitis, and it's only her R leg. She finished abx for this about 10 days ago, but the swelling never went away. She keeps it elevated as much as poss, but it is still very painful and swollen. No seeping wounds or open areas.     Also, wants to know if there's something otc like Annamarie Magic Butt Cream, that would be less expensive. She needs it for her bottom, and the brand is $54.     JULIO Colunga

## 2024-05-06 ENCOUNTER — TELEPHONE (OUTPATIENT)
Dept: FAMILY MEDICINE CLINIC | Facility: CLINIC | Age: 84
End: 2024-05-06
Payer: MEDICARE

## 2024-05-06 NOTE — TELEPHONE ENCOUNTER
Caller: Karina Saleem    Relationship: Self    Best call back number: [phone]     What was the call regarding:PATIENT WAS CALLING TO CANCEL HER APPT FOR 5/7 DUE TO NO TRANSPORTATION. HUB ATTEMPTED TO R/S BUT THE APPT HAD BEEN GIVEN ADDITIONAL TIME AND HUB NEEDED TO TRANSFER THE CALL. PATIENT DID NOT WANT TO HOLD AND ASKED THE OFFICE TO CALL HER BACK TO SCHEDULE INSTEAD.

## 2024-05-06 NOTE — TELEPHONE ENCOUNTER
Name: Karina Saleem    Relationship: Self    Best Callback Number: [phone]     HUB PROVIDED THE RELAY MESSAGE FROM THE OFFICE   PATIENT VOICED UNDERSTANDING AND HAS NO FURTHER QUESTIONS AT THIS TIME

## 2024-05-06 NOTE — TELEPHONE ENCOUNTER
Caller: Karina Saleem    Relationship: Self    Best call back number:[phone]     What was the call regarding: PATIENT STATED SHE IS STILL HAVING SWELLING IN HER LEGS    PLEASE ADVISE

## 2024-05-07 DIAGNOSIS — G14 POST POLIOMYELITIS SYNDROME: ICD-10-CM

## 2024-05-07 DIAGNOSIS — F41.9 ANXIETY: ICD-10-CM

## 2024-05-07 RX ORDER — HYDROCODONE BITARTRATE AND ACETAMINOPHEN 10; 325 MG/1; MG/1
1 TABLET ORAL DAILY PRN
Qty: 30 TABLET | Refills: 0 | Status: SHIPPED | OUTPATIENT
Start: 2024-05-07

## 2024-05-07 RX ORDER — DIAZEPAM 5 MG/1
TABLET ORAL
Qty: 30 TABLET | Refills: 0 | Status: SHIPPED | OUTPATIENT
Start: 2024-05-07

## 2024-05-09 ENCOUNTER — OFFICE VISIT (OUTPATIENT)
Dept: FAMILY MEDICINE CLINIC | Facility: CLINIC | Age: 84
End: 2024-05-09
Payer: MEDICARE

## 2024-05-09 VITALS
BODY MASS INDEX: 22.86 KG/M2 | DIASTOLIC BLOOD PRESSURE: 77 MMHG | SYSTOLIC BLOOD PRESSURE: 148 MMHG | OXYGEN SATURATION: 98 % | TEMPERATURE: 98 F | HEIGHT: 62 IN | HEART RATE: 78 BPM

## 2024-05-09 DIAGNOSIS — B35.1 ONYCHOMYCOSIS: ICD-10-CM

## 2024-05-09 DIAGNOSIS — G14 POST-POLIO SYNDROME: ICD-10-CM

## 2024-05-09 DIAGNOSIS — B35.3 TINEA PEDIS OF BOTH FEET: ICD-10-CM

## 2024-05-09 DIAGNOSIS — M81.8 DISUSE OSTEOPOROSIS: Chronic | ICD-10-CM

## 2024-05-09 DIAGNOSIS — L03.115 CELLULITIS OF RIGHT LOWER EXTREMITY: Primary | ICD-10-CM

## 2024-05-09 PROCEDURE — 1125F AMNT PAIN NOTED PAIN PRSNT: CPT | Performed by: FAMILY MEDICINE

## 2024-05-09 PROCEDURE — 99214 OFFICE O/P EST MOD 30 MIN: CPT | Performed by: FAMILY MEDICINE

## 2024-05-09 PROCEDURE — 1159F MED LIST DOCD IN RCRD: CPT | Performed by: FAMILY MEDICINE

## 2024-05-09 PROCEDURE — 1160F RVW MEDS BY RX/DR IN RCRD: CPT | Performed by: FAMILY MEDICINE

## 2024-05-09 RX ORDER — GABAPENTIN 100 MG/1
200 CAPSULE ORAL 2 TIMES DAILY
Qty: 120 CAPSULE | Refills: 1 | Status: SHIPPED | OUTPATIENT
Start: 2024-05-09

## 2024-05-09 RX ORDER — ESOMEPRAZOLE MAGNESIUM 40 MG/1
40 CAPSULE, DELAYED RELEASE ORAL
Qty: 90 CAPSULE | Refills: 1 | Status: SHIPPED | OUTPATIENT
Start: 2024-05-09

## 2024-05-09 RX ORDER — LEVOTHYROXINE SODIUM 137 UG/1
137 TABLET ORAL DAILY
Qty: 90 TABLET | Refills: 2 | Status: SHIPPED | OUTPATIENT
Start: 2024-05-09

## 2024-05-09 RX ORDER — TERBINAFINE HYDROCHLORIDE 250 MG/1
250 TABLET ORAL DAILY
Qty: 14 TABLET | Refills: 0 | Status: SHIPPED | OUTPATIENT
Start: 2024-05-09

## 2024-05-09 NOTE — PROGRESS NOTES
Subjective   Karina Saleem is a 83 y.o. female.     Chief Complaint   Patient presents with    Leg Swelling    polio         Current Outpatient Medications:     acetaminophen (TYLENOL) 325 MG tablet, Take 2 tablets by mouth Every 8 (Eight) Hours As Needed for Mild Pain., Disp: , Rfl:     bismuth subsalicylate (PEPTO BISMOL) 262 MG chewable tablet, Chew 1 tablet Daily As Needed for Indigestion. 15ML every 8 hours as needed for stomach discomfort, Disp: , Rfl:     Levi Carb-Mag Hydrox-Simeth (Mylanta Coat & Cool) 1200-270-80 MG/10ML suspension, Take 10 mL by mouth Daily As Needed (GERD). Give 20ML by mouth every 4 hours as needed for indigestion, Disp: , Rfl:     diazePAM (VALIUM) 5 MG tablet, 1 po q dinner prn, Disp: 30 tablet, Rfl: 0    Ergocalciferol (Vitamin D2) 50 MCG (2000 UT) tablet, Take 1 tablet by mouth Daily., Disp: 90 tablet, Rfl: 1    esomeprazole (nexIUM) 40 MG capsule, Take 1 capsule by mouth Every Morning Before Breakfast., Disp: 90 capsule, Rfl: 1    fentaNYL (DURAGESIC) 100 MCG/HR patch, Place 1 patch on the skin as directed by provider Every 72 (Seventy-Two) Hours., Disp: 10 patch, Rfl: 0    gabapentin (NEURONTIN) 100 MG capsule, Take 2 capsules by mouth 2 (Two) Times a Day., Disp: 120 capsule, Rfl: 1    HYDROcodone-acetaminophen (NORCO)  MG per tablet, Take 1 tablet by mouth Daily As Needed for Severe Pain., Disp: 30 tablet, Rfl: 0    lactulose (CHRONULAC) 10 GM/15ML solution, Take 30 mL by mouth 2 (Two) Times a Day As Needed (constipation)., Disp: 473 mL, Rfl: 0    levothyroxine (SYNTHROID, LEVOTHROID) 137 MCG tablet, Take 1 tablet by mouth Daily., Disp: 90 tablet, Rfl: 2    naloxone (NARCAN) 4 MG/0.1ML nasal spray, 1 spray into the nostril(s) as directed by provider As Needed., Disp: , Rfl:     nitroglycerin (NITROSTAT) 0.4 MG SL tablet, DISSOLVE 1 TABLET UNDER THE TONGUE EVERY 5 MINUTES AS NEEDED FOR CHEST PAIN. DO NOT EXCEED A TOTAL OF 3 DOSES IN 15 MINUTES., Disp: 50 tablet, Rfl: 1     zolpidem (AMBIEN) 10 MG tablet, Take 1 tablet by mouth every night at bedtime., Disp: 30 tablet, Rfl: 0    terbinafine (lamiSIL) 250 MG tablet, Take 1 tablet by mouth Daily., Disp: 14 tablet, Rfl: 0    Past Medical History:   Diagnosis Date    Anxiety     Cataracts, bilateral     Femur fracture, right     Hypothyroidism     Insomnia     Kyphoscoliosis     MAMMO     NEG = 2019    Osteoporosis     Paraplegic immobility syndrome     Peripheral neuropathy     Polio osteopathy of lower leg, left     Polio osteopathy of lower leg, right     Scoliosis        Past Surgical History:   Procedure Laterality Date    APPENDECTOMY      LIPOMA EXCISION Bilateral     breast    TOTAL ABDOMINAL HYSTERECTOMY         Family History   Problem Relation Age of Onset    Diabetes Mother     Heart disease Mother         CHF    Heart failure Father     Heart disease Father     COPD Father         BLACK LUNG    Lymphoma Sister     Lung cancer Brother     Pancreatic cancer Brother     Bone cancer Brother     Cancer Brother        Social History     Socioeconomic History    Marital status: Single   Tobacco Use    Smoking status: Never     Passive exposure: Never    Smokeless tobacco: Never   Vaping Use    Vaping status: Never Used   Substance and Sexual Activity    Alcohol use: No    Drug use: No    Sexual activity: Defer       History of Present Illness  The patient is an 83-year-old female who is here with complaints of recent right lower extremity cellulitis/ swelling.    The patient was diagnosed with right lower extremity cellulitis at the Emergency Room on 04/18/2024. She was administered Keflex and subsequently underwent an ultrasound to rule out a blood clot, which yielded negative results. She was informed that the cellulitis would require an extended period to resolve, but the etiology remains undetermined. The patient reports an improvement in her pain levels. She has been receiving home physical therapy, which provides dressing  changes, and wrapping her leg, which she reports as beneficial. A compression sleeve was applied to her leg once, but it resulted in severe pain. She maintains regular washing of her socks.    The patient reports a long-standing issue with one of her fingers, with no associated itching. The finger exhibited redness and subsequently extended to her knuckle, causing her joint to have limited functionality. This has been a persistent issue for several years.    The patient is unable to trim her nails due to her inability to bend over. During her rehabilitation stay, she was told  would trim her nails, but they never did. She does not have home health care. Her therapist recommended an over-the-counter cream for her toenails to aid in skin softening.    Supplemental Information  She has Medicare and Medicaid. She was given a phone number to call for free rides. She is getting something from [facility].     Her left shoulder bothers her sometimes. She thinks it is because she uses it when she gets in bed. She has an appointment with her ortho doctor next week. She can not do the things like she did when she first started going to us. She does not want to go to a nursing home. She likes her little apartment. She has run out of her thyroid pills. She takes her stomach medication every day. She does not take vitamin D. She still has some lactulose for constipation. She ran out of her Valium a couple of days ago.        The following portions of the patient's history were reviewed and updated as appropriate: allergies, current medications, past family history, past medical history, past social history, past surgical history and problem list.    Review of Systems   Constitutional:  Positive for activity change. Negative for appetite change, fever, unexpected weight gain and unexpected weight loss.   Cardiovascular:  Positive for leg swelling.   Musculoskeletal:  Positive for gait problem.   Skin:  Positive for color  change. Negative for rash and wound.   Psychiatric/Behavioral:  Positive for stress.        Vitals:    05/09/24 1355   BP: 148/77   Pulse: 78   Temp: 98 °F (36.7 °C)   SpO2: 98%       Objective   Physical Exam  Vitals and nursing note reviewed.   Constitutional:       General: She is not in acute distress.     Appearance: She is well-developed and well-groomed. She is not ill-appearing or toxic-appearing.   HENT:      Head: Normocephalic and atraumatic.   Cardiovascular:      Rate and Rhythm: Normal rate and regular rhythm.      Heart sounds: Normal heart sounds. No murmur heard.  Pulmonary:      Effort: Pulmonary effort is normal. No respiratory distress.      Breath sounds: Normal breath sounds. No stridor. No wheezing, rhonchi or rales.          Comments: +remote area of pressure ulcer w/ intact scarring w/o skin breakdown  Musculoskeletal:      Thoracic back: Scoliosis present.      Lumbar back: Scoliosis present.      Right lower leg: Swelling present.      Right foot: Decreased range of motion. Swelling and deformity present.      Left foot: Decreased range of motion. Deformity present.      Comments: No erythema @ Rt distal LE  +thickened/ brittle fingernails b/l   Feet:      Right foot:      Skin integrity: Erythema and dry skin present.      Toenail Condition: Right toenails are abnormally thick. Fungal disease present.     Left foot:      Skin integrity: Erythema and dry skin present.      Toenail Condition: Left toenails are abnormally thick. Fungal disease present.  Skin:     General: Skin is warm and dry.      Findings: No rash.   Neurological:      Mental Status: She is alert and oriented to person, place, and time. Mental status is at baseline.      Gait: Gait abnormal.   Psychiatric:         Attention and Perception: Attention and perception normal.         Mood and Affect: Mood and affect normal.         Speech: Speech normal.         Behavior: Behavior normal. Behavior is cooperative.          Thought Content: Thought content normal.         Cognition and Memory: Cognition and memory normal.         Judgment: Judgment normal.       Physical Exam       BMI is within normal parameters. No other follow-up for BMI required.      Assessment & Plan   Diagnoses and all orders for this visit:    1. Cellulitis of right lower extremity (Primary)    2. Post-polio syndrome    3. Onychomycosis    4. Tinea pedis of both feet    5. Disuse osteoporosis    Other orders  -     terbinafine (lamiSIL) 250 MG tablet; Take 1 tablet by mouth Daily.  Dispense: 14 tablet; Refill: 0  -     levothyroxine (SYNTHROID, LEVOTHROID) 137 MCG tablet; Take 1 tablet by mouth Daily.  Dispense: 90 tablet; Refill: 2  -     esomeprazole (nexIUM) 40 MG capsule; Take 1 capsule by mouth Every Morning Before Breakfast.  Dispense: 90 capsule; Refill: 1  -     gabapentin (NEURONTIN) 100 MG capsule; Take 2 capsules by mouth 2 (Two) Times a Day.  Dispense: 120 capsule; Refill: 1      Assessment & Plan  1. Cellulitis of the right lower extremity.  The patient's leg appears to be in good condition, albeit with residual swelling. The patient was informed that the prescribed medication should resolve the cellulitis within a few weeks, with the exception of the swelling. She was also advised to wear compression hose.    2. Onychomycosis or psoriasis.  An antifungal medication was prescribed, to be taken once daily for a duration of 2 weeks. The patient will inform us if she decides to increase the dosage of her medication or if she wishes to have her fingernails cleared    3. Hypothyroidism.  A 9-month refill of her thyroid medication was provided.     Results  Imaging  Ultrasound of right leg was negative for blood clot. X-ray of foot showed soft tissue was okay, no foreign body, no joint dislocation, arthritic changes of the foot itself, no fracture. Ankle x-ray showed no fracture.          Patient or patient representative verbalized consent for the use  of Ambient Listening during the visit with  Aditi Agarwal DO for chart documentation. 5/18/2024  11:23 EDT

## 2024-05-21 ENCOUNTER — TELEPHONE (OUTPATIENT)
Dept: FAMILY MEDICINE CLINIC | Facility: CLINIC | Age: 84
End: 2024-05-21
Payer: MEDICARE

## 2024-05-21 NOTE — TELEPHONE ENCOUNTER
Caller: YARON      Best call back number: [phone]       Do you know the name of the person who called: JOVANNA    What was the call regarding: CALLER IS REQUESTING A STATUS UPDATE ON A FAX SENT OVER FOR PATIENTS PRESCRIPTIONS AND CHART NOTES    PLEASE ADVISE

## 2024-06-03 DIAGNOSIS — F51.01 PRIMARY INSOMNIA: ICD-10-CM

## 2024-06-03 RX ORDER — ZOLPIDEM TARTRATE 10 MG/1
10 TABLET ORAL
Qty: 30 TABLET | Refills: 0 | Status: SHIPPED | OUTPATIENT
Start: 2024-06-03

## 2024-06-04 ENCOUNTER — OFFICE VISIT (OUTPATIENT)
Dept: FAMILY MEDICINE CLINIC | Facility: CLINIC | Age: 84
End: 2024-06-04
Payer: MEDICARE

## 2024-06-04 VITALS
WEIGHT: 125 LBS | HEART RATE: 84 BPM | BODY MASS INDEX: 23 KG/M2 | HEIGHT: 62 IN | DIASTOLIC BLOOD PRESSURE: 82 MMHG | OXYGEN SATURATION: 100 % | TEMPERATURE: 98.2 F | SYSTOLIC BLOOD PRESSURE: 161 MMHG

## 2024-06-04 DIAGNOSIS — F41.9 ANXIETY: ICD-10-CM

## 2024-06-04 DIAGNOSIS — G82.21 PARAPLEGIA, COMPLETE: ICD-10-CM

## 2024-06-04 DIAGNOSIS — M81.8 OSTEOPOROSIS OF DISUSE: ICD-10-CM

## 2024-06-04 DIAGNOSIS — G14 POST POLIOMYELITIS SYNDROME: Primary | ICD-10-CM

## 2024-06-04 DIAGNOSIS — B35.1 ONYCHOMYCOSIS: ICD-10-CM

## 2024-06-04 PROCEDURE — 1160F RVW MEDS BY RX/DR IN RCRD: CPT | Performed by: FAMILY MEDICINE

## 2024-06-04 PROCEDURE — 1159F MED LIST DOCD IN RCRD: CPT | Performed by: FAMILY MEDICINE

## 2024-06-04 PROCEDURE — 99214 OFFICE O/P EST MOD 30 MIN: CPT | Performed by: FAMILY MEDICINE

## 2024-06-04 PROCEDURE — 1125F AMNT PAIN NOTED PAIN PRSNT: CPT | Performed by: FAMILY MEDICINE

## 2024-06-04 RX ORDER — TERBINAFINE HYDROCHLORIDE 250 MG/1
250 TABLET ORAL DAILY
Qty: 14 TABLET | Refills: 0 | Status: SHIPPED | OUTPATIENT
Start: 2024-06-04

## 2024-06-04 RX ORDER — ATORVASTATIN CALCIUM 10 MG/1
10 TABLET, FILM COATED ORAL DAILY
COMMUNITY

## 2024-06-04 RX ORDER — HYDROCODONE BITARTRATE AND ACETAMINOPHEN 10; 325 MG/1; MG/1
1 TABLET ORAL DAILY PRN
Qty: 30 TABLET | Refills: 0 | Status: SHIPPED | OUTPATIENT
Start: 2024-06-04

## 2024-06-04 RX ORDER — METHOCARBAMOL 500 MG/1
500 TABLET, FILM COATED ORAL AS NEEDED
COMMUNITY
Start: 2024-04-05

## 2024-06-04 RX ORDER — DIAZEPAM 5 MG/1
TABLET ORAL
Qty: 30 TABLET | Refills: 0 | Status: SHIPPED | OUTPATIENT
Start: 2024-06-04

## 2024-06-04 RX ORDER — PSEUDOEPHEDRINE HCL 30 MG
100 TABLET ORAL AS NEEDED
COMMUNITY

## 2024-06-04 RX ORDER — FENTANYL 100 UG/1
1 PATCH TRANSDERMAL
Qty: 10 PATCH | Refills: 0 | Status: SHIPPED | OUTPATIENT
Start: 2024-06-04

## 2024-06-04 NOTE — PROGRESS NOTES
Subjective   Karina Saleem is a 83 y.o. female.     Chief Complaint   Patient presents with    discuss paperwork for wheelchair         Current Outpatient Medications:     acetaminophen (TYLENOL) 325 MG tablet, Take 2 tablets by mouth Every 8 (Eight) Hours As Needed for Mild Pain., Disp: , Rfl:     atorvastatin (LIPITOR) 10 MG tablet, Take 1 tablet by mouth Daily., Disp: , Rfl:     bismuth subsalicylate (PEPTO BISMOL) 262 MG chewable tablet, Chew 1 tablet Daily As Needed for Indigestion. 15ML every 8 hours as needed for stomach discomfort, Disp: , Rfl:     Levi Carb-Mag Hydrox-Simeth (Mylanta Coat & Cool) 1200-270-80 MG/10ML suspension, Take 10 mL by mouth Daily As Needed (GERD). Give 20ML by mouth every 4 hours as needed for indigestion, Disp: , Rfl:     Cholecalciferol 50 MCG (2000 UT) tablet, Take 1 tablet by mouth Daily., Disp: , Rfl:     diazePAM (VALIUM) 5 MG tablet, 1 po q dinner prn, Disp: 30 tablet, Rfl: 0    docusate sodium 100 MG capsule, Take 1 capsule by mouth As Needed., Disp: , Rfl:     Ergocalciferol (Vitamin D2) 50 MCG (2000 UT) tablet, Take 1 tablet by mouth Daily., Disp: 90 tablet, Rfl: 1    esomeprazole (nexIUM) 40 MG capsule, Take 1 capsule by mouth Every Morning Before Breakfast., Disp: 90 capsule, Rfl: 1    fentaNYL (DURAGESIC) 100 MCG/HR patch, Place 1 patch on the skin as directed by provider Every 72 (Seventy-Two) Hours., Disp: 10 patch, Rfl: 0    gabapentin (NEURONTIN) 100 MG capsule, Take 2 capsules by mouth 2 (Two) Times a Day., Disp: 120 capsule, Rfl: 1    HYDROcodone-acetaminophen (NORCO)  MG per tablet, Take 1 tablet by mouth Daily As Needed for Severe Pain., Disp: 30 tablet, Rfl: 0    lactulose (CHRONULAC) 10 GM/15ML solution, Take 30 mL by mouth 2 (Two) Times a Day As Needed (constipation)., Disp: 473 mL, Rfl: 0    levothyroxine (SYNTHROID, LEVOTHROID) 137 MCG tablet, Take 1 tablet by mouth Daily., Disp: 90 tablet, Rfl: 2    methocarbamol (ROBAXIN) 500 MG tablet, Take 1  tablet by mouth As Needed., Disp: , Rfl:     naloxone (NARCAN) 4 MG/0.1ML nasal spray, 1 spray into the nostril(s) as directed by provider As Needed., Disp: , Rfl:     nitroglycerin (NITROSTAT) 0.4 MG SL tablet, DISSOLVE 1 TABLET UNDER THE TONGUE EVERY 5 MINUTES AS NEEDED FOR CHEST PAIN. DO NOT EXCEED A TOTAL OF 3 DOSES IN 15 MINUTES., Disp: 50 tablet, Rfl: 1    terbinafine (lamiSIL) 250 MG tablet, Take 1 tablet by mouth Daily., Disp: 14 tablet, Rfl: 0    zolpidem (AMBIEN) 10 MG tablet, Take 1 tablet by mouth every night at bedtime., Disp: 30 tablet, Rfl: 0    Vitamins A & D (magic barrier cream), Apply 1 Application topically to the appropriate area as directed 2 (Two) Times a Day As Needed (butt irritation)., Disp: 100 g, Rfl: 1    Past Medical History:   Diagnosis Date    Anxiety     Cataracts, bilateral     Femur fracture, right     Hypothyroidism     Insomnia     Kyphoscoliosis     MAMMO     NEG = 2019    Osteoporosis     Paraplegic immobility syndrome     Peripheral neuropathy     Polio osteopathy of lower leg, left     Polio osteopathy of lower leg, right     Scoliosis        Past Surgical History:   Procedure Laterality Date    APPENDECTOMY      LIPOMA EXCISION Bilateral     breast    TOTAL ABDOMINAL HYSTERECTOMY         Family History   Problem Relation Age of Onset    Diabetes Mother     Heart disease Mother         CHF    Heart failure Father     Heart disease Father     COPD Father         BLACK LUNG    Lymphoma Sister     Lung cancer Brother     Pancreatic cancer Brother     Bone cancer Brother     Cancer Brother        Social History     Socioeconomic History    Marital status: Single   Tobacco Use    Smoking status: Never     Passive exposure: Never    Smokeless tobacco: Never   Vaping Use    Vaping status: Never Used   Substance and Sexual Activity    Alcohol use: No    Drug use: No    Sexual activity: Defer       History of Present Illness  The patient is an 83-year-old  female with history  of polio as a child, here for evaluation for a new UltraLight manual wheelchair for assist in ADLs in her home where she lives alone.    The patient's current manual wheelchair, which is between 5 to 10 years old, requires replacement.  States she wants a manual wheelchair in order to keep her upper body strong so she can stay in her own apt.  She reports no alterations in her medication regimen, although she has exhausted her supply of medications. She continues to use Duragesic patches. Despite contacting the pharmacy yesterday, she was informed that no order had been issued. She reports that Ambien aids in restful sleep. She does not require lactulose for constipation and does not require nitroglycerin for chest pain.     She experiences foot soreness when seated in her wheelchair and requests a topical analgesic. She expresses a preference for Magic Butt Cream. She has been experiencing leg swelling for several weeks, for which she was prescribed antibiotics. Her right leg is smaller than the left, and she experiences pain radiating up and down her leg. She fractured her ankle several years ago, which was treated by Dr. Ivory. An x-ray performed at the ER was negative for fracture recently. She has toenail cutters, but due to her leg condition, she has been unable to bend over. She has not been washing her legs, opting instead to apply cream. She elevates her feet at night. Her feet are small in the morning, and they swell after hanging her legs down during the day.     She receives home health care 3 days a week, with the next session scheduled for tomorrow. She requires assistance with putting on her socks. She reports normal sensation in her legs but just can't move them.    During her last visit, she was prescribed a cream for her hand. She denies any itching. She did not take the prescribed medication that was previously ordered on 05/09/2024.    Supplemental Information  She had been going to  orthopedics for years. When she went to him for her shoulder pain, they just x-rayed her and gave her an injection. She was told that they can do this every 2 to 3 months. She had a bruise on her face and arm from the hospital. She did not fall. She just went to [facility] for therapy, which helped her. She was in the hospital for a 3-day period a while back. They thought she might have had a heart problem. They wanted her to follow up in 6 weeks, but they never called her to make an appointment. They checked her heart and everything was fine.        The following portions of the patient's history were reviewed and updated as appropriate: allergies, current medications, past family history, past medical history, past social history, past surgical history and problem list.    Review of Systems   Constitutional:  Negative for activity change, appetite change, unexpected weight gain and unexpected weight loss.   Cardiovascular:  Positive for leg swelling.   Musculoskeletal:  Positive for arthralgias, back pain and gait problem.   Skin:  Positive for dry skin. Negative for color change, rash and wound.       Vitals:    06/04/24 1250   BP: 161/82   Pulse: 84   Temp: 98.2 °F (36.8 °C)   SpO2: 100%       Objective   Physical Exam  Vitals and nursing note reviewed.   Constitutional:       General: She is not in acute distress.     Appearance: She is not ill-appearing or toxic-appearing.   Cardiovascular:      Rate and Rhythm: Normal rate and regular rhythm.      Heart sounds: No murmur heard.  Pulmonary:      Effort: Pulmonary effort is normal. No respiratory distress.      Breath sounds: No wheezing, rhonchi or rales.   Musculoskeletal:         General: Deformity (b/l foot w/ internal rotation) present.      Thoracic back: Deformity present. Scoliosis present.      Lumbar back: Deformity present. Scoliosis present.      Right lower leg: Edema present.      Left lower leg: Edema present.      Right foot: Swelling and deformity  present.      Left foot: Swelling and deformity present.   Feet:      Right foot:      Skin integrity: Dry skin present. No erythema.      Toenail Condition: Right toenails are abnormally thick and long. Fungal disease present.     Left foot:      Skin integrity: Dry skin present. No erythema.      Toenail Condition: Left toenails are abnormally thick and long. Fungal disease present.  Skin:     Findings: Ecchymosis present. No bruising, erythema or rash.             Comments: 3-4 cm round ecchymosis on Rt mid forearm   Neurological:      Mental Status: She is alert and oriented to person, place, and time.      Cranial Nerves: No cranial nerve deficit.      Gait: Gait abnormal (Pt sitting in manual wheelchair).   Psychiatric:         Mood and Affect: Mood normal.         Behavior: Behavior normal.         Thought Content: Thought content normal.         Judgment: Judgment normal.       Physical Exam       BMI is within normal parameters. No other follow-up for BMI required.      Assessment & Plan   Diagnoses and all orders for this visit:    1. Post poliomyelitis syndrome (Primary)  -     fentaNYL (DURAGESIC) 100 MCG/HR patch; Place 1 patch on the skin as directed by provider Every 72 (Seventy-Two) Hours.  Dispense: 10 patch; Refill: 0  -     HYDROcodone-acetaminophen (NORCO)  MG per tablet; Take 1 tablet by mouth Daily As Needed for Severe Pain.  Dispense: 30 tablet; Refill: 0    2. Paraplegia, complete    3. Osteoporosis of disuse    4. Anxiety  -     diazePAM (VALIUM) 5 MG tablet; 1 po q dinner prn  Dispense: 30 tablet; Refill: 0    5. Onychomycosis    Other orders  -     Vitamins A & D (magic barrier cream); Apply 1 Application topically to the appropriate area as directed 2 (Two) Times a Day As Needed (butt irritation).  Dispense: 100 g; Refill: 1  -     terbinafine (lamiSIL) 250 MG tablet; Take 1 tablet by mouth Daily.  Dispense: 14 tablet; Refill: 0      Assessment & Plan  1. Requirement for replacement  of an UltraLight manual wheelchair.  The patient was provided with a lighter wheelchair, which is equipped with a lighter height and depth adjustable wheelchair.    2. Medication refill.  The patient's medications, including Duragesic patches, pain medications, and Valium, were refilled to manage her pain. A compounded cream was prescribed through [employer]. A printed copy of the prescription will be faxed to [employer].    3. Dependent edema.  The patient's dependent edema is unlikely to be cellulitis. The patient was advised to elevate her legs during the day to mitigate the swelling. She was also provided with how to wrap her foot.    4. Onychomycosis.  A prescription for a 2-week course of antifungal medication, to be taken once daily, was issued on 05/09/2024. Should the medication prove beneficial, the patient will contact me for further treatment of her nails.     Results  Imaging  X-ray of the leg was negative for fracture.     Paperwork for new manual wheelchair sent to maintain her independence in ADL's  No way can pt use a cane or walker for ambulation     Patient or patient representative verbalized consent for the use of Ambient Listening during the visit with  Aditi Agarwal DO for chart documentation. 6/9/2024  13:47 EDT

## 2024-06-05 ENCOUNTER — TELEPHONE (OUTPATIENT)
Dept: FAMILY MEDICINE CLINIC | Facility: CLINIC | Age: 84
End: 2024-06-05

## 2024-06-05 ENCOUNTER — TELEPHONE (OUTPATIENT)
Dept: FAMILY MEDICINE CLINIC | Facility: CLINIC | Age: 84
End: 2024-06-05
Payer: MEDICARE

## 2024-06-05 NOTE — TELEPHONE ENCOUNTER
Caller: Karina Saleem    Relationship to patient: Self    Best call back number: [phone]    Patient is needing: SHE DOES NOT WANT ANY INFORMATION RELEASED TO [facility].  SHE IS NOT ON THE VERBAL EITHER.

## 2024-06-05 NOTE — TELEPHONE ENCOUNTER
Caller: JOVANNA    Relationship: Other    Best call back number: [phone]    What form or medical record are you requesting: LAST OFFICE VISIT CHART NOTES, AND A PRESCRIPTION FOR A MANUAL WHEELCHAIR     Who is requesting this form or medical record from you: LIV     How would you like to receive the form or medical records (pick-up, mail, fax): FAX  If fax, what is the fax number: [phone]      Timeframe paperwork needed: ASAP    Additional notes: REQUESTED INFORMATION TO BE ABLE TO GET THE PATIENT A POWER WHEELCHAIR

## 2024-06-13 ENCOUNTER — TELEPHONE (OUTPATIENT)
Dept: FAMILY MEDICINE CLINIC | Facility: CLINIC | Age: 84
End: 2024-06-13
Payer: MEDICARE

## 2024-06-13 NOTE — TELEPHONE ENCOUNTER
Patient states that at her appt on 6/4/2024 you were sending her in some magic barrier cream for her hand.  Pharmacy never received.  Patient requesting RX be resent.

## 2024-06-21 ENCOUNTER — TELEPHONE (OUTPATIENT)
Dept: FAMILY MEDICINE CLINIC | Facility: CLINIC | Age: 84
End: 2024-06-21

## 2024-06-21 NOTE — TELEPHONE ENCOUNTER
Caller: Karina Saleem    Relationship: Self    Best call back number:[phone] /FACILITY WHERE SHE IS STAYING     What medication are you requesting: SOMETHING FOR CONSTIPATION. PATIENT STATES SHE IS IN A FACILITY AND NOTHING THEY ARE GIVING HER HELPS. SHE WOULD LIKE A RX OR SOME ADVICE. PLEASE CALL.     If a prescription is needed, what is your preferred pharmacy and phone number:  [id number] [place] [address] [place] [address] AT [address] [place] - [phone] [facility] [phone]  [phone]

## 2024-06-21 NOTE — TELEPHONE ENCOUNTER
CLAUDIA nurse Minoo at [facility]. States that pt had daily routine order of colace and miralax that pt refuses to take. They can add an order in for a dose of Lactulose, but wanted to let us know that. I informed the nurse of what Dr Agarwal advised about asking the Dr of facility to help as they are responsible for her care.

## 2024-07-01 ENCOUNTER — INPATIENT HOSPITAL (OUTPATIENT)
Dept: URBAN - METROPOLITAN AREA HOSPITAL 76 | Facility: HOSPITAL | Age: 84
End: 2024-07-01
Payer: MEDICAID

## 2024-07-01 DIAGNOSIS — R93.3 ABNORMAL FINDINGS ON DIAGNOSTIC IMAGING OF OTHER PARTS OF DI: ICD-10-CM

## 2024-07-01 DIAGNOSIS — K59.09 OTHER CONSTIPATION: ICD-10-CM

## 2024-07-01 DIAGNOSIS — R74.8 ABNORMAL LEVELS OF OTHER SERUM ENZYMES: ICD-10-CM

## 2024-07-01 DIAGNOSIS — K63.9 DISEASE OF INTESTINE, UNSPECIFIED: ICD-10-CM

## 2024-07-01 DIAGNOSIS — R15.9 FULL INCONTINENCE OF FECES: ICD-10-CM

## 2024-07-01 PROCEDURE — 99221 1ST HOSP IP/OBS SF/LOW 40: CPT | Performed by: INTERNAL MEDICINE

## 2024-07-02 ENCOUNTER — TELEPHONE (OUTPATIENT)
Dept: FAMILY MEDICINE CLINIC | Facility: CLINIC | Age: 84
End: 2024-07-02

## 2024-07-02 NOTE — TELEPHONE ENCOUNTER
Caller: Karina Saleem    Relationship: Self    Best call back number: [phone]    What is the medical concern/diagnosis: PATIENT IS CURRENTLY AT HOSPITAL AND THEY ARE WANTING TO SEND HER TO A REHAB CENTER. PLEASE ADVISE IF DR. CARRERO IS FAMILIAR WITH THE PLACES THEY HAVE RECOMMENDED. PLEASE ADVISE WHERE DR. CARRERO RECOMMENDS.     What specialty or service is being requested: REHAB    What is the provider, practice or medical service name: [facility]

## 2024-07-03 NOTE — TELEPHONE ENCOUNTER
RELAY    [facility] - Dr Agarwal      Attempted to call back at number provided. Unable to contact

## 2024-07-15 ENCOUNTER — TELEPHONE (OUTPATIENT)
Dept: FAMILY MEDICINE CLINIC | Facility: CLINIC | Age: 84
End: 2024-07-15

## 2024-07-15 NOTE — TELEPHONE ENCOUNTER
Caller: CHACHO( OR NURSE ON CALL) - [employer]    Relationship: Other    Best call back number: [phone]     Who are you requesting to speak with (clinical staff, provider,  specific staff member): CLINICAL STAFF     What was the call regarding: HAS QUESTIONS ON terbinafine (lamiSIL) 250 MG tablet - NEED A STOP DATE AND/ OR DURATION OF MEDICATION

## 2024-07-15 NOTE — TELEPHONE ENCOUNTER
Caller: Karina Saleem    Relationship: Self    Best call back number:   [phone]       What is the best time to reach you: ANYTIME    Who are you requesting to speak with (clinical staff, provider,  specific staff member): CLINICAL    What was the call regarding: PATIENT CALLING WANTING TO KNOW IF  PRESCRIBED AN ANTIFUNGAL MEDICATION SHE DOES NOT KNOW THAT NAME OF THE MEDICATION SHE IS CURRENTLY IN REHAB     Is it okay if the provider responds through MyChart: NO

## 2024-08-11 ENCOUNTER — APPOINTMENT (OUTPATIENT)
Dept: GENERAL RADIOLOGY | Facility: HOSPITAL | Age: 84
DRG: 871 | End: 2024-08-11
Payer: MEDICARE

## 2024-08-11 ENCOUNTER — HOSPITAL ENCOUNTER (INPATIENT)
Facility: HOSPITAL | Age: 84
LOS: 5 days | Discharge: NURSING, REHAB OR LONG-TERM CARE | DRG: 871 | End: 2024-08-16
Attending: EMERGENCY MEDICINE | Admitting: INTERNAL MEDICINE
Payer: MEDICARE

## 2024-08-11 DIAGNOSIS — J18.9 PNEUMONIA DUE TO INFECTIOUS ORGANISM, UNSPECIFIED LATERALITY, UNSPECIFIED PART OF LUNG: Primary | ICD-10-CM

## 2024-08-11 LAB
ALBUMIN SERPL-MCNC: 3.6 G/DL (ref 3.5–5.2)
ALBUMIN/GLOB SERPL: 1.3 G/DL
ALP SERPL-CCNC: 80 U/L (ref 39–117)
ALT SERPL W P-5'-P-CCNC: 6 U/L (ref 1–33)
ANION GAP SERPL CALCULATED.3IONS-SCNC: 11.8 MMOL/L (ref 5–15)
APTT PPP: 29.4 SECONDS (ref 61–76.5)
ARTERIAL PATENCY WRIST A: POSITIVE
AST SERPL-CCNC: 19 U/L (ref 1–32)
ATMOSPHERIC PRESS: ABNORMAL MM[HG]
B PARAPERT DNA SPEC QL NAA+PROBE: NOT DETECTED
B PERT DNA SPEC QL NAA+PROBE: NOT DETECTED
BACTERIA UR QL AUTO: NORMAL /HPF
BASE EXCESS BLDA CALC-SCNC: -1.5 MMOL/L (ref 0–3)
BASOPHILS # BLD AUTO: 0.03 10*3/MM3 (ref 0–0.2)
BASOPHILS NFR BLD AUTO: 0.2 % (ref 0–1.5)
BDY SITE: ABNORMAL
BILIRUB SERPL-MCNC: 0.4 MG/DL (ref 0–1.2)
BILIRUB UR QL STRIP: NEGATIVE
BUN SERPL-MCNC: 13 MG/DL (ref 8–23)
BUN/CREAT SERPL: 36.1 (ref 7–25)
C PNEUM DNA NPH QL NAA+NON-PROBE: NOT DETECTED
CALCIUM SPEC-SCNC: 8.7 MG/DL (ref 8.6–10.5)
CHLORIDE SERPL-SCNC: 98 MMOL/L (ref 98–107)
CLARITY UR: CLEAR
CO2 BLDA-SCNC: 23.2 MMOL/L (ref 22–29)
CO2 SERPL-SCNC: 20.2 MMOL/L (ref 22–29)
COLOR UR: YELLOW
CREAT SERPL-MCNC: 0.36 MG/DL (ref 0.57–1)
D-LACTATE SERPL-SCNC: 2.1 MMOL/L (ref 0.3–2)
D-LACTATE SERPL-SCNC: 2.2 MMOL/L (ref 0.5–2)
D-LACTATE SERPL-SCNC: 3.9 MMOL/L (ref 0.5–2)
DEPRECATED RDW RBC AUTO: 47.8 FL (ref 37–54)
EGFRCR SERPLBLD CKD-EPI 2021: 99.6 ML/MIN/1.73
EOSINOPHIL # BLD AUTO: 0.07 10*3/MM3 (ref 0–0.4)
EOSINOPHIL NFR BLD AUTO: 0.5 % (ref 0.3–6.2)
ERYTHROCYTE [DISTWIDTH] IN BLOOD BY AUTOMATED COUNT: 13.4 % (ref 12.3–15.4)
FLUAV SUBTYP SPEC NAA+PROBE: NOT DETECTED
FLUBV RNA ISLT QL NAA+PROBE: NOT DETECTED
GEN 5 2HR TROPONIN T REFLEX: 18 NG/L
GLOBULIN UR ELPH-MCNC: 2.7 GM/DL
GLUCOSE SERPL-MCNC: 116 MG/DL (ref 65–99)
GLUCOSE UR STRIP-MCNC: NEGATIVE MG/DL
HADV DNA SPEC NAA+PROBE: NOT DETECTED
HCO3 BLDA-SCNC: 22.2 MMOL/L (ref 21–28)
HCOV 229E RNA SPEC QL NAA+PROBE: NOT DETECTED
HCOV HKU1 RNA SPEC QL NAA+PROBE: NOT DETECTED
HCOV NL63 RNA SPEC QL NAA+PROBE: NOT DETECTED
HCOV OC43 RNA SPEC QL NAA+PROBE: NOT DETECTED
HCT VFR BLD AUTO: 44.2 % (ref 34–46.6)
HEMODILUTION: NO
HGB BLD-MCNC: 13.4 G/DL (ref 12–15.9)
HGB UR QL STRIP.AUTO: NEGATIVE
HMPV RNA NPH QL NAA+NON-PROBE: NOT DETECTED
HPIV1 RNA ISLT QL NAA+PROBE: NOT DETECTED
HPIV2 RNA SPEC QL NAA+PROBE: NOT DETECTED
HPIV3 RNA NPH QL NAA+PROBE: NOT DETECTED
HPIV4 P GENE NPH QL NAA+PROBE: NOT DETECTED
HYALINE CASTS UR QL AUTO: NORMAL /LPF
IMM GRANULOCYTES # BLD AUTO: 0.08 10*3/MM3 (ref 0–0.05)
IMM GRANULOCYTES NFR BLD AUTO: 0.5 % (ref 0–0.5)
INHALED O2 CONCENTRATION: 28 %
INR PPP: 1.01 (ref 0.93–1.1)
KETONES UR QL STRIP: NEGATIVE
LEUKOCYTE ESTERASE UR QL STRIP.AUTO: ABNORMAL
LYMPHOCYTES # BLD AUTO: 0.75 10*3/MM3 (ref 0.7–3.1)
LYMPHOCYTES NFR BLD AUTO: 5 % (ref 19.6–45.3)
M PNEUMO IGG SER IA-ACNC: NOT DETECTED
MCH RBC QN AUTO: 29.1 PG (ref 26.6–33)
MCHC RBC AUTO-ENTMCNC: 30.3 G/DL (ref 31.5–35.7)
MCV RBC AUTO: 96.1 FL (ref 79–97)
MODALITY: ABNORMAL
MONOCYTES # BLD AUTO: 0.37 10*3/MM3 (ref 0.1–0.9)
MONOCYTES NFR BLD AUTO: 2.5 % (ref 5–12)
MRSA DNA SPEC QL NAA+PROBE: NORMAL
NEUTROPHILS NFR BLD AUTO: 13.72 10*3/MM3 (ref 1.7–7)
NEUTROPHILS NFR BLD AUTO: 91.3 % (ref 42.7–76)
NITRITE UR QL STRIP: NEGATIVE
NRBC BLD AUTO-RTO: 0 /100 WBC (ref 0–0.2)
NT-PROBNP SERPL-MCNC: 243 PG/ML (ref 0–1800)
PCO2 BLDA: 33.6 MM HG (ref 35–48)
PH BLDA: 7.43 PH UNITS (ref 7.35–7.45)
PH UR STRIP.AUTO: 5.5 [PH] (ref 5–8)
PLATELET # BLD AUTO: 200 10*3/MM3 (ref 140–450)
PMV BLD AUTO: 10.3 FL (ref 6–12)
PO2 BLD: 208 MM[HG] (ref 0–500)
PO2 BLDA: 58.2 MM HG (ref 83–108)
POTASSIUM SERPL-SCNC: 4.5 MMOL/L (ref 3.5–5.2)
PROCALCITONIN SERPL-MCNC: 0.47 NG/ML (ref 0–0.25)
PROT SERPL-MCNC: 6.3 G/DL (ref 6–8.5)
PROT UR QL STRIP: NEGATIVE
PROTHROMBIN TIME: 11 SECONDS (ref 9.6–11.7)
RBC # BLD AUTO: 4.6 10*6/MM3 (ref 3.77–5.28)
RBC # UR STRIP: NORMAL /HPF
REF LAB TEST METHOD: NORMAL
RHINOVIRUS RNA SPEC NAA+PROBE: NOT DETECTED
RSV RNA NPH QL NAA+NON-PROBE: NOT DETECTED
SAO2 % BLDCOA: 90.8 % (ref 94–98)
SARS-COV-2 RNA NPH QL NAA+NON-PROBE: NOT DETECTED
SODIUM SERPL-SCNC: 130 MMOL/L (ref 136–145)
SP GR UR STRIP: 1.02 (ref 1–1.03)
SQUAMOUS #/AREA URNS HPF: NORMAL /HPF
TROPONIN T DELTA: 2 NG/L
TROPONIN T SERPL HS-MCNC: 16 NG/L
UROBILINOGEN UR QL STRIP: ABNORMAL
WBC # UR STRIP: NORMAL /HPF
WBC NRBC COR # BLD AUTO: 15.02 10*3/MM3 (ref 3.4–10.8)

## 2024-08-11 PROCEDURE — 36415 COLL VENOUS BLD VENIPUNCTURE: CPT

## 2024-08-11 PROCEDURE — 99285 EMERGENCY DEPT VISIT HI MDM: CPT

## 2024-08-11 PROCEDURE — 25810000003 LACTATED RINGERS PER 1000 ML: Performed by: INTERNAL MEDICINE

## 2024-08-11 PROCEDURE — 25810000003 SODIUM CHLORIDE 0.9 % SOLUTION 250 ML FLEX CONT: Performed by: EMERGENCY MEDICINE

## 2024-08-11 PROCEDURE — 87040 BLOOD CULTURE FOR BACTERIA: CPT | Performed by: EMERGENCY MEDICINE

## 2024-08-11 PROCEDURE — 84484 ASSAY OF TROPONIN QUANT: CPT | Performed by: EMERGENCY MEDICINE

## 2024-08-11 PROCEDURE — 36600 WITHDRAWAL OF ARTERIAL BLOOD: CPT | Performed by: EMERGENCY MEDICINE

## 2024-08-11 PROCEDURE — 25010000002 PIPERACILLIN SOD-TAZOBACTAM PER 1 G

## 2024-08-11 PROCEDURE — 93005 ELECTROCARDIOGRAM TRACING: CPT | Performed by: EMERGENCY MEDICINE

## 2024-08-11 PROCEDURE — 83605 ASSAY OF LACTIC ACID: CPT

## 2024-08-11 PROCEDURE — 81001 URINALYSIS AUTO W/SCOPE: CPT | Performed by: EMERGENCY MEDICINE

## 2024-08-11 PROCEDURE — 85730 THROMBOPLASTIN TIME PARTIAL: CPT | Performed by: EMERGENCY MEDICINE

## 2024-08-11 PROCEDURE — 84145 PROCALCITONIN (PCT): CPT

## 2024-08-11 PROCEDURE — 25810000003 SODIUM CHLORIDE 0.9 % SOLUTION: Performed by: EMERGENCY MEDICINE

## 2024-08-11 PROCEDURE — 82803 BLOOD GASES ANY COMBINATION: CPT | Performed by: EMERGENCY MEDICINE

## 2024-08-11 PROCEDURE — 25810000003 SEPSIS FLUID NS 0.9 % SOLUTION

## 2024-08-11 PROCEDURE — 80053 COMPREHEN METABOLIC PANEL: CPT | Performed by: EMERGENCY MEDICINE

## 2024-08-11 PROCEDURE — 71045 X-RAY EXAM CHEST 1 VIEW: CPT

## 2024-08-11 PROCEDURE — 87641 MR-STAPH DNA AMP PROBE: CPT

## 2024-08-11 PROCEDURE — 83880 ASSAY OF NATRIURETIC PEPTIDE: CPT | Performed by: EMERGENCY MEDICINE

## 2024-08-11 PROCEDURE — 25010000002 PIPERACILLIN SOD-TAZOBACTAM PER 1 G: Performed by: EMERGENCY MEDICINE

## 2024-08-11 PROCEDURE — 85610 PROTHROMBIN TIME: CPT | Performed by: EMERGENCY MEDICINE

## 2024-08-11 PROCEDURE — 85025 COMPLETE CBC W/AUTO DIFF WBC: CPT | Performed by: EMERGENCY MEDICINE

## 2024-08-11 PROCEDURE — 0202U NFCT DS 22 TRGT SARS-COV-2: CPT | Performed by: EMERGENCY MEDICINE

## 2024-08-11 PROCEDURE — 25810000003 SODIUM CHLORIDE 0.9 % SOLUTION: Performed by: STUDENT IN AN ORGANIZED HEALTH CARE EDUCATION/TRAINING PROGRAM

## 2024-08-11 PROCEDURE — 25010000002 VANCOMYCIN HCL 1.25 G RECONSTITUTED SOLUTION 1 EACH VIAL: Performed by: EMERGENCY MEDICINE

## 2024-08-11 RX ORDER — ACETAMINOPHEN 325 MG/1
650 TABLET ORAL EVERY 4 HOURS PRN
Status: DISCONTINUED | OUTPATIENT
Start: 2024-08-11 | End: 2024-08-16 | Stop reason: HOSPADM

## 2024-08-11 RX ORDER — SODIUM CHLORIDE 0.9 % (FLUSH) 0.9 %
10 SYRINGE (ML) INJECTION AS NEEDED
Status: DISCONTINUED | OUTPATIENT
Start: 2024-08-11 | End: 2024-08-16 | Stop reason: HOSPADM

## 2024-08-11 RX ORDER — ACETAMINOPHEN 500 MG
1000 TABLET ORAL ONCE
Status: COMPLETED | OUTPATIENT
Start: 2024-08-11 | End: 2024-08-11

## 2024-08-11 RX ORDER — ONDANSETRON 8 MG/1
8 TABLET, ORALLY DISINTEGRATING ORAL EVERY 4 HOURS PRN
COMMUNITY

## 2024-08-11 RX ORDER — BISACODYL 10 MG
10 SUPPOSITORY, RECTAL RECTAL DAILY PRN
Status: DISCONTINUED | OUTPATIENT
Start: 2024-08-11 | End: 2024-08-16 | Stop reason: HOSPADM

## 2024-08-11 RX ORDER — GABAPENTIN 400 MG/1
400 CAPSULE ORAL 3 TIMES DAILY
COMMUNITY

## 2024-08-11 RX ORDER — ONDANSETRON 2 MG/ML
4 INJECTION INTRAMUSCULAR; INTRAVENOUS EVERY 6 HOURS PRN
Status: DISCONTINUED | OUTPATIENT
Start: 2024-08-11 | End: 2024-08-16 | Stop reason: HOSPADM

## 2024-08-11 RX ORDER — BISACODYL 5 MG/1
5 TABLET, DELAYED RELEASE ORAL DAILY PRN
Status: DISCONTINUED | OUTPATIENT
Start: 2024-08-11 | End: 2024-08-16 | Stop reason: HOSPADM

## 2024-08-11 RX ORDER — ENOXAPARIN SODIUM 100 MG/ML
40 INJECTION SUBCUTANEOUS DAILY
COMMUNITY

## 2024-08-11 RX ORDER — OXYCODONE AND ACETAMINOPHEN 10; 325 MG/1; MG/1
1 TABLET ORAL EVERY 4 HOURS PRN
COMMUNITY

## 2024-08-11 RX ORDER — DIAZEPAM 5 MG/1
5 TABLET ORAL DAILY
COMMUNITY

## 2024-08-11 RX ORDER — SODIUM CHLORIDE, SODIUM LACTATE, POTASSIUM CHLORIDE, CALCIUM CHLORIDE 600; 310; 30; 20 MG/100ML; MG/100ML; MG/100ML; MG/100ML
75 INJECTION, SOLUTION INTRAVENOUS CONTINUOUS
Status: DISCONTINUED | OUTPATIENT
Start: 2024-08-11 | End: 2024-08-11

## 2024-08-11 RX ORDER — POLYETHYLENE GLYCOL 3350 17 G/17G
17 POWDER, FOR SOLUTION ORAL DAILY PRN
Status: DISCONTINUED | OUTPATIENT
Start: 2024-08-11 | End: 2024-08-16 | Stop reason: HOSPADM

## 2024-08-11 RX ORDER — ONDANSETRON 4 MG/1
4 TABLET, ORALLY DISINTEGRATING ORAL EVERY 6 HOURS PRN
Status: DISCONTINUED | OUTPATIENT
Start: 2024-08-11 | End: 2024-08-16 | Stop reason: HOSPADM

## 2024-08-11 RX ORDER — SENNOSIDES A AND B 8.6 MG/1
1 TABLET, FILM COATED ORAL NIGHTLY
COMMUNITY

## 2024-08-11 RX ORDER — ACETAMINOPHEN 650 MG/1
650 SUPPOSITORY RECTAL EVERY 4 HOURS PRN
Status: DISCONTINUED | OUTPATIENT
Start: 2024-08-11 | End: 2024-08-16 | Stop reason: HOSPADM

## 2024-08-11 RX ORDER — ACETAMINOPHEN 160 MG/5ML
650 SOLUTION ORAL EVERY 4 HOURS PRN
Status: DISCONTINUED | OUTPATIENT
Start: 2024-08-11 | End: 2024-08-16 | Stop reason: HOSPADM

## 2024-08-11 RX ORDER — AMOXICILLIN 250 MG
2 CAPSULE ORAL 2 TIMES DAILY PRN
Status: DISCONTINUED | OUTPATIENT
Start: 2024-08-11 | End: 2024-08-16 | Stop reason: HOSPADM

## 2024-08-11 RX ADMIN — SODIUM CHLORIDE, POTASSIUM CHLORIDE, SODIUM LACTATE AND CALCIUM CHLORIDE 75 ML/HR: 600; 310; 30; 20 INJECTION, SOLUTION INTRAVENOUS at 19:27

## 2024-08-11 RX ADMIN — SODIUM CHLORIDE 500 ML: 9 INJECTION, SOLUTION INTRAVENOUS at 21:07

## 2024-08-11 RX ADMIN — SODIUM CHLORIDE 1632 ML: 9 INJECTION, SOLUTION INTRAVENOUS at 15:18

## 2024-08-11 RX ADMIN — SODIUM CHLORIDE 500 ML: 9 INJECTION, SOLUTION INTRAVENOUS at 14:51

## 2024-08-11 RX ADMIN — PIPERACILLIN AND TAZOBACTAM 3.38 G: 3; .375 INJECTION, POWDER, FOR SOLUTION INTRAVENOUS at 14:51

## 2024-08-11 RX ADMIN — ACETAMINOPHEN 1000 MG: 500 TABLET, FILM COATED ORAL at 13:02

## 2024-08-11 RX ADMIN — PIPERACILLIN AND TAZOBACTAM 3.38 G: 3; .375 INJECTION, POWDER, FOR SOLUTION INTRAVENOUS at 21:30

## 2024-08-11 RX ADMIN — SODIUM CHLORIDE 500 ML: 9 INJECTION, SOLUTION INTRAVENOUS at 20:10

## 2024-08-11 RX ADMIN — SODIUM CHLORIDE 1250 MG: 9 INJECTION, SOLUTION INTRAVENOUS at 15:22

## 2024-08-11 NOTE — ED PROVIDER NOTES
Subjective   History of Present Illness  Chief complaint: Patient is an 83-year-old who was short of breath and having chills.  She states she felt her heart racing.  She called EMS.  They did see oxygen saturations of 77%.  They placed her on nonrebreather and transported her and route.  She has been having some chest pain in the left chest.  She has been having some cough.  No urinary symptoms.  No abdominal pain.  No vomiting or diarrhea.    Context:    Duration:    Timing:    Severity:    Associated Symptoms:        PCP:  LMP:      Review of Systems   Constitutional:  Positive for chills and fever.   Respiratory:  Positive for cough and shortness of breath.    Cardiovascular:  Positive for palpitations.   Gastrointestinal: Negative.    Genitourinary: Negative.        Past Medical History:   Diagnosis Date    Anxiety     Cataracts, bilateral     Femur fracture, right     Hypothyroidism     Insomnia     Kyphoscoliosis     MAMMO     NEG = 2019    Osteoporosis     Paraplegic immobility syndrome     Peripheral neuropathy     Polio osteopathy of lower leg, left     Polio osteopathy of lower leg, right     Scoliosis        Allergies   Allergen Reactions    Latex Rash    Ceftazidime Unknown - High Severity and Other (See Comments)    Furosemide Hives, Unknown (See Comments) and Unknown - Low Severity    Pantoprazole Nausea And Vomiting and Unknown - Low Severity    Pantoprazole Sodium Nausea And Vomiting    Tizanidine Dizziness and Other (See Comments)       Past Surgical History:   Procedure Laterality Date    APPENDECTOMY      LIPOMA EXCISION Bilateral     breast    TOTAL ABDOMINAL HYSTERECTOMY         Family History   Problem Relation Age of Onset    Diabetes Mother     Heart disease Mother         CHF    Heart failure Father     Heart disease Father     COPD Father         BLACK LUNG    Lymphoma Sister     Lung cancer Brother     Pancreatic cancer Brother     Bone cancer Brother     Cancer Brother        Social  History     Socioeconomic History    Marital status: Single   Tobacco Use    Smoking status: Never     Passive exposure: Never    Smokeless tobacco: Never   Vaping Use    Vaping status: Never Used   Substance and Sexual Activity    Alcohol use: No    Drug use: No    Sexual activity: Defer           Objective   Physical Exam  Vitals and nursing note reviewed.   Constitutional:       General: She is in acute distress.      Appearance: She is ill-appearing.   HENT:      Head: Normocephalic.   Cardiovascular:      Rate and Rhythm: Regular rhythm. Tachycardia present.   Pulmonary:      Effort: Tachypnea present.      Breath sounds: Decreased breath sounds present.   Abdominal:      Palpations: Abdomen is soft.      Tenderness: There is no abdominal tenderness.   Musculoskeletal:      Right lower leg: No tenderness. No edema.      Left lower leg: No tenderness. No edema.   Skin:     General: Skin is warm and dry.   Neurological:      General: No focal deficit present.      Mental Status: She is alert and oriented to person, place, and time.         Procedures           ED Course  ED Course as of 08/11/24 1444   Sun Aug 11, 2024   1444 Delay lactic acid [LH]      ED Course User Index  [LH] Cristino Goddard DO      Results for orders placed or performed during the hospital encounter of 08/11/24   Respiratory Panel PCR w/COVID-19(SARS-CoV-2) [facility]/EL[facility]ABETH/MEGAN/[facility]/ROBIN In-House, NP Swab in UTM/VTM, 2 HR TAT - Swab, Nasopharynx    Specimen: Nasopharynx; Swab   Result Value Ref Range    ADENOVIRUS, PCR Not Detected Not Detected    Coronavirus 229E Not Detected Not Detected    Coronavirus HKU1 Not Detected Not Detected    Coronavirus NL63 Not Detected Not Detected    Coronavirus OC43 Not Detected Not Detected    COVID19 Not Detected Not Detected - Ref. Range    Human Metapneumovirus Not Detected Not Detected    Human Rhinovirus/Enterovirus Not Detected Not Detected    Influenza A PCR Not Detected Not Detected    Influenza B  PCR Not Detected Not Detected    Parainfluenza Virus 1 Not Detected Not Detected    Parainfluenza Virus 2 Not Detected Not Detected    Parainfluenza Virus 3 Not Detected Not Detected    Parainfluenza Virus 4 Not Detected Not Detected    RSV, PCR Not Detected Not Detected    Bordetella pertussis pcr Not Detected Not Detected    Bordetella parapertussis PCR Not Detected Not Detected    Chlamydophila pneumoniae PCR Not Detected Not Detected    Mycoplasma pneumo by PCR Not Detected Not Detected   Blood Gas, Arterial -    Specimen: Arterial Blood   Result Value Ref Range    Site Right Radial     Desean's Test Positive     pH, Arterial 7.427 7.350 - 7.450 pH units    pCO2, Arterial 33.6 (L) 35.0 - 48.0 mm Hg    pO2, Arterial 58.2 (L) 83.0 - 108.0 mm Hg    HCO3, Arterial 22.2 21.0 - 28.0 mmol/L    Base Excess, Arterial -1.5 (L) 0.0 - 3.0 mmol/L    O2 Saturation, Arterial 90.8 (L) 94.0 - 98.0 %    CO2 Content 23.2 22 - 29 mmol/L    Barometric Pressure for Blood Gas      Modality Cannula     FIO2 28 %    Hemodilution No     PO2/FIO2 208 0 - 500   BNP    Specimen: Blood   Result Value Ref Range    proBNP 243.0 0.0 - 1,800.0 pg/mL   High Sensitivity Troponin T    Specimen: Blood   Result Value Ref Range    HS Troponin T 16 (H) <14 ng/L   Comprehensive Metabolic Panel    Specimen: Blood   Result Value Ref Range    Glucose 116 (H) 65 - 99 mg/dL    BUN 13 8 - 23 mg/dL    Creatinine 0.36 (L) 0.57 - 1.00 mg/dL    Sodium 130 (L) 136 - 145 mmol/L    Potassium 4.5 3.5 - 5.2 mmol/L    Chloride 98 98 - 107 mmol/L    CO2 20.2 (L) 22.0 - 29.0 mmol/L    Calcium 8.7 8.6 - 10.5 mg/dL    Total Protein 6.3 6.0 - 8.5 g/dL    Albumin 3.6 3.5 - 5.2 g/dL    ALT (SGPT) 6 1 - 33 U/L    AST (SGOT) 19 1 - 32 U/L    Alkaline Phosphatase 80 39 - 117 U/L    Total Bilirubin 0.4 0.0 - 1.2 mg/dL    Globulin 2.7 gm/dL    A/G Ratio 1.3 g/dL    BUN/Creatinine Ratio 36.1 (H) 7.0 - 25.0    Anion Gap 11.8 5.0 - 15.0 mmol/L    eGFR 99.6 >60.0 mL/min/1.73   CBC Auto  Differential    Specimen: Blood   Result Value Ref Range    WBC 15.02 (H) 3.40 - 10.80 10*3/mm3    RBC 4.60 3.77 - 5.28 10*6/mm3    Hemoglobin 13.4 12.0 - 15.9 g/dL    Hematocrit 44.2 34.0 - 46.6 %    MCV 96.1 79.0 - 97.0 fL    MCH 29.1 26.6 - 33.0 pg    MCHC 30.3 (L) 31.5 - 35.7 g/dL    RDW 13.4 12.3 - 15.4 %    RDW-SD 47.8 37.0 - 54.0 fl    MPV 10.3 6.0 - 12.0 fL    Platelets 200 140 - 450 10*3/mm3    Neutrophil % 91.3 (H) 42.7 - 76.0 %    Lymphocyte % 5.0 (L) 19.6 - 45.3 %    Monocyte % 2.5 (L) 5.0 - 12.0 %    Eosinophil % 0.5 0.3 - 6.2 %    Basophil % 0.2 0.0 - 1.5 %    Immature Grans % 0.5 0.0 - 0.5 %    Neutrophils, Absolute 13.72 (H) 1.70 - 7.00 10*3/mm3    Lymphocytes, Absolute 0.75 0.70 - 3.10 10*3/mm3    Monocytes, Absolute 0.37 0.10 - 0.90 10*3/mm3    Eosinophils, Absolute 0.07 0.00 - 0.40 10*3/mm3    Basophils, Absolute 0.03 0.00 - 0.20 10*3/mm3    Immature Grans, Absolute 0.08 (H) 0.00 - 0.05 10*3/mm3    nRBC 0.0 0.0 - 0.2 /100 WBC   ECG 12 Lead Other; Shortness of breath   Result Value Ref Range    QT Interval 304 ms    QTC Interval 414 ms                                  XR Chest 1 View    Result Date: 8/11/2024  Impression: Findings concerning for pneumonia/pneumonitis involving the left mid to lower lung. 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  She has elevated white blood cell count and fever.  Likely the cause of her symptoms.  She is allergic to ceftazidime.  She was started on Zosyn.  She is in a facility.  She will be admitted to the hospital.  Her oxygen was able to be weaned down to a couple of liters nasal cannula.  Per her MAR from the facility she is on  preventative DVT prophylaxis Lovenox dosing.  Less likely to be PE.  This fits an infectious picture.  I spoke with Ann on-call for  who agrees to admit the patient.  Discussed findings and potential need for CT but at this time it was considered and not ordered.    Amount and/or Complexity of Data Reviewed  Labs: ordered. Decision-making details documented in ED Course.     Details: Labs reviewed by myself  Radiology: ordered and independent interpretation performed.     Details: X-ray reviewed by myself as above  ECG/medicine tests: ordered and independent interpretation performed.     Details: EKG interpreted by myself poor baseline narrow complex tachycardia rate of 111.  Likely sinus.    Risk  OTC drugs.  Prescription drug management.  Drug therapy requiring intensive monitoring for toxicity.  Decision regarding hospitalization.        Final diagnoses:   None     Fever   Pneumonia    ED Disposition  ED Disposition       None            No follow-up provider specified.       Medication List      No changes were made to your prescriptions during this visit.            Cristino Goddard,   08/11/24 1775

## 2024-08-11 NOTE — H&P
"[facility] Medicine Services  History & Physical    Patient Name: Karina Saleem  : 1940  MRN: [id number]  Primary Care Physician:  Edgar Barriga MD  Date of admission: 2024  Date and Time of Service: 2024 at 1445    Subjective      Chief Complaint: chest pain, shortness of breath     History of Present Illness: Karina Saleem is a 83 y.o. female with a CMH of h/o polio, hypothyroidism and anxiety who presented to [facility] on 2024  from [facility] due to patient reports of heart rate \"going up and down\". Patient otherwise denies chest pain, shortness of breath, cough, fever and chills. Denies needing oxygen at baseline. Per EMS, patient was noted to be hypoxic with sats in the 70s and was placed on 15L NRB. Patient also noted to be febrile with temp of 102.4. ABG with pH 7.42, PO2 58, pCO2 33.6. Labs were pertinent for WBC 15 with LA of 2.1. Troponin 16 with BNP of 243. CXR revealed findings concerning for pneumonia/pneumonitis involving the left mid-lower lung. Patient was started on Vancomycin and Zosyn. Hospitalist service to admit for further management.      Review of Systems   Constitutional:  Positive for fever. Negative for chills.   Respiratory:  Positive for shortness of breath.    Cardiovascular:  Positive for chest pain. Negative for palpitations.   Gastrointestinal:  Negative for abdominal pain, nausea and vomiting.   All other systems reviewed and are negative.      Personal History     Past Medical History:   Diagnosis Date    Anxiety     Cataracts, bilateral     Femur fracture, right     Hypothyroidism     Insomnia     Kyphoscoliosis     MAMMO     NEG =     Osteoporosis     Paraplegic immobility syndrome     Peripheral neuropathy     Polio osteopathy of lower leg, left     Polio osteopathy of lower leg, right     Scoliosis        Past Surgical History:   Procedure Laterality Date    APPENDECTOMY      LIPOMA EXCISION Bilateral     breast    " TOTAL ABDOMINAL HYSTERECTOMY         Family History: family history includes Bone cancer in her brother; COPD in her father; Cancer in her brother; Diabetes in her mother; Heart disease in her father and mother; Heart failure in her father; Lung cancer in her brother; Lymphoma in her sister; Pancreatic cancer in her brother. Otherwise pertinent FHx was reviewed and not pertinent to current issue.    Social History:  reports that she has never smoked. She has never been exposed to tobacco smoke. She has never used smokeless tobacco. She reports that she does not drink alcohol and does not use drugs.    Home Medications:  Prior to Admission Medications       Prescriptions Last Dose Informant Patient Reported? Taking?    acetaminophen (TYLENOL) 325 MG tablet   Yes No    Take 2 tablets by mouth Every 8 (Eight) Hours As Needed for Mild Pain.    atorvastatin (LIPITOR) 10 MG tablet   Yes No    Take 1 tablet by mouth Daily.    bismuth subsalicylate (PEPTO BISMOL) 262 MG chewable tablet   No No    Chew 1 tablet Daily As Needed for Indigestion. 15ML every 8 hours as needed for stomach discomfort    Levi Carb-Mag Hydrox-Simeth (Mylanta Coat & Cool) 1200-270-80 MG/10ML suspension   No No    Take 10 mL by mouth Daily As Needed (GERD). Give 20ML by mouth every 4 hours as needed for indigestion    Cholecalciferol 50 MCG (2000 UT) tablet   Yes No    Take 1 tablet by mouth Daily.    diazePAM (VALIUM) 5 MG tablet   No No    1 po q dinner prn    docusate sodium 100 MG capsule   Yes No    Take 1 capsule by mouth As Needed.    Ergocalciferol (Vitamin D2) 50 MCG (2000 UT) tablet   No No    Take 1 tablet by mouth Daily.    esomeprazole (nexIUM) 40 MG capsule   No No    Take 1 capsule by mouth Every Morning Before Breakfast.    fentaNYL (DURAGESIC) 100 MCG/HR patch   No No    Place 1 patch on the skin as directed by provider Every 72 (Seventy-Two) Hours.    gabapentin (NEURONTIN) 100 MG capsule   No No    Take 2 capsules by mouth 2 (Two)  Times a Day.    HYDROcodone-acetaminophen (NORCO)  MG per tablet   No No    Take 1 tablet by mouth Daily As Needed for Severe Pain.    lactulose (CHRONULAC) 10 GM/15ML solution   No No    Take 30 mL by mouth 2 (Two) Times a Day As Needed (constipation).    levothyroxine (SYNTHROID, LEVOTHROID) 137 MCG tablet   No No    Take 1 tablet by mouth Daily.    methocarbamol (ROBAXIN) 500 MG tablet   Yes No    Take 1 tablet by mouth As Needed.    naloxone (NARCAN) 4 MG/0.1ML nasal spray   Yes No    1 spray into the nostril(s) as directed by provider As Needed.    nitroglycerin (NITROSTAT) 0.4 MG SL tablet   No No    DISSOLVE 1 TABLET UNDER THE TONGUE EVERY 5 MINUTES AS NEEDED FOR CHEST PAIN. DO NOT EXCEED A TOTAL OF 3 DOSES IN 15 MINUTES.    terbinafine (lamiSIL) 250 MG tablet   No No    Take 1 tablet by mouth Daily.    Vitamins A & D (magic barrier cream)   No No    Apply 1 Application topically to the appropriate area as directed 2 (Two) Times a Day As Needed (butt irritation).    zolpidem (AMBIEN) 10 MG tablet   No No    Take 1 tablet by mouth every night at bedtime.              Allergies:  Allergies   Allergen Reactions    Latex Rash    Ceftazidime Unknown - High Severity and Other (See Comments)    Furosemide Hives, Unknown (See Comments) and Unknown - Low Severity    Pantoprazole Nausea And Vomiting and Unknown - Low Severity    Pantoprazole Sodium Nausea And Vomiting    Tizanidine Dizziness and Other (See Comments)       Objective      Vitals:   Temp:  [99.3 °F (37.4 °C)-102.4 °F (39.1 °C)] 102.4 °F (39.1 °C)  Heart Rate:  [] 111  Resp:  [26] 26  BP: (134)/(76) 134/76  Body mass index is 21.26 kg/m².    Physical Exam  General: 82 yo WF, Alert and oriented x3, well nourished, no acute distress.  HENT: Normocephalic, normal hearing, moist oral mucosa, no scleral icterus.  Neck: Supple, non-tender, no carotid bruits, no JVD, no LAD.  Lungs: Clear to auscultation, non-labored respiration.  Heart: RRR, no  murmur, gallop or edema.  Abdomen: Soft, nontender, non-distended, + bowel sounds.  Musculoskeletal: BLE flaccid 2/2 polio with palpable DP/PT pulses. Brace on RLE in place.   Skin: Skin is warm, dry and pink, no rashes or lesions.  Psychiatric: Cooperative, appropriate mood and affect.      Diagnostic Data:  Lab Results (last 24 hours)       Procedure Component Value Units Date/Time    POC Lactate [[id number]]  (Abnormal) Collected: 08/11/24 1443    Specimen: Blood Updated: 08/11/24 1445     Lactate 2.1 mmol/L      Comment: Serial Number: [id number]:  [id number]       Respiratory Panel PCR w/COVID-19(SARS-CoV-2) LUIS/ELIZABETH/MEGAN/PAD/COR/ROBIN In-House, NP Swab in UTM/VTM, 2 HR TAT - Swab, Nasopharynx [[id number]]  (Normal) Collected: 08/11/24 1301    Specimen: Swab from Nasopharynx Updated: 08/11/24 1405     ADENOVIRUS, PCR Not Detected     Coronavirus 229E Not Detected     Coronavirus HKU1 Not Detected     Coronavirus NL63 Not Detected     Coronavirus OC43 Not Detected     COVID19 Not Detected     Human Metapneumovirus Not Detected     Human Rhinovirus/Enterovirus Not Detected     Influenza A PCR Not Detected     Influenza B PCR Not Detected     Parainfluenza Virus 1 Not Detected     Parainfluenza Virus 2 Not Detected     Parainfluenza Virus 3 Not Detected     Parainfluenza Virus 4 Not Detected     RSV, PCR Not Detected     Bordetella pertussis pcr Not Detected     Bordetella parapertussis PCR Not Detected     Chlamydophila pneumoniae PCR Not Detected     Mycoplasma pneumo by PCR Not Detected    Narrative:      In the setting of a positive respiratory panel with a viral infection PLUS a negative procalcitonin without other underlying concern for bacterial infection, consider observing off antibiotics or discontinuation of antibiotics and continue supportive care. If the respiratory panel is positive for atypical bacterial infection (Bordetella pertussis, Chlamydophila pneumoniae, or Mycoplasma pneumoniae), consider  antibiotic de-escalation to target atypical bacterial infection.    Comprehensive Metabolic Panel [[id number]]  (Abnormal) Collected: 08/11/24 1257    Specimen: Blood Updated: 08/11/24 1324     Glucose 116 mg/dL      BUN 13 mg/dL      Creatinine 0.36 mg/dL      Sodium 130 mmol/L      Potassium 4.5 mmol/L      Comment: Slight hemolysis detected by analyzer. Result may be falsely elevated.        Chloride 98 mmol/L      CO2 20.2 mmol/L      Calcium 8.7 mg/dL      Total Protein 6.3 g/dL      Albumin 3.6 g/dL      ALT (SGPT) 6 U/L      AST (SGOT) 19 U/L      Comment: Slight hemolysis detected by analyzer. Result may be falsely elevated.        Alkaline Phosphatase 80 U/L      Total Bilirubin 0.4 mg/dL      Globulin 2.7 gm/dL      A/G Ratio 1.3 g/dL      BUN/Creatinine Ratio 36.1     Anion Gap 11.8 mmol/L      eGFR 99.6 mL/min/1.73     Narrative:      GFR Normal >60  Chronic Kidney Disease <60  Kidney Failure <15    The GFR formula is only valid for adults with stable renal function between ages 18 and 70.    High Sensitivity Troponin T [[id number]]  (Abnormal) Collected: 08/11/24 1257    Specimen: Blood Updated: 08/11/24 1323     HS Troponin T 16 ng/L     Narrative:      High Sensitive Troponin T Reference Range:  <14.0 ng/L- Negative Female for AMI  <22.0 ng/L- Negative Male for AMI  >=14 - Abnormal Female indicating possible myocardial injury.  >=22 - Abnormal Male indicating possible myocardial injury.   Clinicians would have to utilize clinical acumen, EKG, Troponin, and serial changes to determine if it is an Acute Myocardial Infarction or myocardial injury due to an underlying chronic condition.         BNP [[id number]]  (Normal) Collected: 08/11/24 1257    Specimen: Blood Updated: 08/11/24 1323     proBNP 243.0 pg/mL     Narrative:      This assay is used as an aid in the diagnosis of individuals suspected of having heart failure. It can be used as an aid in the diagnosis of acute decompensated heart failure (ADHF)  in patients presenting with signs and symptoms of ADHF to the emergency department (ED). In addition, NT-proBNP of <300 pg/mL indicates ADHF is not likely.    Age Range Result Interpretation  NT-proBNP Concentration (pg/mL:      <50             Positive            >450                   Gray                 300-450                    Negative             <300    50-75           Positive            >900                  Gray                300-900                  Negative            <300      >75             Positive            >1800                  Gray                300-1800                  Negative            <300    Blood Gas, Arterial - [[id number]]  (Abnormal) Collected: 08/11/24 1312    Specimen: Arterial Blood Updated: 08/11/24 1316     Site Right Radial     Desean's Test Positive     pH, Arterial 7.427 pH units      pCO2, Arterial 33.6 mm Hg      pO2, Arterial 58.2 mm Hg      HCO3, Arterial 22.2 mmol/L      Base Excess, Arterial -1.5 mmol/L      Comment: Serial Number: [id number]:  [id number]        O2 Saturation, Arterial 90.8 %      CO2 Content 23.2 mmol/L      Barometric Pressure for Blood Gas --     Comment: N/A        Modality Cannula     FIO2 28 %      Hemodilution No     PO2/FIO2 208    CBC & Differential [[id number]]  (Abnormal) Collected: 08/11/24 1257    Specimen: Blood Updated: 08/11/24 1302    Narrative:      The following orders were created for panel order CBC & Differential.  Procedure                               Abnormality         Status                     ---------                               -----------         ------                     CBC Auto Differential[[id number]]        Abnormal            Final result                 Please view results for these tests on the individual orders.    CBC Auto Differential [[id number]]  (Abnormal) Collected: 08/11/24 1257    Specimen: Blood Updated: 08/11/24 1302     WBC 15.02 10*3/mm3      RBC 4.60 10*6/mm3      Hemoglobin 13.4 g/dL       Hematocrit 44.2 %      MCV 96.1 fL      MCH 29.1 pg      MCHC 30.3 g/dL      RDW 13.4 %      RDW-SD 47.8 fl      MPV 10.3 fL      Platelets 200 10*3/mm3      Neutrophil % 91.3 %      Lymphocyte % 5.0 %      Monocyte % 2.5 %      Eosinophil % 0.5 %      Basophil % 0.2 %      Immature Grans % 0.5 %      Neutrophils, Absolute 13.72 10*3/mm3      Lymphocytes, Absolute 0.75 10*3/mm3      Monocytes, Absolute 0.37 10*3/mm3      Eosinophils, Absolute 0.07 10*3/mm3      Basophils, Absolute 0.03 10*3/mm3      Immature Grans, Absolute 0.08 10*3/mm3      nRBC 0.0 /100 WBC     Blood Culture - Blood, Arm, Left [[id number]] Collected: 08/11/24 1257    Specimen: Blood from Arm, Left Updated: 08/11/24 1300             Imaging Results (Last 24 Hours)       Procedure Component Value Units Date/Time    XR Chest 1 View [[id number]] Collected: 08/11/24 1313     Updated: 08/11/24 1317    Narrative:      XR CHEST 1 VW    Date of Exam: 8/11/2024 12:59 PM EDT    Indication: soa    Comparison: 6/15/2024    Findings:  Lines: None    Lungs: Chronic elevation of the right hemidiaphragm. Patchy opacities noted in the left mid to lower lung, predominantly in the perihilar region. The upper lungs are relatively clear.  Pleura: No pleural effusion or pneumothorax.    Cardiomediastinum: Unchanged    Soft Tissues: Unremarkable.    Bones: No acute osseous abnormality. Moderate to severe S-shaped scoliosis of the thoracolumbar spine.      Impression:      Impression:  Findings concerning for pneumonia/pneumonitis involving the left mid to lower lung.    Additional chronic findings as characterized above      Electronically Signed: Nestor Rosen DO    8/11/2024 1:15 PM EDT    Workstation ID: [id number]              Assessment & Plan        This is a 83 y.o. female with:    Active and Resolved Problems  Active Hospital Problems    Diagnosis  POA    **Pneumonia [J18.9]  Yes      Resolved Hospital Problems   No resolved problems to display.        Pneumonia  Acute respiratory failure with hypoxia  CXR- Findings concerning for pneumonia/pneumonitis involving the left mid-lower lung  ABG- pH 7.42, PO2 58, pCO2  Resp panel negative  On 2L NC, previously 15L NRB, b/l RA  Continue Vancomycin and Zosyn   If oxygen needs increase, may need pulm consult     Sepsis  SIRS criteria: WBC 15, HR > 90, febrile  Likely due to PNA  LA 2.1, repeat after IVFs pending   Sepsis IVFs pending   Continue above abx  Blood cultures pending    Hyperlipidemia  Continue statin    Hypothyroidism  Continue Levothyroxine    Anxiety  Resume home medications once reconciled    H/o polio  Bed/wheelchair bound  On Lovenox for DVT ppx    GERD   Continue PPI        VTE Prophylaxis:  No VTE prophylaxis order currently exists.        The patient desires to be as follows:    CODE STATUS:    Code Status (Patient has no pulse and is not breathing): No CPR (Do Not Attempt to Resuscitate)  Medical Interventions (Patient has pulse or is breathing): Full Support        Admission Status:  I believe this patient meets inpatient status.    Expected Length of Stay: > 24 hours     PDMP and Medication Dispenses via Sidebar reviewed and consistent with patient reported medications.    I discussed the patient's findings and my recommendations with patient.      Signature:     This document has been electronically signed by Rosaura Reeves PA-C on August 11, 2024 14:46 EDT   [facility] Hospitalist Team

## 2024-08-11 NOTE — Clinical Note
Level of Care: Progressive Care [20]   Diagnosis: Pneumonia [895964]   Admitting Physician: AZEB NIEVES [[id number]]   Attending Physician: AZEB NIEVES [[id number]]   Certification: I Certify That Inpatient Hospital Services Are Medically Necessary For Greater Than 2 Midnights

## 2024-08-11 NOTE — ED NOTES
Pt. States that earlier this am she began having some pain in her left chest that extended into left arm but is gone now. Denies pain. EMS reports shortness of breath x 3 hours per [facility] facility. Pt. States she just needs to go home. Oriented x 4. Seems upset. Had some nausea earlier. Shaking chills at this time. States she doesn't know how long she has been at [facility] but EMS reports she is there for rehab from a recent tib/fib repair. Left leg has full leg soft splint/immobilizer. Pt. Is pale, tachycardiac and febrile. Skin is not hot. Pt. Has DNR orders from [facility] per EMS.

## 2024-08-11 NOTE — LETTER
EMS Transport Request  For use at [facility], [place], [place], [place], and [place] only   Patient Name: Karina Saleem : 1940   Weight:63.5 kg (140 lb) Pick-up Location: [facility] BLS/ALS: BLS/ALS: BLS   Insurance: MEDICARE Auth End Date: 24   Pre-Cert #: D/C Summary complete:    Destination: Other [facility]   Contact Precautions: None   Equipment (O2, Fluids, etc.): O2, settings 2L NC   Arrive By Date/Time: 24 Stretcher/WC: Stretcher   CM Requesting: Basia Baeza RN Ext: [phone]   Notes/Medical Necessity: AMBULANCE - STRETCHER - WILL CALL - patient has dc orders  - 2L NC - bedbound and unable to sit up in a WC for long periods     ______________________________________________________________________    *Only 2 patient bags OR 1 carry-on size bag are permitted.  Wheelchairs and walkers CANNOT transported with the patient. Acknowledge: Yes

## 2024-08-12 ENCOUNTER — APPOINTMENT (OUTPATIENT)
Dept: GENERAL RADIOLOGY | Facility: HOSPITAL | Age: 84
DRG: 871 | End: 2024-08-12
Payer: MEDICARE

## 2024-08-12 LAB
ANION GAP SERPL CALCULATED.3IONS-SCNC: 6.6 MMOL/L (ref 5–15)
BASOPHILS # BLD AUTO: 0.05 10*3/MM3 (ref 0–0.2)
BASOPHILS NFR BLD AUTO: 0.2 % (ref 0–1.5)
BUN SERPL-MCNC: 10 MG/DL (ref 8–23)
BUN/CREAT SERPL: 31.3 (ref 7–25)
CALCIUM SPEC-SCNC: 8.2 MG/DL (ref 8.6–10.5)
CHLORIDE SERPL-SCNC: 105 MMOL/L (ref 98–107)
CO2 SERPL-SCNC: 24.4 MMOL/L (ref 22–29)
CORTIS SERPL-MCNC: 6.87 MCG/DL
CREAT SERPL-MCNC: 0.32 MG/DL (ref 0.57–1)
D-LACTATE SERPL-SCNC: 1.2 MMOL/L (ref 0.5–2)
DEPRECATED RDW RBC AUTO: 46.4 FL (ref 37–54)
EGFRCR SERPLBLD CKD-EPI 2021: 101.9 ML/MIN/1.73
EOSINOPHIL # BLD AUTO: 0.13 10*3/MM3 (ref 0–0.4)
EOSINOPHIL NFR BLD AUTO: 0.5 % (ref 0.3–6.2)
ERYTHROCYTE [DISTWIDTH] IN BLOOD BY AUTOMATED COUNT: 13.6 % (ref 12.3–15.4)
GLUCOSE SERPL-MCNC: 105 MG/DL (ref 65–99)
HCT VFR BLD AUTO: 35.2 % (ref 34–46.6)
HGB BLD-MCNC: 11.3 G/DL (ref 12–15.9)
IMM GRANULOCYTES # BLD AUTO: 0.14 10*3/MM3 (ref 0–0.05)
IMM GRANULOCYTES NFR BLD AUTO: 0.6 % (ref 0–0.5)
LYMPHOCYTES # BLD AUTO: 1.73 10*3/MM3 (ref 0.7–3.1)
LYMPHOCYTES NFR BLD AUTO: 7.2 % (ref 19.6–45.3)
MAGNESIUM SERPL-MCNC: 1.8 MG/DL (ref 1.6–2.4)
MCH RBC QN AUTO: 29.7 PG (ref 26.6–33)
MCHC RBC AUTO-ENTMCNC: 32.1 G/DL (ref 31.5–35.7)
MCV RBC AUTO: 92.4 FL (ref 79–97)
MONOCYTES # BLD AUTO: 0.89 10*3/MM3 (ref 0.1–0.9)
MONOCYTES NFR BLD AUTO: 3.7 % (ref 5–12)
NEUTROPHILS NFR BLD AUTO: 21.16 10*3/MM3 (ref 1.7–7)
NEUTROPHILS NFR BLD AUTO: 87.8 % (ref 42.7–76)
NRBC BLD AUTO-RTO: 0 /100 WBC (ref 0–0.2)
PLATELET # BLD AUTO: 217 10*3/MM3 (ref 140–450)
PMV BLD AUTO: 11.4 FL (ref 6–12)
POTASSIUM SERPL-SCNC: 3.8 MMOL/L (ref 3.5–5.2)
QT INTERVAL: 304 MS
QTC INTERVAL: 414 MS
RBC # BLD AUTO: 3.81 10*6/MM3 (ref 3.77–5.28)
SODIUM SERPL-SCNC: 136 MMOL/L (ref 136–145)
WBC NRBC COR # BLD AUTO: 24.1 10*3/MM3 (ref 3.4–10.8)

## 2024-08-12 PROCEDURE — 71045 X-RAY EXAM CHEST 1 VIEW: CPT

## 2024-08-12 PROCEDURE — 97162 PT EVAL MOD COMPLEX 30 MIN: CPT

## 2024-08-12 PROCEDURE — 85025 COMPLETE CBC W/AUTO DIFF WBC: CPT

## 2024-08-12 PROCEDURE — 80048 BASIC METABOLIC PNL TOTAL CA: CPT

## 2024-08-12 PROCEDURE — 82533 TOTAL CORTISOL: CPT | Performed by: INTERNAL MEDICINE

## 2024-08-12 PROCEDURE — 25010000002 ENOXAPARIN PER 10 MG: Performed by: INTERNAL MEDICINE

## 2024-08-12 PROCEDURE — 02HV33Z INSERTION OF INFUSION DEVICE INTO SUPERIOR VENA CAVA, PERCUTANEOUS APPROACH: ICD-10-PCS | Performed by: HOSPITALIST

## 2024-08-12 PROCEDURE — C1751 CATH, INF, PER/CENT/MIDLINE: HCPCS

## 2024-08-12 PROCEDURE — 97165 OT EVAL LOW COMPLEX 30 MIN: CPT

## 2024-08-12 PROCEDURE — 25810000003 SODIUM CHLORIDE 0.9 % SOLUTION: Performed by: NURSE PRACTITIONER

## 2024-08-12 PROCEDURE — 25010000002 PIPERACILLIN SOD-TAZOBACTAM PER 1 G

## 2024-08-12 PROCEDURE — 83735 ASSAY OF MAGNESIUM: CPT

## 2024-08-12 RX ORDER — FENTANYL 100 UG/1
1 PATCH TRANSDERMAL
Status: DISCONTINUED | OUTPATIENT
Start: 2024-08-12 | End: 2024-08-16 | Stop reason: HOSPADM

## 2024-08-12 RX ORDER — ENOXAPARIN SODIUM 100 MG/ML
40 INJECTION SUBCUTANEOUS EVERY 24 HOURS
Status: DISCONTINUED | OUTPATIENT
Start: 2024-08-12 | End: 2024-08-16 | Stop reason: HOSPADM

## 2024-08-12 RX ORDER — NOREPINEPHRINE BITARTRATE 0.03 MG/ML
.02-.5 INJECTION, SOLUTION INTRAVENOUS
Status: DISCONTINUED | OUTPATIENT
Start: 2024-08-12 | End: 2024-08-13

## 2024-08-12 RX ORDER — MIDODRINE HYDROCHLORIDE 5 MG/1
10 TABLET ORAL EVERY 8 HOURS
Status: DISCONTINUED | OUTPATIENT
Start: 2024-08-12 | End: 2024-08-14

## 2024-08-12 RX ADMIN — ENOXAPARIN SODIUM 40 MG: 100 INJECTION SUBCUTANEOUS at 16:44

## 2024-08-12 RX ADMIN — FENTANYL 1 PATCH: 100 PATCH TRANSDERMAL at 12:18

## 2024-08-12 RX ADMIN — PIPERACILLIN AND TAZOBACTAM 3.38 G: 3; .375 INJECTION, POWDER, FOR SOLUTION INTRAVENOUS at 17:43

## 2024-08-12 RX ADMIN — MIDODRINE HYDROCHLORIDE 10 MG: 5 TABLET ORAL at 16:44

## 2024-08-12 RX ADMIN — SODIUM CHLORIDE 500 ML: 9 INJECTION, SOLUTION INTRAVENOUS at 03:09

## 2024-08-12 RX ADMIN — NOREPINEPHRINE BITARTRATE 0.02 MCG/KG/MIN: 0.03 INJECTION, SOLUTION INTRAVENOUS at 00:39

## 2024-08-12 RX ADMIN — PIPERACILLIN AND TAZOBACTAM 3.38 G: 3; .375 INJECTION, POWDER, FOR SOLUTION INTRAVENOUS at 10:13

## 2024-08-12 RX ADMIN — MIDODRINE HYDROCHLORIDE 10 MG: 5 TABLET ORAL at 10:13

## 2024-08-12 NOTE — DISCHARGE PLACEMENT REQUEST
"TioKarina andrade (84 y.o. Female)       Date of Birth   1940    Social Security Number       Address   [address] [place] [address]    Home Phone   [phone]    MRN   [id number]       Alevism   Patient Refused    Marital Status   Single                            Admission Date   8/11/24    Admission Type   Emergency    Admitting Provider   Audie Orr MD    Attending Provider   Audie Orr MD    Department, Room/Bed   [facility] INTENSIVE CARE UNIT, 2310/1       Discharge Date       Discharge Disposition       Discharge Destination                                 Attending Provider: Audie Orr MD    Allergies: Latex, Ceftazidime, Furosemide, Pantoprazole, Pantoprazole Sodium, Tizanidine    Isolation: None   Infection: None   Code Status: No CPR    Ht: 160 cm (63\")   Wt: 61.1 kg (134 lb 11.2 oz)    Admission Cmt: None   Principal Problem: Pneumonia [J18.9]                   Active Insurance as of 8/11/2024       Primary Coverage       Payor Plan Insurance Group Employer/Plan Group    MEDICARE MEDICARE A & B        Payor Plan Address Payor Plan Phone Number Payor Plan Fax Number Effective Dates    [address] [phone]  2/1/2004 - None Entered    [place] [facility] [id number]         Subscriber Name Subscriber Birth Date Member ID       KARINA GONZALEZ 1940 [id number]               Secondary Coverage       Payor Plan Insurance Group Employer/Plan Group    [facility] COMMUNITY PLAN OF [facility] COMMUNITY PLAN OF [facility]        Payor Plan Address Payor Plan Phone Number Payor Plan Fax Number Effective Dates    [address]   7/1/2024 - None Entered    [facility] [id number]         Subscriber Name Subscriber Birth Date Member ID       KARINA GONZALEZ 1940 [id number]                     Emergency Contacts        (Rel.) Home Phone Work Phone Mobile Phone    Cassidy Ortega (Niece) (Other) " -- -- [phone]    MARTA COLMENARES -- -- [phone]

## 2024-08-12 NOTE — NURSING NOTE
Pt hypotensive throughout shift. Frequent communication from RN to Dr. Carlos Llanes Alvarez. Two 500mL boluses given, each ineffective. Intensivist consulted; Cristal BURTON at bedside with Dr. Saxena. Levophed gtt started for hypotension. Otherwise, vital signs stable.   Report called to ICU Saniya JUNIOR.

## 2024-08-12 NOTE — NURSING NOTE
Pt arrived to unit at approx 0145. Pt remains on 0.03 of levophed. Pt has been educated on importance of call light use, turning in bed, the use of the bladder scanner and the insertion of a PICC line. VSS

## 2024-08-12 NOTE — THERAPY EVALUATION
Patient Name: Karina Saleem  : 1940    MRN: [id number]                              Today's Date: 2024       Admit Date: 2024    Visit Dx:     ICD-10-CM ICD-9-CM   1. Pneumonia due to infectious organism, unspecified laterality, unspecified part of lung  J18.9 486     Patient Active Problem List   Diagnosis    Abnormal urinalysis    Arthralgia of right foot    Broken skin    Skin ulcer    Disuse osteoporosis    GERD (gastroesophageal reflux disease)    Hypothyroidism    Insomnia    Kyphosis    Kyphoscoliosis    Malaise and fatigue    Neuropathy, peripheral    Other dorsalgia    Pain of lower extremity    Poliomyelitis    Post poliomyelitis syndrome    Pressure ulcer, lower back    Right calf pain    Visit for screening mammogram    Vitamin D deficiency    Functional quadriplegia    Anxiety disorder, unspecified    Contracture of muscle, left hand    Contracture of muscle, right hand    Generalized muscle weakness    Immobility syndrome (paraplegic)    Iron deficiency anemia    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, multiple sites    Repeated falls    Unspecified open wound of unspecified front wall of thorax without penetration into thoracic cavity, subsequent encounter    Low back pain    Right upper quadrant abdominal pain    Pneumonia     Past Medical History:   Diagnosis Date    Anxiety     Cataracts, bilateral     Femur fracture, right     Hypothyroidism     Insomnia     Kyphoscoliosis     MAMMO     NEG = 2019    Osteoporosis     Paraplegic immobility syndrome     Peripheral neuropathy     Polio osteopathy of lower leg, left     Polio osteopathy of lower leg, right     Scoliosis      Past Surgical History:   Procedure Laterality Date    APPENDECTOMY      LIPOMA EXCISION Bilateral     breast    TOTAL ABDOMINAL HYSTERECTOMY        General Information       Row Name 24 1517          OT Time and Intention    Document Type evaluation  -SR     Mode of Treatment occupational therapy  -SR       Row  Name 08/12/24 1517          Occupational Profile    Reason for Services/Referral (Occupational Profile) Karina Saleem is a 83 y.o. female with a CMH of h/o polio, hypothyroidism and anxiety who presented to [facility] on 8/11/2024  from [facility] due to patient reports irregular heart rate. Dx with pna.  Per EMS, patient was noted to be hypoxic with sats in the 70s and was placed on 15L NRB.  Pt now on RA.  -SR       Row Name 08/12/24 1517          Living Environment    People in Home alone  -SR       Row Name 08/12/24 1517          Cognition    Orientation Status (Cognition) oriented to;person;place  -SR               User Key  (r) = Recorded By, (t) = Taken By, (c) = Cosigned By      Initials Name Provider Type    SR Kady George OT Occupational Therapist                     Mobility/ADL's       Row Name 08/12/24 1520          Bed Mobility    Bed Mobility bed mobility (all) activities  -SR     All Activities, [place] (Bed Mobility) dependent (less than 25% patient effort)  -SR       Row Name 08/12/24 1520          Activities of Daily Living    BADL Assessment/Intervention lower body dressing  -SR       Row Name 08/12/24 1520          Lower Body Dressing Assessment/Training    [place] Level (Lower Body Dressing) don;socks;dependent (less than 25% patient effort)  -SR               User Key  (r) = Recorded By, (t) = Taken By, (c) = Cosigned By      Initials Name Provider Type    SR Kady George, OT Occupational Therapist                   Obj/Interventions       Row Name 08/12/24 1529          Range of Motion Comprehensive    Comment, General Range of Motion BUE functionally WFL  -SR       Row Name 08/12/24 1529          Strength Comprehensive (MMT)    Comment, General Manual Muscle Testing (MMT) Assessment BUE grossly 3+/5  -SR               User Key  (r) = Recorded By, (t) = Taken By, (c) = Cosigned By      Initials Name Provider Type    SR Kady George, OT  Occupational Therapist                   Goals/Plan    No documentation.                  Clinical Impression       Row Name 08/12/24 1542          Pain Assessment    Pretreatment Pain Rating 0/10 - no pain  -SR     Posttreatment Pain Rating 0/10 - no pain  -SR       Row Name 08/12/24 1542          Plan of Care Review    Plan of Care Reviewed With patient  -SR     Outcome Evaluation Karina Saleem is a 83 y.o. female with a CMH of h/o polio, hypothyroidism and anxiety who presented to [facility] on 8/11/2024 from [facility] due to patient reports irregular heart rate. Dx with pna. Per EMS, patient was noted to be hypoxic with sats in the 70s and was placed on 15L NRB. Pt now on RA.  Pt reports she has been using brian lift in facility.  She is hesitant to participate in evaluation interview.  She has R knee immobilizer from injury in July. Pt reports she does not typically bear any weight on LE.  She has been able to eat and drink in bed, though requires assist for all lower body ADLs.  Recommend return to SNF at discharge.  -SR       Row Name 08/12/24 1542          Therapy Assessment/Plan (OT)    Criteria for Skilled Therapeutic Interventions Met (OT) does not meet criteria for skilled intervention  -SR     Therapy Frequency (OT) evaluation only  -SR       Row Name 08/12/24 1542          Therapy Plan Review/Discharge Plan (OT)    Anticipated Discharge Disposition (OT) skilled nursing facility  -SR       Row Name 08/12/24 1542          Positioning and Restraints    Pre-Treatment Position in bed  -SR     Post Treatment Position bed  -SR     In Bed call light within reach;encouraged to call for assist;exit alarm on  -SR               User Key  (r) = Recorded By, (t) = Taken By, (c) = Cosigned By      Initials Name Provider Type    SR Kady George, OT Occupational Therapist                   Outcome Measures       Row Name 08/12/24 1540 08/12/24 0814       How much help from another person do  you currently need...    Turning from your back to your side while in flat bed without using bedrails? 1  -RR 1  -RB    Moving from lying on back to sitting on the side of a flat bed without bedrails? 1  -RR 1  -RB    Moving to and from a bed to a chair (including a wheelchair)? 1  -RR 1  -RB    Standing up from a chair using your arms (e.g., wheelchair, bedside chair)? 1  -RR 1  -RB    Climbing 3-5 steps with a railing? 1  -RR 1  -RB    To walk in hospital room? 1  -RR 1  -RB    AM-PAC 6 Clicks Score (PT) 6  -RR 6  -RB    Highest Level of Mobility Goal 2 --> Bed activities/dependent transfer  -RR 2 --> Bed activities/dependent transfer  -RB              User Key  (r) = Recorded By, (t) = Taken By, (c) = Cosigned By      Initials Name Provider Type    RB Sarah Rainey, RN Registered Nurse    RR Yolanda Jerez, PT Physical Therapist                    Occupational Therapy Education       Title: PT OT SLP Therapies (Done)       Topic: Occupational Therapy (Done)       Point: ADL training (Done)       Description:   Instruct learner(s) on proper safety adaptation and remediation techniques during self care or transfers.   Instruct in proper use of assistive devices.                  Learning Progress Summary             Patient Acceptance, E,TB, VU by  at 8/12/2024 1549                                         User Key       Initials Effective Dates Name Provider Type Discipline     06/16/21 -  Kady George OT Occupational Therapist OT                  OT Recommendation and Plan  Therapy Frequency (OT): evaluation only  Plan of Care Review  Plan of Care Reviewed With: patient  Outcome Evaluation: Karina Saleem is a 83 y.o. female with a CMH of h/o polio, hypothyroidism and anxiety who presented to [facility] on 8/11/2024 from [facility] due to patient reports irregular heart rate. Dx with pna. Per EMS, patient was noted to be hypoxic with sats in the 70s and was placed on 15L NRB. Pt now  on RA.  Pt reports she has been using brian lift in facility.  She is hesitant to participate in evaluation interview.  She has R knee immobilizer from injury in July. Pt reports she does not typically bear any weight on LE.  She has been able to eat and drink in bed, though requires assist for all lower body ADLs.  Recommend return to SNF at discharge.     Time Calculation:         Time Calculation- OT       Row Name 08/12/24 1549             Time Calculation- OT    OT Start Time 1455  -SR      OT Stop Time 1510  -SR      OT Time Calculation (min) 15 min  -SR      Total Timed Code Minutes- OT 0 minute(s)  -SR      OT Received On 08/12/24  -SR                User Key  (r) = Recorded By, (t) = Taken By, (c) = Cosigned By      Initials Name Provider Type    SR Kady George OT Occupational Therapist                  Therapy Charges for Today       Code Description Service Date Service Provider Modifiers Qty    [id number] HC OT EVAL LOW COMPLEXITY 4 8/12/2024 Kady George OT GO 1                 Kady George OT  8/12/2024

## 2024-08-12 NOTE — CASE MANAGEMENT/SOCIAL WORK
Discharge Planning Assessment   Arpan     Patient Name: Karina Saleem  MRN: [id number]  Today's Date: 8/12/2024    Admit Date: 8/11/2024    Plan: Plan to return to [facility], pending bed availability (no bed hold).  No Precert required/  No new PASRR required.   Discharge Needs Assessment       Row Name 08/12/24 1153       Living Environment    People in Home alone    Current Living Arrangements apartment    Duration at Residence has been at [facility] but lives alone in an apartment as baseline    Potentially Unsafe Housing Conditions none    In the past 12 months has the electric, gas, oil, or water company threatened to shut off services in your home? No    Primary Care Provided by self    Provides Primary Care For no one    Family Caregiver if Needed other (see comments)  Cassidy sena    Quality of Family Relationships helpful;involved;supportive    Able to Return to Prior Arrangements yes       Resource/Environmental Concerns    Resource/Environmental Concerns none    Transportation Concerns none       Transportation Needs    In the past 12 months, has lack of transportation kept you from medical appointments or from getting medications? no    In the past 12 months, has lack of transportation kept you from meetings, work, or from getting things needed for daily living? No       Food Insecurity    Within the past 12 months, you worried that your food would run out before you got the money to buy more. Never true    Within the past 12 months, the food you bought just didn't last and you didn't have money to get more. Never true       Transition Planning    Patient/Family Anticipates Transition to home    Patient/Family Anticipated Services at Transition none    Transportation Anticipated car, drives self;family or friend will provide       Discharge Needs Assessment    Readmission Within the Last 30 Days no previous admission in last 30 days    Equipment Currently Used at Home hospital  bed;wheelchair    Concerns to be Addressed discharge planning    Anticipated Changes Related to Illness none    Equipment Needed After Discharge none    Outpatient/Agency/Support Group Needs skilled nursing facility    Discharge Facility/Level of Care Needs nursing facility, skilled                   Discharge Plan       Row Name 08/12/24 1156       Plan    Plan Plan to return to [facility], pending bed availability (no bed hold).  No Precert required/  No new PASRR required.    Patient/Family in Agreement with Plan yes    Plan Comments CM met with patient at bedside. Patient A&Ox4. Patient lives in an apartment alone at baseline and uses a WC to get around by herself. She has been at [facility] until this admit and patient/facility ok for her to return but she is not HOLDING a bed.  Will need transport at discharge. Patient performs some ADLs by self but has a caregiver from [facility] 3-4 days/wk. PCP and pharmacy confirmed. No need for M2B.  Denies financial assistance needs for medication and/or food. DC Barriers:  PICC, IV Abxs, 2L NC, Levo gtt.                  Continued Care and Services - Admitted Since 8/11/2024       Destination Coordination complete.      Service Provider Request Status Selected Services Address Phone Fax Patient Preferred    [facility]  [address] [place] [address] [phone] --                  Expected Discharge Date and Time       Expected Discharge Date Expected Discharge Time    Aug 14, 2024            Demographic Summary       Row Name 08/12/24 1151       General Information    Admission Type inpatient    Arrived From emergency department    Required Notices Provided Important Message from Medicare    Referral Source admission list    Reason for Consult discharge planning    Preferred Language English                   Functional Status       Row Name 08/12/24 1151       Functional Status     Usual Activity Tolerance moderate    Current Activity Tolerance moderate       Functional Status, IADL    Medications independent    Meal Preparation independent    Housekeeping independent    Laundry independent    Shopping assistive equipment and person       Mental Status    General Appearance WDL WDL       Mental Status Summary    Recent Changes in Mental Status/Cognitive Functioning no changes                Patient Forms       Row Name 08/12/24 1142       Patient Forms    Important Message from Medicare (IMM) Delivered  IMM reviewed, signed, copy given 8/12    Delivered to Patient    Method of delivery In person                      STANFORD Baeza RN  SIPS/ICU   O: [phone]  C: [phone]  [email or web address]

## 2024-08-12 NOTE — CONSULTS
PICC Line Insertion Procedure Note    Procedure: Insertion of #5 FR/16G PICC    Indications:  Pressors    Active Time Out:  Correct patient: Yes  Correct procedure: Yes  Correct site: Yes  Verified with: SANDI Kothari    Procedure Details:  Informed consent was obtained for the procedure.  Risk include, but are not limited to infection, air embolism, catheter tip moving, catheter blockage and phlebitis.     Maximum sterile technique was used including antiseptics, cap, gloves, gown, hand hygiene, mask, and sheet.    Ultrasound Guidance: Yes    #5 FR/16G PICC inserted to the R Basilic vein per hospital protocol by SANDI Ross.   Non-pulsatile blood return: yes    Lot #: [id number]  Expiration date: 05-    Complications:  none    Findings:  Catheter inserted to 36 cm, with 0 cm exposed.   Mid upper arm circumference is 26 cm.   Catheter was flushed with 30 cc NS and sterile dressing applied.  Patient tolerated procedure well.  PICC tip verified by:       [x] Sapiens 3cg       [] Chest X-ray    Recommendations:  Verbal and/or written Care/Maintenance instructions provided to patient.   Primary nurse notified.    Rufina Zhu RN  08/12/24  11:39 EDT

## 2024-08-12 NOTE — PLAN OF CARE
"Goal Outcome Evaluation:  Plan of Care Reviewed With: patient           Outcome Evaluation: A 84 y.o. old female patient with PMH of polio, hypothyroidism, iron deficiency, GERD, and anxiety who presented to [facility] on 8/11/2024  from [facility] due after patient reported her heart rate was \"going up and down\". Patient had otherwise denied chest pain, shortness of breath, cough, fever and chills. No oxygen at baseline. Per EMS, patient was noted to be hypoxic with sats in the 70s and was placed on 15L NRB. CXR revealed findings concerning for pneumonia/pneumonitis involving the left mid-lower lung. Patient was started on Vancomycin and Zosyn. Later that evening patient became hypotensive, and did not respond to adequate fluid resuscitation.  Therefore ICU was consulted for vasopressor support.   Patient was cleared for PT by nursing, found to be on RA with sats 97%.  Patient is oriented to person and time, somewhat unreliable historian at this point.  Patient becomes frustrated with subjective interview to gain understanding of previous functional status.  Most recently patient has been at [facility] since a fall approx 2 mo ago; she requires use of a mechanical lift for transfers.  2 mo prior patient was in her own home at the wheelchair level with caregivers a few days a week.  She was able to transfer her self.  Upon evaluation patient is dependent at the bed level and not safe to participate in fucntional transfers.  She should discharge back to SNF and likely transition to LTC.  She does not require skilled PT while IP, she will require new orders if mobility status changes.                               "

## 2024-08-12 NOTE — PLAN OF CARE
Goal Outcome Evaluation:  Plan of Care Reviewed With: patient           Outcome Evaluation: Karina Saleem is a 83 y.o. female with a CMH of h/o polio, hypothyroidism and anxiety who presented to [facility] on 8/11/2024 from [facility] due to patient reports irregular heart rate. Dx with pna. Per EMS, patient was noted to be hypoxic with sats in the 70s and was placed on 15L NRB. Pt now on RA.  Pt reports she has been using brian lift in facility.  She is hesitant to participate in evaluation interview.  She has R knee immobilizer from injury in July. Pt reports she does not typically bear any weight on LE.  She has been able to eat and drink in bed, though requires assist for all lower body ADLs.  Recommend return to SNF at discharge.      Anticipated Discharge Disposition (OT): skilled nursing facility

## 2024-08-12 NOTE — PLAN OF CARE
Goal Outcome Evaluation:      Patient alert and oriented. Intermittent repeating of sentences. Patient has been argumentative about turns. Patient currently has blanchable redness to bilateral elbows, hips, and coccyx. Blanchable redness covered with mepilex and education provided on importance of turning with a wedge every two hours. Patient refuses to use external catheter at this time. Briefs removed due to redness. Bedpan offered and patient refused. Levo gtt infusing and vitals stable at this time. Patient updated on plan of care as well as niece.

## 2024-08-12 NOTE — THERAPY EVALUATION
Patient Name: Karina Saleem  : 1940    MRN: [id number]                              Today's Date: 2024       Admit Date: 2024    Visit Dx:     ICD-10-CM ICD-9-CM   1. Pneumonia due to infectious organism, unspecified laterality, unspecified part of lung  J18.9 486     Patient Active Problem List   Diagnosis    Abnormal urinalysis    Arthralgia of right foot    Broken skin    Skin ulcer    Disuse osteoporosis    GERD (gastroesophageal reflux disease)    Hypothyroidism    Insomnia    Kyphosis    Kyphoscoliosis    Malaise and fatigue    Neuropathy, peripheral    Other dorsalgia    Pain of lower extremity    Poliomyelitis    Post poliomyelitis syndrome    Pressure ulcer, lower back    Right calf pain    Visit for screening mammogram    Vitamin D deficiency    Functional quadriplegia    Anxiety disorder, unspecified    Contracture of muscle, left hand    Contracture of muscle, right hand    Generalized muscle weakness    Immobility syndrome (paraplegic)    Iron deficiency anemia    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, multiple sites    Repeated falls    Unspecified open wound of unspecified front wall of thorax without penetration into thoracic cavity, subsequent encounter    Low back pain    Right upper quadrant abdominal pain    Pneumonia     Past Medical History:   Diagnosis Date    Anxiety     Cataracts, bilateral     Femur fracture, right     Hypothyroidism     Insomnia     Kyphoscoliosis     MAMMO     NEG = 2019    Osteoporosis     Paraplegic immobility syndrome     Peripheral neuropathy     Polio osteopathy of lower leg, left     Polio osteopathy of lower leg, right     Scoliosis      Past Surgical History:   Procedure Laterality Date    APPENDECTOMY      LIPOMA EXCISION Bilateral     breast    TOTAL ABDOMINAL HYSTERECTOMY        General Information       Row Name 24 1525          Physical Therapy Time and Intention    Document Type evaluation  -RR     Mode of Treatment physical therapy  -RR        Row Name 08/12/24 1525          General Information    Patient Profile Reviewed yes  -RR     Prior Level of Function dependent:;w/c or scooter  Patient most recently requires mechanical lift for functional transfer approx 6-8 weeks since patient was at home wheelchair level.  -RR     Existing Precautions/Restrictions --  R LE knee immobilizer  -RR     Barriers to Rehab medically complex;previous functional deficit  -RR       Row Name 08/12/24 1525          Living Environment    People in Home alone  resides alone with intermittent caregivers, most recently at [facility] for rehab (aprox 6 weeks to 2 months since patient was in her home)  -RR       Row Name 08/12/24 1525          Home Main Entrance    Number of Stairs, Main Entrance none  -RR       Row Name 08/12/24 1525          Stairs Within Home, Primary    Number of Stairs, Within Home, Primary none  -RR       Row Name 08/12/24 1525          Cognition    Orientation Status (Cognition) oriented to;person;place;time  -RR       Row Name 08/12/24 1525          Safety Issues, Functional Mobility    Comment, Safety Issues/Impairments (Mobility) mobility status  -RR               User Key  (r) = Recorded By, (t) = Taken By, (c) = Cosigned By      Initials Name Provider Type    RR Yolanda Jerez, PT Physical Therapist                   Mobility       Row Name 08/12/24 1535          Bed Mobility    All Activities, Dragoon (Bed Mobility) dependent (less than 25% patient effort);2 person assist  -RR       Row Name 08/12/24 1535          Bed-Chair Transfer    Bed-Chair Dragoon (Transfers) not tested  -RR       Row Name 08/12/24 1535          Sit-Stand Transfer    Sit-Stand Dragoon (Transfers) not tested  -RR       Row Name 08/12/24 1535          Gait/Stairs (Locomotion)    Dragoon Level (Gait) not tested  -RR     Patient was able to Ambulate no, other medical factors prevent ambulation  -RR     Reason Patient was unable to Ambulate  "Non-Ambulatory at Baseline  -RR               User Key  (r) = Recorded By, (t) = Taken By, (c) = Cosigned By      Initials Name Provider Type    Yolanda Simon PT Physical Therapist                   Obj/Interventions       Row Name 08/12/24 1535          Range of Motion Comprehensive    Comment, General Range of Motion ROM to BLE limited due to premorbid LE deformity and contracture post polio  -RR       Row Name 08/12/24 1534          Strength Comprehensive (MMT)    General Manual Muscle Testing (MMT) Assessment lower extremity strength deficits identified  -RR     Comment, General Manual Muscle Testing (MMT) Assessment 0/5 BLE  -RR       Row Name 08/12/24 153          Sensory Assessment (Somatosensory)    Sensory Assessment (Somatosensory) --  able to detect light touch BLE  -RR               User Key  (r) = Recorded By, (t) = Taken By, (c) = Cosigned By      Initials Name Provider Type    Yolanda Simon PT Physical Therapist                   Goals/Plan    No documentation.                  Clinical Impression       Row Name 08/12/24 1537          Pain    Pretreatment Pain Rating 0/10 - no pain  -RR     Posttreatment Pain Rating 0/10 - no pain  -RR       [facility] Name 08/12/24 1538          Plan of Care Review    Plan of Care Reviewed With patient  -RR     Outcome Evaluation A 84 y.o. old female patient with PMH of polio, hypothyroidism, iron deficiency, GERD, and anxiety who presented to [facility] on 8/11/2024  from [facility] after patient reported her heart rate was \"going up and down\". Patient had otherwise denied chest pain, shortness of breath, cough, fever and chills. No oxygen at baseline. Per EMS, patient was noted to be hypoxic with sats in the 70s and was placed on 15L NRB. CXR revealed findings concerning for pneumonia/pneumonitis involving the left mid-lower lung. Patient was started on Vancomycin and Zosyn. Later that evening patient became hypotensive, and did not respond " to adequate fluid resuscitation.  Therefore ICU was consulted for vasopressor support.   Patient was cleared for PT by nursing, found to be on RA with sats 97%.  Patient is oriented to person and time, somewhat unreliable historian at this point.  Patient becomes frustrated with subjective interview to gain understanding of previous functional status.  Most recently patient has been at [facility] since a fall approx 2 mo ago; she requires use of a mechanical lift for transfers.  2 mo prior patient was in her own home at the wheelchair level with caregivers a few days a week.  She was able to transfer her self.  Upon evaluation patient is dependent at the bed level and not safe to participate in fucntional transfers.  She should discharge back to SNF and likely transition to LTC.  She does not require skilled PT while IP, she will require new orders if mobility status changes.  -RR       Row Name 08/12/24 1537          Therapy Assessment/Plan (PT)    Patient/Family Therapy Goals Statement (PT) to get back home  -RR     Criteria for Skilled Interventions Met (PT) no;no problems identified which require skilled intervention;does not meet criteria for skilled intervention  -RR               User Key  (r) = Recorded By, (t) = Taken By, (c) = Cosigned By      Initials Name Provider Type    Yolanda Simon, PT Physical Therapist                   Outcome Measures       Row Name 08/12/24 1540 08/12/24 0814       How much help from another person do you currently need...    Turning from your back to your side while in flat bed without using bedrails? 1  -RR 1  -RB    Moving from lying on back to sitting on the side of a flat bed without bedrails? 1  -RR 1  -RB    Moving to and from a bed to a chair (including a wheelchair)? 1  -RR 1  -RB    Standing up from a chair using your arms (e.g., wheelchair, bedside chair)? 1  -RR 1  -RB    Climbing 3-5 steps with a railing? 1  -RR 1  -RB    To walk in hospital room? 1  -RR 1   "-RB    AM-PAC 6 Clicks Score (PT) 6  -RR 6  -RB    Highest Level of Mobility Goal 2 --> Bed activities/dependent transfer  -RR 2 --> Bed activities/dependent transfer  -RB              User Key  (r) = Recorded By, (t) = Taken By, (c) = Cosigned By      Initials Name Provider Type    RB Sarah Rainey, RN Registered Nurse    RR Yolanda Jerez, ZEYNEP Physical Therapist                                 Physical Therapy Education       Title: PT OT SLP Therapies (Done)       Topic: Physical Therapy (Done)       Point: Mobility training (Done)       Learning Progress Summary             Patient Nonacceptance, E,TB, VU by RR at 8/12/2024 1541    Comment: role of PT while IP, ongoing need for supervision/assist at d/c in SNF/ECF                                         User Key       Initials Effective Dates Name Provider Type Discipline     07/01/24 -  Yolanda Jerez PT Physical Therapist PT                  PT Recommendation and Plan     Plan of Care Reviewed With: patient  Outcome Evaluation: A 84 y.o. old female patient with PMH of polio, hypothyroidism, iron deficiency, GERD, and anxiety who presented to [facility] on 8/11/2024  from [facility] after patient reported her heart rate was \"going up and down\". Patient had otherwise denied chest pain, shortness of breath, cough, fever and chills. No oxygen at baseline. Per EMS, patient was noted to be hypoxic with sats in the 70s and was placed on 15L NRB. CXR revealed findings concerning for pneumonia/pneumonitis involving the left mid-lower lung. Patient was started on Vancomycin and Zosyn. Later that evening patient became hypotensive, and did not respond to adequate fluid resuscitation.  Therefore ICU was consulted for vasopressor support.   Patient was cleared for PT by nursing, found to be on RA with sats 97%.  Patient is oriented to person and time, somewhat unreliable historian at this point.  Patient becomes frustrated with subjective interview " to gain understanding of previous functional status.  Most recently patient has been at [facility] since a fall approx 2 mo ago; she requires use of a mechanical lift for transfers.  2 mo prior patient was in her own home at the wheelchair level with caregivers a few days a week.  She was able to transfer her self.  Upon evaluation patient is dependent at the bed level and not safe to participate in fucntional transfers.  She should discharge back to SNF and likely transition to LTC.  She does not require skilled PT while IP, she will require new orders if mobility status changes.     Time Calculation:         PT Charges       Row Name 08/12/24 1542             Time Calculation    Start Time 1431  -RR      Stop Time 1451  -RR      Time Calculation (min) 20 min  -RR      PT Received On 08/12/24  -RR                User Key  (r) = Recorded By, (t) = Taken By, (c) = Cosigned By      Initials Name Provider Type    RR Yolanda Jerez, ZEYNEP Physical Therapist                  Therapy Charges for Today       Code Description Service Date Service Provider Modifiers Qty    71075092834 HC PT EVAL MOD COMPLEXITY 4 8/12/2024 Yolanda Jerez, ZEYNEP GP 1            PT G-Codes  AM-PAC 6 Clicks Score (PT): 6  PT Discharge Summary  Anticipated Discharge Disposition (PT): extended care facility, skilled nursing facility, sub acute care setting    Yolanda Jerez PT  8/12/2024

## 2024-08-12 NOTE — CONSULTS
"Critical Care Consult Note   Karina Saleem : 1940 MRN:[id number] LOS:1 ROOM:      Reason for admission: Pneumonia     Assessment / Plan     Septic Shock: ( WBC>12 or <4 or >10% bands, HR>90, RR >20 or PCO2 <32, Lactic acid>2, Change in mental status, and MAP<65 or SBP<90 )  Likely due to pneumonia  Blood cultures pending   RVP negative  UA negative  Started on Levophed gtt  Persistent hypotension in spite of adequate fluid resuscitation  C/w additional fluids as needed. Avoid fluid overload   Titrate vasopressors for a target MAP of 65  Continue Zosyn    Chronic:  Hyperlipidemia: Not on statin therapy at home  Hypothyroidism: Continue home levothyroxine  History of polio: Patient is wheelchair-bound; continue home oxycodone for pain  GERD: PPI      Code Status (Patient has no pulse and is not breathing): No CPR (Do Not Attempt to Resuscitate)  Medical Interventions (Patient has pulse or is breathing): Full Support       Nutrition: Diet: Regular/House; Fluid Consistency: Thin (IDDSI 0) Patient isn't on Tube Feeding     VTE Prophylaxis:  Pharmacologic VTE prophylaxis orders are signed & held. Pharmacologic VTE prophylaxis orders are present.         History of Present illness     A 84 y.o. old female patient with PMH of polio, hypothyroidism, iron deficiency, GERD, and anxiety who presented to [facility] on 2024  from [facility] after patient reported her heart rate was \"going up and down\". Patient had otherwise denied chest pain, shortness of breath, cough, fever and chills. No oxygen at baseline. Per EMS, patient was noted to be hypoxic with sats in the 70s and was placed on 15L NRB. Patient also noted to be febrile with temp of 102.4. ABG with pH 7.42, PO2 58, pCO2 33.6. Labs were pertinent for WBC 15 with LA of 2.1. Troponin 16 with BNP of 243. CXR revealed findings concerning for pneumonia/pneumonitis involving the left mid-lower lung. Patient was started on Vancomycin and " Zosyn. Patient was admitted to the Hospitalist service for further management.  Later that evening patient became hypotensive, and did not respond to adequate fluid resuscitation.  Therefore ICU was consulted for vasopressor support.    ACP: Patient is alert and oriented x 4; declares herself full code.    Patient was seen and examined on 08/12/24 at 00:10 EDT .    Subjective / Review of systems     Review of Systems   Constitutional:  Negative for chills and fever.   HENT:  Negative for congestion and sore throat.    Respiratory:  Negative for cough and shortness of breath.    Cardiovascular:  Positive for palpitations. Negative for chest pain.   Gastrointestinal:  Negative for abdominal pain and nausea.   Endocrine: Negative for heat intolerance and polyuria.   Genitourinary:  Negative for dysuria and urgency.   Musculoskeletal:  Negative for arthralgias and myalgias.   Skin:  Negative for rash and wound.   Neurological:  Negative for weakness and numbness.   Psychiatric/Behavioral:  Negative for suicidal ideas. The patient is not nervous/anxious.         Past Medical/Surgical/Social/Family History & Allergies     Past Medical History:   Diagnosis Date    Anxiety     Cataracts, bilateral     Femur fracture, right     Hypothyroidism     Insomnia     Kyphoscoliosis     MAMMO     NEG = 2019    Osteoporosis     Paraplegic immobility syndrome     Peripheral neuropathy     Polio osteopathy of lower leg, left     Polio osteopathy of lower leg, right     Scoliosis       Past Surgical History:   Procedure Laterality Date    APPENDECTOMY      LIPOMA EXCISION Bilateral     breast    TOTAL ABDOMINAL HYSTERECTOMY        Social History     Socioeconomic History    Marital status: Single   Tobacco Use    Smoking status: Never     Passive exposure: Never    Smokeless tobacco: Never   Vaping Use    Vaping status: Never Used   Substance and Sexual Activity    Alcohol use: No    Drug use: No    Sexual activity: Defer      Family  History   Problem Relation Age of Onset    Diabetes Mother     Heart disease Mother         CHF    Heart failure Father     Heart disease Father     COPD Father         BLACK LUNG    Lymphoma Sister     Lung cancer Brother     Pancreatic cancer Brother     Bone cancer Brother     Cancer Brother       Allergies   Allergen Reactions    Latex Rash    Ceftazidime Unknown - High Severity and Other (See Comments)    Furosemide Hives, Unknown (See Comments) and Unknown - Low Severity    Pantoprazole Nausea And Vomiting and Unknown - Low Severity    Pantoprazole Sodium Nausea And Vomiting    Tizanidine Dizziness and Other (See Comments)        Home Medications     Prior to Admission medications    Medication Sig Start Date End Date Taking? Authorizing Provider   diazePAM (VALIUM) 5 MG tablet Take 1 tablet by mouth Daily.   Yes Mor Chao MD   docusate sodium 100 MG capsule Take 1 capsule by mouth As Needed.   Yes Mor Chao MD   Enoxaparin Sodium (LOVENOX) 40 MG/0.4ML solution prefilled syringe syringe Inject 0.4 mL under the skin into the appropriate area as directed Daily.   Yes Mor Chao MD   fentaNYL (DURAGESIC) 100 MCG/HR patch Place 1 patch on the skin as directed by provider Every 72 (Seventy-Two) Hours. 6/4/24  Yes Aditi Agarwal DO   gabapentin (NEURONTIN) 400 MG capsule Take 1 capsule by mouth 3 (Three) Times a Day.   Yes Mor Chao MD   levothyroxine (SYNTHROID, LEVOTHROID) 137 MCG tablet Take 1 tablet by mouth Daily. 5/9/24  Yes Aditi Agarwal DO   oxyCODONE-acetaminophen (PERCOCET)  MG per tablet Take 1 tablet by mouth Every 4 (Four) Hours As Needed for Moderate Pain.   Yes Mor Chao MD   zolpidem (AMBIEN) 10 MG tablet Take 1 tablet by mouth every night at bedtime. 6/3/24  Yes Aditi Agarwal DO   acetaminophen (TYLENOL) 325 MG tablet Take 2 tablets by mouth Every 4 (Four) Hours As Needed for Mild Pain.    Mor Chao MD   ondansetron  ODT (ZOFRAN-ODT) 8 MG disintegrating tablet Place 1 tablet on the tongue Every 4 (Four) Hours As Needed for Nausea or Vomiting.    Provider, Historical, MD   senna 8.6 MG tablet Take 1 tablet by mouth Every Night.    Provider, Mor, MD        Objective / Physical Exam     Vital signs:  Temp: 97.8 °F (36.6 °C)  BP: 95/41  Heart Rate: 66  Resp: 8  SpO2: 97 %  Weight: 54.4 kg (120 lb)    Admission Weight: Weight: 54.4 kg (120 lb)    Physical Exam  Constitutional:       Appearance: Normal appearance. She is normal weight. She is ill-appearing.   HENT:      Head: Normocephalic.      Nose: Nose normal.      Mouth/Throat:      Mouth: Mucous membranes are moist.   Eyes:      Extraocular Movements: Extraocular movements intact.      Pupils: Pupils are equal, round, and reactive to light.   Cardiovascular:      Pulses: Normal pulses.      Heart sounds: Normal heart sounds.   Pulmonary:      Effort: Pulmonary effort is normal.      Breath sounds: Normal breath sounds.   Abdominal:      General: Abdomen is flat. Bowel sounds are normal.      Palpations: Abdomen is soft.   Musculoskeletal:         General: Normal range of motion.      Right lower leg: Deformity present.      Left lower leg: Deformity present.      Comments: Bilateral lower extremities are flaccid from history of polio   Skin:     General: Skin is warm and dry.      Coloration: Skin is pale.   Neurological:      General: No focal deficit present.      Mental Status: She is oriented to person, place, and time.   Psychiatric:         Attention and Perception: Attention normal.         Mood and Affect: Affect is angry.         Behavior: Behavior is agitated.          Labs     Results from last 7 days   Lab Units 08/11/24  1257   WBC 10*3/mm3 15.02*   HEMATOCRIT % 44.2   PLATELETS 10*3/mm3 200      Results from last 7 days   Lab Units 08/11/24  1257   SODIUM mmol/L 130*   POTASSIUM mmol/L 4.5   CHLORIDE mmol/L 98   CO2 mmol/L 20.2*   BUN mg/dL 13   CREATININE  mg/dL 0.36*        Imaging     XR Chest 1 View    Result Date: 8/11/2024  Impression: Findings concerning for pneumonia/pneumonitis involving the left mid to lower lung. Additional chronic findings as characterized above Electronically Signed: Nestor DO Silas  8/11/2024 1:15 PM EDT  Workstation ID: [id number]      Current Medications     Scheduled Meds:  piperacillin-tazobactam, 3.375 g, Intravenous, Q8H         Continuous Infusions:  norepinephrine, 0.02-0.5 mcg/kg/min       Patient continues to be critically ill, remains at risk of clinical deterioration or death and needed high complexity decision making. I have spent a total of 40 minutes providing critical care services to this patient including but not limited to: review of labs/ microbiology/imaging/medications, serial monitoring of vital signs, review of other consultant's notes, review of events in the last 24 hrs, monitoring input/output, review of treatment plan with bedside nurse, RT and other treatment team, management of life support and nutrition needs.No family at bedside.    Time spent in performing separately billable procedures and updating family is not included in the critical care time.       MICHEAL Bazan   Critical Care  08/12/24   00:10 EDT

## 2024-08-13 LAB
ANION GAP SERPL CALCULATED.3IONS-SCNC: 5.3 MMOL/L (ref 5–15)
BASOPHILS # BLD AUTO: 0.04 10*3/MM3 (ref 0–0.2)
BASOPHILS NFR BLD AUTO: 0.3 % (ref 0–1.5)
BUN SERPL-MCNC: 11 MG/DL (ref 8–23)
BUN/CREAT SERPL: 40.7 (ref 7–25)
CALCIUM SPEC-SCNC: 8.4 MG/DL (ref 8.6–10.5)
CHLORIDE SERPL-SCNC: 104 MMOL/L (ref 98–107)
CO2 SERPL-SCNC: 25.7 MMOL/L (ref 22–29)
CREAT SERPL-MCNC: 0.27 MG/DL (ref 0.57–1)
DEPRECATED RDW RBC AUTO: 45.6 FL (ref 37–54)
EGFRCR SERPLBLD CKD-EPI 2021: 106.1 ML/MIN/1.73
EOSINOPHIL # BLD AUTO: 0.35 10*3/MM3 (ref 0–0.4)
EOSINOPHIL NFR BLD AUTO: 2.4 % (ref 0.3–6.2)
ERYTHROCYTE [DISTWIDTH] IN BLOOD BY AUTOMATED COUNT: 13.4 % (ref 12.3–15.4)
GLUCOSE SERPL-MCNC: 121 MG/DL (ref 65–99)
HCT VFR BLD AUTO: 34.5 % (ref 34–46.6)
HGB BLD-MCNC: 10.8 G/DL (ref 12–15.9)
IMM GRANULOCYTES # BLD AUTO: 0.05 10*3/MM3 (ref 0–0.05)
IMM GRANULOCYTES NFR BLD AUTO: 0.3 % (ref 0–0.5)
LYMPHOCYTES # BLD AUTO: 1.36 10*3/MM3 (ref 0.7–3.1)
LYMPHOCYTES NFR BLD AUTO: 9.4 % (ref 19.6–45.3)
MAGNESIUM SERPL-MCNC: 1.9 MG/DL (ref 1.6–2.4)
MCH RBC QN AUTO: 29.1 PG (ref 26.6–33)
MCHC RBC AUTO-ENTMCNC: 31.3 G/DL (ref 31.5–35.7)
MCV RBC AUTO: 93 FL (ref 79–97)
MONOCYTES # BLD AUTO: 0.7 10*3/MM3 (ref 0.1–0.9)
MONOCYTES NFR BLD AUTO: 4.8 % (ref 5–12)
NEUTROPHILS NFR BLD AUTO: 11.95 10*3/MM3 (ref 1.7–7)
NEUTROPHILS NFR BLD AUTO: 82.8 % (ref 42.7–76)
NRBC BLD AUTO-RTO: 0 /100 WBC (ref 0–0.2)
PLATELET # BLD AUTO: 187 10*3/MM3 (ref 140–450)
PMV BLD AUTO: 10.5 FL (ref 6–12)
POTASSIUM SERPL-SCNC: 3.9 MMOL/L (ref 3.5–5.2)
RBC # BLD AUTO: 3.71 10*6/MM3 (ref 3.77–5.28)
SODIUM SERPL-SCNC: 135 MMOL/L (ref 136–145)
WBC NRBC COR # BLD AUTO: 14.45 10*3/MM3 (ref 3.4–10.8)

## 2024-08-13 PROCEDURE — 25010000002 ENOXAPARIN PER 10 MG: Performed by: INTERNAL MEDICINE

## 2024-08-13 PROCEDURE — 80048 BASIC METABOLIC PNL TOTAL CA: CPT

## 2024-08-13 PROCEDURE — 83735 ASSAY OF MAGNESIUM: CPT

## 2024-08-13 PROCEDURE — 25010000002 PIPERACILLIN SOD-TAZOBACTAM PER 1 G

## 2024-08-13 PROCEDURE — 85025 COMPLETE CBC W/AUTO DIFF WBC: CPT

## 2024-08-13 RX ADMIN — Medication 5 MG: at 01:40

## 2024-08-13 RX ADMIN — MIDODRINE HYDROCHLORIDE 10 MG: 5 TABLET ORAL at 17:17

## 2024-08-13 RX ADMIN — MIDODRINE HYDROCHLORIDE 10 MG: 5 TABLET ORAL at 01:40

## 2024-08-13 RX ADMIN — PIPERACILLIN AND TAZOBACTAM 3.38 G: 3; .375 INJECTION, POWDER, FOR SOLUTION INTRAVENOUS at 01:40

## 2024-08-13 RX ADMIN — PIPERACILLIN AND TAZOBACTAM 3.38 G: 3; .375 INJECTION, POWDER, FOR SOLUTION INTRAVENOUS at 17:26

## 2024-08-13 RX ADMIN — MIDODRINE HYDROCHLORIDE 10 MG: 5 TABLET ORAL at 09:03

## 2024-08-13 RX ADMIN — PIPERACILLIN AND TAZOBACTAM 3.38 G: 3; .375 INJECTION, POWDER, FOR SOLUTION INTRAVENOUS at 09:37

## 2024-08-13 RX ADMIN — ENOXAPARIN SODIUM 40 MG: 100 INJECTION SUBCUTANEOUS at 16:13

## 2024-08-13 NOTE — NURSING NOTE
Pt HR has been in the 40s majority of the night, pt asymptomatic. Low dose levophed titrated off at this time. Pt remains on 2L NC. Pt demanded that this nurse remove the positioning wedge at 0600. Pt has been educated on importance of repositioning in the bed. Morning labs reviewed, sodium 135 and BUN trending up, NP made aware, no new orders. VSS.

## 2024-08-13 NOTE — PROGRESS NOTES
"Critical Care Progress Note     Karina Saleem : 1940 MRN:[id number] LOS:2  Rm: 2310/1     Principal Problem: Pneumonia     Reason for follow up: All the medical problems listed below    Summary     Karina Saleem is an 84 y.o. old female patient with PMH of polio, hypothyroidism, iron deficiency, GERD, and anxiety who presented to [facility] on 2024  from [facility] after patient reported her heart rate was \"going up and down\". Patient had otherwise denied chest pain, shortness of breath, cough, fever and chills. No oxygen at baseline. Per EMS, patient was noted to be hypoxic with sats in the 70s and was placed on 15L NRB. Patient also noted to be febrile with temp of 102.4. ABG with pH 7.42, PO2 58, pCO2 33.6. Labs were pertinent for WBC 15 with LA of 2.1. Troponin 16 with BNP of 243. CXR revealed findings concerning for pneumonia/pneumonitis involving the left mid-lower lung. Patient was started on Vancomycin and Zosyn. Patient was admitted to the Hospitalist service for further management.  Later that evening patient became hypotensive, and did not respond to adequate fluid resuscitation.  Therefore ICU was consulted for vasopressor support.  Vasopressors titrated off.  Stable for downgrade out of ICU.  Hospitalist service consulted.     ACP: Patient is alert and oriented x 4; declares herself full code.    Significant Events     24 : Off of vasopressors.  Stable for downgrade out of ICU.  Sepsis workup continues to be negative.  Hospitalist consulted.    Assessment / Plan     Septic Shock  Likely due to pneumonia  Blood cultures pending with no growth.  RVP negative  UA negative  Persistent hypotension in spite of adequate fluid resuscitation  C/w additional fluids as needed. Avoid fluid overload   Titrate vasopressors for a target MAP of 65, Levophed weaned off.  Continue Zosyn     Chronic:  Hyperlipidemia: Not on statin therapy at home  Hypothyroidism: Continue home " levothyroxine  History of polio: Patient is wheelchair-bound; continue home oxycodone for pain  GERD: PPI    Disposition:  Downgrade out of ICU, expected discharge in next day or two.    Code status:   Medical Intervention Limits: No intubation (DNI)  Level Of Support Discussed With: Patient  Code Status (Patient has no pulse and is not breathing): No CPR (Do Not Attempt to Resuscitate)  Medical Interventions (Patient has pulse or is breathing): Limited Support       Nutrition:   Diet: Regular/House; Fluid Consistency: Thin (IDDSI 0)   Patient isn't on Tube Feeding     VTE Prophylaxis:  Pharmacologic VTE prophylaxis orders are signed & held. Pharmacologic VTE prophylaxis orders are present.         Subjective / Review of systems     Review of Systems   Constitutional:  Negative for chills and fever.   HENT:  Negative for congestion and sore throat.    Respiratory:  Negative for cough and shortness of breath.    Cardiovascular:  Negative for chest pain and palpitations.   Gastrointestinal:  Negative for abdominal pain and nausea.   Endocrine: Negative for heat intolerance and polyuria.   Genitourinary:  Negative for dysuria and urgency.   Musculoskeletal:  Positive for arthralgias (Chronic.) and back pain (Chronic.). Negative for myalgias.   Skin:  Negative for rash and wound.   Neurological:  Positive for weakness. Negative for numbness.        Baseline of wheelchair bound.   Psychiatric/Behavioral:  Negative for confusion. The patient is not nervous/anxious.         Objective / Physical Exam     Vital signs:  Temp: 98 °F (36.7 °C)  BP: 125/54  Heart Rate: (!) 43  Resp: 12  SpO2: 95 %  Weight: 63.8 kg (140 lb 10.5 oz)    Admission Weight: Weight: 54.4 kg (120 lb)  Current Weight: Weight: 63.8 kg (140 lb 10.5 oz)    Input/Output in last 24 hours:    Intake/Output Summary (Last 24 hours) at 8/13/2024 0918  Last data filed at 8/13/2024 0539  Gross per 24 hour   Intake 589 ml   Output --   Net 589 ml      Net IO Since  Admission: 3,426 mL [08/13/24 0918]     Physical Exam  Vitals and nursing note reviewed.   Constitutional:       General: She is not in acute distress.     Appearance: She is ill-appearing. She is not toxic-appearing or diaphoretic.   HENT:      Head: Normocephalic and atraumatic.      Nose: Nose normal. No congestion.      Mouth/Throat:      Mouth: Mucous membranes are moist.      Pharynx: Oropharynx is clear. No oropharyngeal exudate.   Eyes:      General: No scleral icterus.     Extraocular Movements: Extraocular movements intact.      Conjunctiva/sclera: Conjunctivae normal.      Pupils: Pupils are equal, round, and reactive to light.   Cardiovascular:      Rate and Rhythm: Regular rhythm. Bradycardia present.      Pulses: Normal pulses.      Heart sounds: Normal heart sounds.   Pulmonary:      Effort: Pulmonary effort is normal.      Comments: Diminished bibasilar breath sounds, without wheezes, rhonchi, or rales.  Abdominal:      General: Bowel sounds are normal. There is no distension.      Palpations: Abdomen is soft.      Tenderness: There is no abdominal tenderness.   Musculoskeletal:      Cervical back: Neck supple.      Right lower leg: No edema.      Left lower leg: No edema.   Skin:     General: Skin is warm and dry.      Findings: No rash.   Neurological:      General: No focal deficit present.      Mental Status: She is alert and oriented to person, place, and time. Mental status is at baseline.      Comments: Generalized deconditioning, at baseline.   Psychiatric:      Comments: Calm, cooperative.          Radiology and Labs     Results from last 7 days   Lab Units 08/13/24  0504 08/12/24  0536 08/11/24  1257   WBC 10*3/mm3 14.45* 24.10* 15.02*   HEMOGLOBIN g/dL 10.8* 11.3* 13.4   HEMATOCRIT % 34.5 35.2 44.2   PLATELETS 10*3/mm3 187 217 200      Results from last 7 days   Lab Units 08/11/24  1430   PROTIME Seconds 11.0   INR  1.01   APTT seconds 29.4*      Results from last 7 days   Lab Units  08/13/24  0504 08/12/24  0614 08/11/24  1257   SODIUM mmol/L 135* 136 130*   POTASSIUM mmol/L 3.9 3.8 4.5   CHLORIDE mmol/L 104 105 98   CO2 mmol/L 25.7 24.4 20.2*   BUN mg/dL 11 10 13   CREATININE mg/dL 0.27* 0.32* 0.36*   GLUCOSE mg/dL 121* 105* 116*   MAGNESIUM mg/dL 1.9 1.8  --       Results from last 7 days   Lab Units 08/11/24  1257   ALK PHOS U/L 80   AST (SGOT) U/L 19   ALT (SGPT) U/L 6     Results from last 7 days   Lab Units 08/11/24  1312   PH, ARTERIAL pH units 7.427   PCO2, ARTERIAL mm Hg 33.6*   PO2 ART mm Hg 58.2*   O2 SATURATION ART % 90.8*   FIO2 % 28   HCO3 ART mmol/L 22.2   BASE EXCESS ART mmol/L -1.5*       XR Chest 1 View    Result Date: 8/12/2024  Impression: Bibasilar opacities similar to prior exam, which could reflect atelectasis or pneumonia. Electronically Signed: Bon Mcmillan MD  8/12/2024 11:25 AM EDT  Workstation ID: [id number]    XR Chest 1 View    Result Date: 8/11/2024  Impression: Findings concerning for pneumonia/pneumonitis involving the left mid to lower lung. Additional chronic findings as characterized above Electronically Signed: Nestor Rosen DO  8/11/2024 1:15 PM EDT  Workstation ID: [id number]     Current medications     Scheduled Meds:   fentaNYL, 1 patch, Transdermal, Q72H   And  Check Fentanyl Patch Placement, 1 each, Does not apply, Q12H  enoxaparin, 40 mg, Subcutaneous, Q24H  midodrine, 10 mg, Oral, Q8H  piperacillin-tazobactam, 3.375 g, Intravenous, Q8H        Continuous Infusions:   norepinephrine, 0.02-0.5 mcg/kg/min, Last Rate: Stopped (08/13/24 0330)          Plan discussed with RN. Reviewed all other data in the last 24 hours, including but not limited to vitals, labs, microbiology, imaging and pertinent notes from other providers.  Plan also discussed with patient at the bedside.      MICHEAL St   Critical Care  08/13/24   09:18 EDT

## 2024-08-13 NOTE — CASE MANAGEMENT/SOCIAL WORK
Continued Stay Note  [facility]     Patient Name: Karina Saleem  MRN: [id number]  Today's Date: 8/13/2024    Admit Date: 8/11/2024    Plan: Plan to return to [facility], pending bed availability (no bed hold). No Precert required/ No new PASRR required.   Discharge Plan       Row Name 08/13/24 0917       Plan    Plan Plan to return to [facility], pending bed availability (no bed hold). No Precert required/ No new PASRR required.    Plan Comments DC Barriers: PICC, IV Abxs, 2L NC.               Expected Discharge Date and Time       Expected Discharge Date Expected Discharge Time    Aug 14, 2024               STANFORD Baeza RN  SIPS/ICU   O: [phone]  C: [phone]  [email or web address]

## 2024-08-13 NOTE — CONSULTS
"    [facility] MEDICINE SERVICE  TRANSFER OF CARE/ACCEPTANCE NOTE    PATIENT NAME: Karina Saleem  : 1940  MRN: [id number]     Active Hospital Problems    Diagnosis  POA    **Pneumonia [J18.9]  Yes    Generalized muscle weakness [M62.81]  Yes    Iron deficiency anemia [D50.9]  Yes    Hypothyroidism [E03.9]  Yes      Resolved Hospital Problems   No resolved problems to display.       Patient seen and examined by me on 24 at 5:26 PM EDT  .  Interim History:A 84 y.o. old female patient with PMH of polio, hypothyroidism, iron deficiency, GERD, and anxiety who presented to [facility] on 2024  from [facility] after patient reported her heart rate was \"going up and down\". Patient had otherwise denied chest pain, shortness of breath, cough, fever and chills. No oxygen at baseline. Per EMS, patient was noted to be hypoxic with sats in the 70s and was placed on 15L NRB. Patient also noted to be febrile with temp of 102.4. ABG with pH 7.42, PO2 58, pCO2 33.6. Labs were pertinent for WBC 15 with LA of 2.1. Troponin 16 with BNP of 243. CXR revealed findings concerning for pneumonia/pneumonitis involving the left mid-lower lung. Patient was started on Vancomycin and Zosyn. Patient was admitted to the Hospitalist service for further management.  Later that evening patient became hypotensive, and did not respond to adequate fluid resuscitation.  Therefore ICU was consulted for vasopressor support.     Pressures are stabilized with most recent blood pressure 122/50.  Patient is bradycardic however at baseline at 45.  Levophed was discontinued today and pressures have remained stable.  Patient has been downgraded to medical inpatient for further care.    Patient reports feeling better, tolerating p.o.    I have noted the following changes since admission: Patient's pressures have remained stable, he is currently on 2 L nasal cannula at 95%.    Recent vitals \"/50, HR 45, respiratory rate " 12, SpO2 95% on 2 L NC, temperature 98    I have reviewed the H&P, diagnostic data and plan of care for Karina Saleem.  I will be taking over care of this patient during the current hospitalization.        Signature: Electronically signed by MICHEAL Callaway, 08/13/24, 18:26 EDT.  [facility] Hospitalist Team

## 2024-08-13 NOTE — ACP (ADVANCE CARE PLANNING)
"Social Work Assessment  [facility]     Patient Name: Karina Saleem  MRN: [id number]  Today's Date: 2024    Admit Date: 2024     Demographic Summary       Row Name 24 0762       General Information    Reason for Consult health care directive      General Information Comments SW was consulted re: pt wanting to discuss LW and HCR. SW met with pt at bedside in room 2310, no visitors present. Pt A&Ox3. CLAUDIA printed and reviewed the most recent ACP document on file at this facility from  with pt, in which pt appointed her friend Rosie as primary, her friend Remy as secondary, and her niece Cassidy as tertiary HCR. Pt stated Rosie and Remy have since been removed, but then said she hasn't had the time to get the document updated. SW offered to complete a new HCR document to which pt declined, stating, \"I don't do legal things out here in the open,\" despite no one else being in the room. CLAUDIA explained to pt that, unless there is updated paperwork, Rosie is the primary HCR that she appointed. Of note, pt reports being single, having no children, her parents and all siblings are . Pt reports there is one niece (Cassidy) that she is in contact with, but no other kin. CLAUDIA text [facility] liaison Lori @[phone], requesting any updated ACP documents they have for pt - awaiting reply. Pt requested her ACP document be removed from her room, but accepted a blank ACP packet to review.             PETE Aguayo, W  Medical Social Worker  Ph [phone]  Fax [phone]  [email or web address]    "

## 2024-08-13 NOTE — PLAN OF CARE
Goal Outcome Evaluation:      Pt remains medical inpatient. Sinus joseluis throughout the shift but otherwise vitals stable. Awaiting orders from hospitalist.

## 2024-08-13 NOTE — PLAN OF CARE
Goal Outcome Evaluation:              Pt is A/Ox4. Pt has been resting in bed throughout day. Pt has had no complaints today. Pt downgraded to MMOF. No other changes.

## 2024-08-14 LAB
ANION GAP SERPL CALCULATED.3IONS-SCNC: 8 MMOL/L (ref 5–15)
BASOPHILS # BLD AUTO: 0.08 10*3/MM3 (ref 0–0.2)
BASOPHILS NFR BLD AUTO: 0.7 % (ref 0–1.5)
BUN SERPL-MCNC: 12 MG/DL (ref 8–23)
BUN/CREAT SERPL: 40 (ref 7–25)
CALCIUM SPEC-SCNC: 8.5 MG/DL (ref 8.6–10.5)
CHLORIDE SERPL-SCNC: 104 MMOL/L (ref 98–107)
CO2 SERPL-SCNC: 24 MMOL/L (ref 22–29)
CREAT SERPL-MCNC: 0.3 MG/DL (ref 0.57–1)
DEPRECATED RDW RBC AUTO: 48.3 FL (ref 37–54)
EGFRCR SERPLBLD CKD-EPI 2021: 103.5 ML/MIN/1.73
EOSINOPHIL # BLD AUTO: 0.35 10*3/MM3 (ref 0–0.4)
EOSINOPHIL NFR BLD AUTO: 2.9 % (ref 0.3–6.2)
ERYTHROCYTE [DISTWIDTH] IN BLOOD BY AUTOMATED COUNT: 13.7 % (ref 12.3–15.4)
GLUCOSE SERPL-MCNC: 99 MG/DL (ref 65–99)
HCT VFR BLD AUTO: 37.4 % (ref 34–46.6)
HGB BLD-MCNC: 11.4 G/DL (ref 12–15.9)
IMM GRANULOCYTES # BLD AUTO: 0.04 10*3/MM3 (ref 0–0.05)
IMM GRANULOCYTES NFR BLD AUTO: 0.3 % (ref 0–0.5)
LYMPHOCYTES # BLD AUTO: 1.46 10*3/MM3 (ref 0.7–3.1)
LYMPHOCYTES NFR BLD AUTO: 12.1 % (ref 19.6–45.3)
MAGNESIUM SERPL-MCNC: 2 MG/DL (ref 1.6–2.4)
MCH RBC QN AUTO: 29.1 PG (ref 26.6–33)
MCHC RBC AUTO-ENTMCNC: 30.5 G/DL (ref 31.5–35.7)
MCV RBC AUTO: 95.4 FL (ref 79–97)
MONOCYTES # BLD AUTO: 0.71 10*3/MM3 (ref 0.1–0.9)
MONOCYTES NFR BLD AUTO: 5.9 % (ref 5–12)
NEUTROPHILS NFR BLD AUTO: 78.1 % (ref 42.7–76)
NEUTROPHILS NFR BLD AUTO: 9.4 10*3/MM3 (ref 1.7–7)
NRBC BLD AUTO-RTO: 0 /100 WBC (ref 0–0.2)
PLATELET # BLD AUTO: 198 10*3/MM3 (ref 140–450)
PMV BLD AUTO: 10.8 FL (ref 6–12)
POTASSIUM SERPL-SCNC: 4.3 MMOL/L (ref 3.5–5.2)
RBC # BLD AUTO: 3.92 10*6/MM3 (ref 3.77–5.28)
SODIUM SERPL-SCNC: 136 MMOL/L (ref 136–145)
TSH SERPL DL<=0.05 MIU/L-ACNC: 2.14 UIU/ML (ref 0.27–4.2)
WBC NRBC COR # BLD AUTO: 12.04 10*3/MM3 (ref 3.4–10.8)

## 2024-08-14 PROCEDURE — 25010000002 ENOXAPARIN PER 10 MG: Performed by: INTERNAL MEDICINE

## 2024-08-14 PROCEDURE — 80048 BASIC METABOLIC PNL TOTAL CA: CPT | Performed by: NURSE PRACTITIONER

## 2024-08-14 PROCEDURE — 84443 ASSAY THYROID STIM HORMONE: CPT | Performed by: NURSE PRACTITIONER

## 2024-08-14 PROCEDURE — 25010000002 PIPERACILLIN SOD-TAZOBACTAM PER 1 G

## 2024-08-14 PROCEDURE — 83735 ASSAY OF MAGNESIUM: CPT

## 2024-08-14 PROCEDURE — 99222 1ST HOSP IP/OBS MODERATE 55: CPT | Performed by: INTERNAL MEDICINE

## 2024-08-14 PROCEDURE — 85025 COMPLETE CBC W/AUTO DIFF WBC: CPT | Performed by: NURSE PRACTITIONER

## 2024-08-14 RX ORDER — OXYCODONE HYDROCHLORIDE 5 MG/1
10 TABLET ORAL EVERY 4 HOURS PRN
Status: DISCONTINUED | OUTPATIENT
Start: 2024-08-14 | End: 2024-08-14

## 2024-08-14 RX ORDER — ENOXAPARIN SODIUM 100 MG/ML
40 INJECTION SUBCUTANEOUS DAILY
Status: DISCONTINUED | OUTPATIENT
Start: 2024-08-14 | End: 2024-08-14 | Stop reason: SDUPTHER

## 2024-08-14 RX ORDER — DIAZEPAM 5 MG/1
5 TABLET ORAL DAILY PRN
Status: DISCONTINUED | OUTPATIENT
Start: 2024-08-14 | End: 2024-08-16 | Stop reason: HOSPADM

## 2024-08-14 RX ORDER — DIAZEPAM 5 MG/1
5 TABLET ORAL DAILY
Status: DISCONTINUED | OUTPATIENT
Start: 2024-08-14 | End: 2024-08-14

## 2024-08-14 RX ORDER — OXYCODONE HYDROCHLORIDE 5 MG/1
5 TABLET ORAL EVERY 4 HOURS PRN
Status: DISCONTINUED | OUTPATIENT
Start: 2024-08-14 | End: 2024-08-16 | Stop reason: HOSPADM

## 2024-08-14 RX ORDER — SENNOSIDES A AND B 8.6 MG/1
1 TABLET, FILM COATED ORAL NIGHTLY
Status: DISCONTINUED | OUTPATIENT
Start: 2024-08-14 | End: 2024-08-16 | Stop reason: HOSPADM

## 2024-08-14 RX ORDER — GABAPENTIN 400 MG/1
400 CAPSULE ORAL 3 TIMES DAILY
Status: DISCONTINUED | OUTPATIENT
Start: 2024-08-14 | End: 2024-08-16 | Stop reason: HOSPADM

## 2024-08-14 RX ORDER — ZOLPIDEM TARTRATE 5 MG/1
10 TABLET ORAL NIGHTLY PRN
Status: DISCONTINUED | OUTPATIENT
Start: 2024-08-14 | End: 2024-08-16 | Stop reason: HOSPADM

## 2024-08-14 RX ADMIN — OXYCODONE HYDROCHLORIDE 5 MG: 5 TABLET ORAL at 19:31

## 2024-08-14 RX ADMIN — LEVOTHYROXINE SODIUM 137 MCG: 0.11 TABLET ORAL at 08:01

## 2024-08-14 RX ADMIN — ENOXAPARIN SODIUM 40 MG: 100 INJECTION SUBCUTANEOUS at 15:10

## 2024-08-14 RX ADMIN — PIPERACILLIN AND TAZOBACTAM 3.38 G: 3; .375 INJECTION, POWDER, FOR SOLUTION INTRAVENOUS at 09:30

## 2024-08-14 RX ADMIN — MIDODRINE HYDROCHLORIDE 10 MG: 5 TABLET ORAL at 09:28

## 2024-08-14 RX ADMIN — MIDODRINE HYDROCHLORIDE 10 MG: 5 TABLET ORAL at 01:35

## 2024-08-14 RX ADMIN — GABAPENTIN 400 MG: 400 CAPSULE ORAL at 20:18

## 2024-08-14 RX ADMIN — PIPERACILLIN AND TAZOBACTAM 3.38 G: 3; .375 INJECTION, POWDER, FOR SOLUTION INTRAVENOUS at 01:35

## 2024-08-14 RX ADMIN — PIPERACILLIN AND TAZOBACTAM 3.38 G: 3; .375 INJECTION, POWDER, FOR SOLUTION INTRAVENOUS at 17:52

## 2024-08-14 NOTE — CONSULTS
[facility] CARDIOLOGY CONSULT  [facility]      Cardiology assessment and plan    Asymptomatic sinus bradycardia and  Respiratory failure pneumonia sepsis septic shock  Hyperlipidemia  Hypothyroidism/normal TSH  History of polio and bedridden status  Tmax is 98.2 pulse is 42-62 respirations are 12 blood pressure is 130/60 sats are 98%  Sodium is 136 potassium is 4.3 creatinine is 0.3 normal thyroid function hemoglobin is 11.4  Telemetry sinus bradycardia with no significant cardiac arrhythmia  No clear indication for pacemaker placement  Considering patient frail status and asymptomatic status plan to treat patient conservatively at this time  Avoid AV ralph blocking medication  If there is still a clinical concern we can consider extended Holter monitor study  Further recommendations based on patient course                  Subjective:     Encounter Date:08/11/2024      Patient ID: Karina Saleem is a 84 y.o. female.    Chief Complaint: consult for bradycardia      HPI:  Karina Saleem is a 84 y.o. female unknown to us and without prior cardiac history.  PMH includes HLD, hypothyroidism, polio, and anxiety.  She presented from the nursing home with respiratory failure and found with pneumonia complicated by septic shock.  She developed bradycardia and cardiology was consulted for further evaluation and recommendations.    Patient is wheelchair-bound due to complications of polio.  She tells me she saw a cardiologist in [place] decades ago but cannot recall why.  She states testing was unrevealing and seems to describe having an echo done.  Somehow she was given a prescription for sublingual nitroglycerin.  I have described a heart cath procedure but she denies ever having this type of test performed.  She has on occasion taken a nitroglycerin for chest pain but is not taken 1 in 2 years.  She is on nasal cannula currently and denies any shortness of breath.  She denies  any periods of weakness or dizziness.  She denies any recent chest pain.  She has no history of sleep apnea and has never been told that she snores or stops breathing in her sleep.  Telemetry shows sinus rhythm and sinus bradycardia down to 40s with PACs and insignificant pauses up to 1.6 seconds.  This is not just nocturnally.  She is off pressors.      Past Medical History:   Diagnosis Date    Anxiety     Cataracts, bilateral     Femur fracture, right     Hypothyroidism     Insomnia     Kyphoscoliosis     MAMMO     NEG = 2019    Osteoporosis     Paraplegic immobility syndrome     Peripheral neuropathy     Polio osteopathy of lower leg, left     Polio osteopathy of lower leg, right     Scoliosis          Past Surgical History:   Procedure Laterality Date    APPENDECTOMY      LIPOMA EXCISION Bilateral     breast    TOTAL ABDOMINAL HYSTERECTOMY           Social History     Socioeconomic History    Marital status: Single   Tobacco Use    Smoking status: Never     Passive exposure: Never    Smokeless tobacco: Never   Vaping Use    Vaping status: Never Used   Substance and Sexual Activity    Alcohol use: No    Drug use: No    Sexual activity: Defer       Family History   Problem Relation Age of Onset    Diabetes Mother     Heart disease Mother         CHF    Heart failure Father     Heart disease Father     COPD Father         BLACK LUNG    Lymphoma Sister     Lung cancer Brother     Pancreatic cancer Brother     Bone cancer Brother     Cancer Brother      Noncontributory      Allergies   Allergen Reactions    Latex Rash    Ceftazidime Unknown - High Severity and Other (See Comments)    Furosemide Hives, Unknown (See Comments) and Unknown - Low Severity    Pantoprazole Nausea And Vomiting and Unknown - Low Severity    Pantoprazole Sodium Nausea And Vomiting    Tizanidine Dizziness and Other (See Comments)       Current Medications:   Scheduled Meds:fentaNYL, 1 patch, Transdermal, Q72H   And  Check Fentanyl Patch  "Placement, 1 each, Does not apply, Q12H  enoxaparin, 40 mg, Subcutaneous, Q24H  gabapentin, 400 mg, Oral, TID  levothyroxine, 137 mcg, Oral, Q AM  piperacillin-tazobactam, 3.375 g, Intravenous, Q8H  senna, 1 tablet, Oral, Nightly      Continuous Infusions:     Review of Systems   Constitutional: Negative for chills, decreased appetite and malaise/fatigue.   HENT:  Negative for congestion and nosebleeds.    Eyes:  Negative for blurred vision and double vision.   Cardiovascular:  Negative for chest pain, dyspnea on exertion, irregular heartbeat, leg swelling, near-syncope and orthopnea.   Respiratory:  Negative for cough and shortness of breath.    Hematologic/Lymphatic: Negative for adenopathy. Does not bruise/bleed easily.   Skin:  Negative for color change and rash.   Musculoskeletal:  Negative for back pain and joint pain.   Gastrointestinal:  Negative for bloating, abdominal pain, hematemesis and hematochezia.   Genitourinary:  Negative for flank pain and hematuria.   Neurological:  Negative for dizziness and focal weakness.   Psychiatric/Behavioral:  Negative for altered mental status. The patient does not have insomnia.      All other systems reviewed and are negative.       Objective:         /64 (BP Location: Left arm, Patient Position: Lying)   Pulse (!) 42   Temp 98 °F (36.7 °C) (Oral)   Resp 13   Ht 160 cm (63\")   Wt 63.8 kg (140 lb 10.5 oz)   SpO2 98%   BMI 24.92 kg/m²       General: Frail in NAD.  Neuro: AAOx3. No gross deficits.  HEENT: Sclerae clear, no xanthelasmas.  CV: Slow S1S2, RRR. No murmurs or gallops.  Resp: Breathing is unlabored. Lungs CTA throughout.  GI: BS+. Abdomen soft and NTTP.  Ext: Pedal pulses are palpable. Extremities are with non-pitting BLE edema.  MS: moves upper extremities, contractures of the bilateral feet.  Skin: warm, dry.  Psych: calm and cooperative.            Lab Review:     Results from last 7 days   Lab Units 08/14/24  0301 08/13/24  0504 08/12/24  0614 " "08/11/24  1257   SODIUM mmol/L 136 135*   < > 130*   POTASSIUM mmol/L 4.3 3.9   < > 4.5   CHLORIDE mmol/L 104 104   < > 98   CO2 mmol/L 24.0 25.7   < > 20.2*   BUN mg/dL 12 11   < > 13   CREATININE mg/dL 0.30* 0.27*   < > 0.36*   GLUCOSE mg/dL 99 121*   < > 116*   CALCIUM mg/dL 8.5* 8.4*   < > 8.7   AST (SGOT) U/L  --   --   --  19   ALT (SGPT) U/L  --   --   --  6    < > = values in this interval not displayed.     Results from last 7 days   Lab Units 08/11/24  1440 08/11/24  1257   HSTROP T ng/L 18* 16*     Results from last 7 days   Lab Units 08/14/24  0301 08/13/24  0504   WBC 10*3/mm3 12.04* 14.45*   HEMOGLOBIN g/dL 11.4* 10.8*   HEMATOCRIT % 37.4 34.5   PLATELETS 10*3/mm3 198 187     Results from last 7 days   Lab Units 08/11/24  1430   INR  1.01   APTT seconds 29.4*     Results from last 7 days   Lab Units 08/14/24  0301 08/13/24  0504   MAGNESIUM mg/dL 2.0 1.9           Invalid input(s): \"LDLCALC\"  Results from last 7 days   Lab Units 08/11/24  1257   PROBNP pg/mL 243.0           Recent Radiology:  Imaging Results (Most Recent)       Procedure Component Value Units Date/Time    XR Chest 1 View [[id number]] Collected: 08/12/24 1122     Updated: 08/12/24 1127    Narrative:      XR CHEST 1 VW    Date of Exam: 8/12/2024 10:42 AM EDT    Indication: Pneumonia    Comparison: August 11, 2024    Findings:  There is elevation of the right hemidiaphragm. There are bibasilar opacities. The heart and mediastinal contours appear stable. The pulmonary vasculature appears normal. A right-sided PICC has its tip at the upper SVC. There is dextroscoliosis of the   midthoracic spine. There are degenerative changes along the left shoulder.      Impression:      Impression:  Bibasilar opacities similar to prior exam, which could reflect atelectasis or pneumonia.      Electronically Signed: Bon Mcmillan MD    8/12/2024 11:25 AM EDT    Workstation ID: [id number]    XR Chest 1 View [[id number]] Collected: 08/11/24 1313     Updated: " 08/11/24 1317    Narrative:      XR CHEST 1 VW    Date of Exam: 8/11/2024 12:59 PM EDT    Indication: soa    Comparison: 6/15/2024    Findings:  Lines: None    Lungs: Chronic elevation of the right hemidiaphragm. Patchy opacities noted in the left mid to lower lung, predominantly in the perihilar region. The upper lungs are relatively clear.  Pleura: No pleural effusion or pneumothorax.    Cardiomediastinum: Unchanged    Soft Tissues: Unremarkable.    Bones: No acute osseous abnormality. Moderate to severe S-shaped scoliosis of the thoracolumbar spine.      Impression:      Impression:  Findings concerning for pneumonia/pneumonitis involving the left mid to lower lung.    Additional chronic findings as characterized above      Electronically Signed: Nestor Rosen DO    8/11/2024 1:15 PM EDT    Workstation ID: [id number]              ECHOCARDIOGRAM:              Assessment:         Active Hospital Problems    Diagnosis  POA    **Pneumonia [J18.9]  Yes    Generalized muscle weakness [M62.81]  Yes    Iron deficiency anemia [D50.9]  Yes    Hypothyroidism [E03.9]  Yes     1) Asymptomatic Bradycardia  - Initial EKG on admit shows ST in setting sepsis  - K 4.3, Mg 2.0  - not on AV ralph blockers  - not exclusively nocturnal    2) Respiratory Failure / PNA complicated by septic shock  - off pressors    3) HLD    4) hypothyroidism    5) hx polio           Plan:   Check EKG, TSH, and 2D echo.  Tele shows sinus bradycardia down to 40s with PACs and insignificant pauses up to 1.6 seconds but no high grade AV block.  Patient is asymptomatic.  Not on AV ralph blockers.  Further recommendations per attending cardiologist.         Electronically signed by MICHEAL Clark, 08/14/24, 2:19 PM EDT.

## 2024-08-14 NOTE — PROGRESS NOTES
[facility] MEDICINE SERVICE  DAILY PROGRESS NOTE    NAME: Karina Saleem  : 1940  MRN: [id number]      LOS: 3 days     PROVIDER OF SERVICE: Geovanni Benjamin MD    Chief Complaint: Pneumonia    Subjective:     Interval History:  History taken from: patient chart RN  Breathing stable no CP    Review of Systems:   Review of Systems  All negative except as above   Objective:     Vital Signs  Temp:  [97.4 °F (36.3 °C)-98.4 °F (36.9 °C)] 98 °F (36.7 °C)  Heart Rate:  [41-62] 42  Resp:  [12-16] 13  BP: (107-162)/(45-73) 161/64  Flow (L/min):  [2] 2   Body mass index is 24.92 kg/m².    Physical Exam  Physical Exam  NAD  Frail elderly female  RRR S1-S2 audible  Multiparity  Abdomen soft nontender nondistended  Scheduled Meds   fentaNYL, 1 patch, Transdermal, Q72H   And  Check Fentanyl Patch Placement, 1 each, Does not apply, Q12H  enoxaparin, 40 mg, Subcutaneous, Q24H  gabapentin, 400 mg, Oral, TID  levothyroxine, 137 mcg, Oral, Q AM  piperacillin-tazobactam, 3.375 g, Intravenous, Q8H  senna, 1 tablet, Oral, Nightly       PRN Meds     acetaminophen **OR** acetaminophen **OR** acetaminophen    senna-docusate sodium **AND** polyethylene glycol **AND** bisacodyl **AND** bisacodyl    Calcium Replacement - Follow Nurse / BPA Driven Protocol    diazePAM    Magnesium Standard Dose Replacement - Follow Nurse / BPA Driven Protocol    melatonin    ondansetron ODT **OR** ondansetron    oxyCODONE    Phosphorus Replacement - Follow Nurse / BPA Driven Protocol    Potassium Replacement - Follow Nurse / BPA Driven Protocol    [COMPLETED] Insert Peripheral IV **AND** sodium chloride    zolpidem   Infusions         Diagnostic Data    Results from last 7 days   Lab Units 24  0301 24  0536 24  1257   WBC 10*3/mm3 12.04*   < > 15.02*   HEMOGLOBIN g/dL 11.4*   < > 13.4   HEMATOCRIT % 37.4   < > 44.2   PLATELETS 10*3/mm3 198   < > 200   GLUCOSE mg/dL 99   < > 116*   CREATININE mg/dL 0.30*   < > 0.36*   BUN  mg/dL 12   < > 13   SODIUM mmol/L 136   < > 130*   POTASSIUM mmol/L 4.3   < > 4.5   AST (SGOT) U/L  --   --  19   ALT (SGPT) U/L  --   --  6   ALK PHOS U/L  --   --  80   BILIRUBIN mg/dL  --   --  0.4   ANION GAP mmol/L 8.0   < > 11.8    < > = values in this interval not displayed.       No radiology results for the last day      I reviewed the patient's new clinical results.  I reviewed the patient's new imaging results and agree with the interpretation.    Assessment/Plan:     Active and Resolved Problems  Active Hospital Problems    Diagnosis  POA    **Pneumonia [J18.9]  Yes    Generalized muscle weakness [M62.81]  Yes    Iron deficiency anemia [D50.9]  Yes    Hypothyroidism [E03.9]  Yes      Resolved Hospital Problems   No resolved problems to display.       83-year-old female with history of anxiety, hypothyroidism, poorly admitted to [facility] 8/11 with fluctuating heart rate found to be hypoxic and hypotensive transferred to ICU now off vasopressors    #Acute hypoxic respiratory failure due to pneumonia/pneumonitis   #Septic shock resolved  Continue Zosyn.  MRSA swab negative  Follow infectious workup  Continue supplemental oxygen as needed to keep SpO2 more than 92 wean off as tolerated  DC midodrine monitor blood pressure    #Sinus Bradycardia   Unfortunately patient is on multiple medications which can worsen bradycardia  Consult EP   Discontinue midodrine   Avoid AV ralph blocking agents  Tele monitoring     #Chronic Pain syndrome   On fentanyl patch home dose   Decrease po oxycodone pRN dose     #Anxiety   On valium home dose     #Hypothyroidism  Continue home dose of levothyroxine  Follow TSH     #History of polio  Wheelchair-bound at baseline      VTE Prophylaxis:  Pharmacologic VTE prophylaxis orders are present.         Code status is   Code Status and Medical Interventions: No CPR (Do Not Attempt to Resuscitate); Limited Support; No intubation (DNI)   Ordered at: 08/12/24 2676     Medical  Intervention Limits:    No intubation (DNI)     Level Of Support Discussed With:    Patient     Code Status (Patient has no pulse and is not breathing):    No CPR (Do Not Attempt to Resuscitate)     Medical Interventions (Patient has pulse or is breathing):    Limited Support       Plan for disposition: 1 to 2 days     Time: 30 minutes    Signature: Electronically signed by Geovanni Benjamin MD, 08/14/24, 13:51 EDT.  [place] Hospitalist Team

## 2024-08-14 NOTE — NURSING NOTE
Patient refusing EKG at this time. Patient educated on why the test was important but patient continues to refuse. Dr. Rojas and Radah Dupree informed of patients refusal.

## 2024-08-14 NOTE — PLAN OF CARE
Problem: Adult Inpatient Plan of Care  Goal: Plan of Care Review  Outcome: Ongoing, Progressing  Flowsheets (Taken 8/14/2024 1646)  Progress: no change  Plan of Care Reviewed With: patient  Outcome Evaluation: Pt. voices no new complaints this shift. Patient remains bradycardic with new cardiology consult today. BP stable, midodrine on hold per hospitalist orders. Will continue to monitor closely.  Goal: Patient-Specific Goal (Individualized)  Outcome: Ongoing, Progressing  Goal: Absence of Hospital-Acquired Illness or Injury  Outcome: Ongoing, Progressing  Intervention: Identify and Manage Fall Risk  Recent Flowsheet Documentation  Taken 8/14/2024 1600 by Michelle Stark RN  Safety Promotion/Fall Prevention:   safety round/check completed   fall prevention program maintained   assistive device/personal items within reach  Taken 8/14/2024 1500 by Michelle Stark RN  Safety Promotion/Fall Prevention:   safety round/check completed   fall prevention program maintained   assistive device/personal items within reach  Taken 8/14/2024 1408 by Michelle Stark RN  Safety Promotion/Fall Prevention:   safety round/check completed   fall prevention program maintained   assistive device/personal items within reach  Taken 8/14/2024 1300 by Michelle Stark RN  Safety Promotion/Fall Prevention: safety round/check completed  Taken 8/14/2024 1200 by Michelle Stark RN  Safety Promotion/Fall Prevention:   assistive device/personal items within reach   clutter free environment maintained   safety round/check completed   fall prevention program maintained  Taken 8/14/2024 1100 by Michelle Stark RN  Safety Promotion/Fall Prevention: safety round/check completed  Taken 8/14/2024 1000 by Michelle Stark RN  Safety Promotion/Fall Prevention:   clutter free environment maintained   assistive device/personal items within reach   safety round/check completed   fall prevention program maintained  Taken 8/14/2024  0800 by Michelle Stark RN  Safety Promotion/Fall Prevention:   clutter free environment maintained   assistive device/personal items within reach   safety round/check completed   fall prevention program maintained  Taken 8/14/2024 0700 by Michelle Stark RN  Safety Promotion/Fall Prevention:   safety round/check completed   fall prevention program maintained  Intervention: Prevent Skin Injury  Recent Flowsheet Documentation  Taken 8/14/2024 1600 by Michelle Stark RN  Body Position: patient/family refused  Taken 8/14/2024 1408 by Michelle Stark RN  Body Position:   turned   head facing, right  Taken 8/14/2024 0700 by Michelle Stark RN  Body Position:   right   left  Intervention: Prevent and Manage VTE (Venous Thromboembolism) Risk  Recent Flowsheet Documentation  Taken 8/14/2024 0900 by Michelle Stark RN  Activity Management:   activity minimized   activity encouraged   unable to complete activity (see comments)  Taken 8/14/2024 0800 by Michelle Stark RN  Activity Management: activity encouraged  Range of Motion: ROM (range of motion) performed  Goal: Optimal Comfort and Wellbeing  Outcome: Ongoing, Progressing  Intervention: Provide Person-Centered Care  Recent Flowsheet Documentation  Taken 8/14/2024 1600 by Michelle Stark RN  Trust Relationship/Rapport:   care explained   questions answered   questions encouraged  Taken 8/14/2024 1200 by Michelle Stark RN  Trust Relationship/Rapport:   care explained   questions answered   questions encouraged  Taken 8/14/2024 0800 by Michelle Stark RN  Trust Relationship/Rapport:   care explained   thoughts/feelings acknowledged  Goal: Readiness for Transition of Care  Outcome: Ongoing, Progressing     Problem: Skin Injury Risk Increased  Goal: Skin Health and Integrity  Outcome: Ongoing, Progressing  Intervention: Optimize Skin Protection  Recent Flowsheet Documentation  Taken 8/14/2024 1600 by Michelle Stark RN  Head of Bed  (HOB) Positioning: HOB at 20-30 degrees  Taken 8/14/2024 1408 by Michelle Stark RN  Head of Bed (HOB) Positioning: HOB at 20-30 degrees  Taken 8/14/2024 0800 by Michelle Stark RN  Pressure Reduction Techniques:   weight shift assistance provided   sit time limited to 2 hours  Head of Bed (HOB) Positioning: HOB at 30-45 degrees  Pressure Reduction Devices:   positioning supports utilized   elbow protectors utilized  Taken 8/14/2024 0700 by Michelle Stark RN  Head of Bed (HOB) Positioning: HOB at 20-30 degrees  Goal: Skin Health and Integrity  Outcome: Ongoing, Progressing  Intervention: Optimize Skin Protection  Recent Flowsheet Documentation  Taken 8/14/2024 1600 by Michelle Stark RN  Head of Bed (HOB) Positioning: HOB at 20-30 degrees  Taken 8/14/2024 1408 by Michelle Stark RN  Head of Bed (HOB) Positioning: HOB at 20-30 degrees  Taken 8/14/2024 0800 by Michelle Stark RN  Pressure Reduction Techniques:   weight shift assistance provided   sit time limited to 2 hours  Head of Bed (HOB) Positioning: HOB at 30-45 degrees  Pressure Reduction Devices:   positioning supports utilized   elbow protectors utilized  Taken 8/14/2024 0700 by Michelle Stark RN  Head of Bed (HOB) Positioning: HOB at 20-30 degrees     Problem: Fall Injury Risk  Goal: Absence of Fall and Fall-Related Injury  Outcome: Ongoing, Progressing  Intervention: Promote Injury-Free Environment  Recent Flowsheet Documentation  Taken 8/14/2024 1600 by Michelle Stark RN  Safety Promotion/Fall Prevention:   safety round/check completed   fall prevention program maintained   assistive device/personal items within reach  Taken 8/14/2024 1500 by Michelle Stark RN  Safety Promotion/Fall Prevention:   safety round/check completed   fall prevention program maintained   assistive device/personal items within reach  Taken 8/14/2024 1408 by Michelle Stark RN  Safety Promotion/Fall Prevention:   safety round/check  completed   fall prevention program maintained   assistive device/personal items within reach  Taken 8/14/2024 1300 by Michelle Stark RN  Safety Promotion/Fall Prevention: safety round/check completed  Taken 8/14/2024 1200 by Michelle Stark RN  Safety Promotion/Fall Prevention:   assistive device/personal items within reach   clutter free environment maintained   safety round/check completed   fall prevention program maintained  Taken 8/14/2024 1100 by Michelle Stark RN  Safety Promotion/Fall Prevention: safety round/check completed  Taken 8/14/2024 1000 by Michelle Stark RN  Safety Promotion/Fall Prevention:   clutter free environment maintained   assistive device/personal items within reach   safety round/check completed   fall prevention program maintained  Taken 8/14/2024 0800 by Michelle Stark RN  Safety Promotion/Fall Prevention:   clutter free environment maintained   assistive device/personal items within reach   safety round/check completed   fall prevention program maintained  Taken 8/14/2024 0700 by Michelle Stark RN  Safety Promotion/Fall Prevention:   safety round/check completed   fall prevention program maintained     Problem: Pain Acute  Goal: Acceptable Pain Control and Functional Ability  Outcome: Ongoing, Progressing  Intervention: Optimize Psychosocial Wellbeing  Recent Flowsheet Documentation  Taken 8/14/2024 0800 by Michelle Stark RN  Diversional Activities: television     Problem: Adjustment to Illness (Sepsis/Septic Shock)  Goal: Optimal Coping  Outcome: Ongoing, Progressing     Problem: Bleeding (Sepsis/Septic Shock)  Goal: Absence of Bleeding  Outcome: Ongoing, Progressing     Problem: Glycemic Control Impaired (Sepsis/Septic Shock)  Goal: Blood Glucose Level Within Desired Range  Outcome: Ongoing, Progressing     Problem: Infection Progression (Sepsis/Septic Shock)  Goal: Absence of Infection Signs and Symptoms  Outcome: Ongoing,  Progressing  Intervention: Promote Recovery  Recent Flowsheet Documentation  Taken 8/14/2024 0900 by Michelle Stark, RN  Activity Management:   activity minimized   activity encouraged   unable to complete activity (see comments)  Taken 8/14/2024 0800 by Michelle Stark, RN  Activity Management: activity encouraged     Problem: Nutrition Impaired (Sepsis/Septic Shock)  Goal: Optimal Nutrition Intake  Outcome: Ongoing, Progressing   Goal Outcome Evaluation:  Plan of Care Reviewed With: patient        Progress: no change  Outcome Evaluation: Pt. voices no new complaints this shift. Patient remains bradycardic with new cardiology consult today. BP stable, midodrine on hold per hospitalist orders. Will continue to monitor closely.

## 2024-08-14 NOTE — CASE MANAGEMENT/SOCIAL WORK
Continued Stay Note  [facility]     Patient Name: Karina Saleem  MRN: [id number]  Today's Date: 8/14/2024    Admit Date: 8/11/2024    Plan: Plan to return to [facility], pending bed availability (no bed hold). No Precert required/ No new PASRR required.   Discharge Plan       Row Name 08/14/24 1019       Plan    Plan Plan to return to [facility], pending bed availability (no bed hold). No Precert required/ No new PASRR required.    Plan Comments DC Barriers: PICC, IV Abxs, 2L NC, cardiac and b/p monitoring.                 Expected Discharge Date and Time       Expected Discharge Date Expected Discharge Time    Aug 14, 2024               STANFORD Baeza RN  SIPS/ICU   O: [phone]  C: [phone]  [email or web address]

## 2024-08-14 NOTE — NURSING NOTE
Dr. Benjamin made aware of patients bradycardia this morning. New orders for EP-cardiology consult and discontinue midodrine. Dr. Benjamin instructed this nurse to monitor patients BP closely.

## 2024-08-15 ENCOUNTER — TELEPHONE (OUTPATIENT)
Dept: CARDIOLOGY | Facility: CLINIC | Age: 84
End: 2024-08-15

## 2024-08-15 ENCOUNTER — APPOINTMENT (OUTPATIENT)
Dept: CARDIOLOGY | Facility: HOSPITAL | Age: 84
DRG: 871 | End: 2024-08-15
Payer: MEDICARE

## 2024-08-15 LAB
ANION GAP SERPL CALCULATED.3IONS-SCNC: 6.5 MMOL/L (ref 5–15)
AORTIC DIMENSIONLESS INDEX: 0.72 (DI)
BASOPHILS # BLD AUTO: 0.04 10*3/MM3 (ref 0–0.2)
BASOPHILS NFR BLD AUTO: 0.6 % (ref 0–1.5)
BH CV ECHO MEAS - ACS: 1.8 CM
BH CV ECHO MEAS - AO MAX PG: 4.6 MMHG
BH CV ECHO MEAS - AO MEAN PG: 2 MMHG
BH CV ECHO MEAS - AO V2 MAX: 107 CM/SEC
BH CV ECHO MEAS - AO V2 VTI: 24 CM
BH CV ECHO MEAS - AVA(I,D): 2.06 CM2
BH CV ECHO MEAS - EDV(CUBED): 79.5 ML
BH CV ECHO MEAS - EDV(MOD-SP4): 56 ML
BH CV ECHO MEAS - EF(MOD-SP4): 76.8 %
BH CV ECHO MEAS - ESV(CUBED): 24.4 ML
BH CV ECHO MEAS - ESV(MOD-SP4): 13 ML
BH CV ECHO MEAS - FS: 32.6 %
BH CV ECHO MEAS - IVS/LVPW: 0.89 CM
BH CV ECHO MEAS - IVSD: 0.8 CM
BH CV ECHO MEAS - LAT PEAK E' VEL: 8.6 CM/SEC
BH CV ECHO MEAS - LV MASS(C)D: 114.2 GRAMS
BH CV ECHO MEAS - LV MAX PG: 2.4 MMHG
BH CV ECHO MEAS - LV MEAN PG: 1 MMHG
BH CV ECHO MEAS - LV V1 MAX: 77.4 CM/SEC
BH CV ECHO MEAS - LV V1 VTI: 19.4 CM
BH CV ECHO MEAS - LVIDD: 4.3 CM
BH CV ECHO MEAS - LVIDS: 2.9 CM
BH CV ECHO MEAS - LVOT AREA: 2.5 CM2
BH CV ECHO MEAS - LVOT DIAM: 1.8 CM
BH CV ECHO MEAS - LVPWD: 0.9 CM
BH CV ECHO MEAS - MED PEAK E' VEL: 10.9 CM/SEC
BH CV ECHO MEAS - MR MAX PG: 67.9 MMHG
BH CV ECHO MEAS - MR MAX VEL: 412 CM/SEC
BH CV ECHO MEAS - MV A MAX VEL: 79.7 CM/SEC
BH CV ECHO MEAS - MV DEC SLOPE: 497.5 CM/SEC2
BH CV ECHO MEAS - MV DEC TIME: 0.28 SEC
BH CV ECHO MEAS - MV E MAX VEL: 135 CM/SEC
BH CV ECHO MEAS - MV E/A: 1.69
BH CV ECHO MEAS - MV MAX PG: 6.6 MMHG
BH CV ECHO MEAS - MV MEAN PG: 2 MMHG
BH CV ECHO MEAS - MV P1/2T: 81.2 MSEC
BH CV ECHO MEAS - MV V2 VTI: 36.8 CM
BH CV ECHO MEAS - MVA(P1/2T): 2.7 CM2
BH CV ECHO MEAS - MVA(VTI): 1.34 CM2
BH CV ECHO MEAS - PA V2 MAX: 73.3 CM/SEC
BH CV ECHO MEAS - QP/QS: 0.53
BH CV ECHO MEAS - RV MAX PG: 1.21 MMHG
BH CV ECHO MEAS - RV V1 MAX: 55.1 CM/SEC
BH CV ECHO MEAS - RV V1 VTI: 14.8 CM
BH CV ECHO MEAS - RVDD: 2.2 CM
BH CV ECHO MEAS - RVOT DIAM: 1.5 CM
BH CV ECHO MEAS - SV(LVOT): 49.4 ML
BH CV ECHO MEAS - SV(MOD-SP4): 43 ML
BH CV ECHO MEAS - SV(RVOT): 26.2 ML
BH CV ECHO MEAS - TAPSE (>1.6): 2.6 CM
BH CV ECHO MEAS - TR MAX PG: 26.6 MMHG
BH CV ECHO MEAS - TR MAX VEL: 258 CM/SEC
BH CV ECHO MEASUREMENTS AVERAGE E/E' RATIO: 13.85
BH CV XLRA - TDI S': 18.2 CM/SEC
BUN SERPL-MCNC: 9 MG/DL (ref 8–23)
BUN/CREAT SERPL: 29 (ref 7–25)
CALCIUM SPEC-SCNC: 8.2 MG/DL (ref 8.6–10.5)
CHLORIDE SERPL-SCNC: 105 MMOL/L (ref 98–107)
CO2 SERPL-SCNC: 27.5 MMOL/L (ref 22–29)
CREAT SERPL-MCNC: 0.31 MG/DL (ref 0.57–1)
DEPRECATED RDW RBC AUTO: 46.7 FL (ref 37–54)
EGFRCR SERPLBLD CKD-EPI 2021: 102.6 ML/MIN/1.73
EOSINOPHIL # BLD AUTO: 0.31 10*3/MM3 (ref 0–0.4)
EOSINOPHIL NFR BLD AUTO: 4.7 % (ref 0.3–6.2)
ERYTHROCYTE [DISTWIDTH] IN BLOOD BY AUTOMATED COUNT: 13.5 % (ref 12.3–15.4)
GLUCOSE SERPL-MCNC: 90 MG/DL (ref 65–99)
HCT VFR BLD AUTO: 33.1 % (ref 34–46.6)
HGB BLD-MCNC: 10.3 G/DL (ref 12–15.9)
IMM GRANULOCYTES # BLD AUTO: 0.04 10*3/MM3 (ref 0–0.05)
IMM GRANULOCYTES NFR BLD AUTO: 0.6 % (ref 0–0.5)
LYMPHOCYTES # BLD AUTO: 1.39 10*3/MM3 (ref 0.7–3.1)
LYMPHOCYTES NFR BLD AUTO: 21.2 % (ref 19.6–45.3)
MCH RBC QN AUTO: 29.3 PG (ref 26.6–33)
MCHC RBC AUTO-ENTMCNC: 31.1 G/DL (ref 31.5–35.7)
MCV RBC AUTO: 94.3 FL (ref 79–97)
MONOCYTES # BLD AUTO: 0.51 10*3/MM3 (ref 0.1–0.9)
MONOCYTES NFR BLD AUTO: 7.8 % (ref 5–12)
NEUTROPHILS NFR BLD AUTO: 4.26 10*3/MM3 (ref 1.7–7)
NEUTROPHILS NFR BLD AUTO: 65.1 % (ref 42.7–76)
NRBC BLD AUTO-RTO: 0 /100 WBC (ref 0–0.2)
PLATELET # BLD AUTO: 158 10*3/MM3 (ref 140–450)
PMV BLD AUTO: 10.7 FL (ref 6–12)
POTASSIUM SERPL-SCNC: 4 MMOL/L (ref 3.5–5.2)
RBC # BLD AUTO: 3.51 10*6/MM3 (ref 3.77–5.28)
SINUS: 2.7 CM
SODIUM SERPL-SCNC: 139 MMOL/L (ref 136–145)
STJ: 2.2 CM
WBC NRBC COR # BLD AUTO: 6.55 10*3/MM3 (ref 3.4–10.8)

## 2024-08-15 PROCEDURE — 93306 TTE W/DOPPLER COMPLETE: CPT

## 2024-08-15 PROCEDURE — 25010000002 PIPERACILLIN SOD-TAZOBACTAM PER 1 G

## 2024-08-15 PROCEDURE — 93306 TTE W/DOPPLER COMPLETE: CPT | Performed by: INTERNAL MEDICINE

## 2024-08-15 PROCEDURE — 25010000002 ENOXAPARIN PER 10 MG: Performed by: INTERNAL MEDICINE

## 2024-08-15 PROCEDURE — 85025 COMPLETE CBC W/AUTO DIFF WBC: CPT | Performed by: NURSE PRACTITIONER

## 2024-08-15 PROCEDURE — 80048 BASIC METABOLIC PNL TOTAL CA: CPT | Performed by: NURSE PRACTITIONER

## 2024-08-15 PROCEDURE — 99233 SBSQ HOSP IP/OBS HIGH 50: CPT | Performed by: INTERNAL MEDICINE

## 2024-08-15 RX ADMIN — OXYCODONE HYDROCHLORIDE 5 MG: 5 TABLET ORAL at 09:27

## 2024-08-15 RX ADMIN — PIPERACILLIN AND TAZOBACTAM 3.38 G: 3; .375 INJECTION, POWDER, FOR SOLUTION INTRAVENOUS at 01:40

## 2024-08-15 RX ADMIN — GABAPENTIN 400 MG: 400 CAPSULE ORAL at 20:55

## 2024-08-15 RX ADMIN — PIPERACILLIN AND TAZOBACTAM 3.38 G: 3; .375 INJECTION, POWDER, FOR SOLUTION INTRAVENOUS at 17:38

## 2024-08-15 RX ADMIN — LEVOTHYROXINE SODIUM 137 MCG: 0.11 TABLET ORAL at 05:32

## 2024-08-15 RX ADMIN — OXYCODONE HYDROCHLORIDE 5 MG: 5 TABLET ORAL at 01:37

## 2024-08-15 RX ADMIN — ENOXAPARIN SODIUM 40 MG: 100 INJECTION SUBCUTANEOUS at 15:57

## 2024-08-15 RX ADMIN — OXYCODONE HYDROCHLORIDE 5 MG: 5 TABLET ORAL at 21:02

## 2024-08-15 RX ADMIN — SENNOSIDES 1 TABLET: 8.6 TABLET, FILM COATED ORAL at 20:55

## 2024-08-15 RX ADMIN — FENTANYL 1 PATCH: 100 PATCH TRANSDERMAL at 10:43

## 2024-08-15 RX ADMIN — OXYCODONE HYDROCHLORIDE 5 MG: 5 TABLET ORAL at 05:32

## 2024-08-15 RX ADMIN — OXYCODONE HYDROCHLORIDE 5 MG: 5 TABLET ORAL at 15:58

## 2024-08-15 RX ADMIN — GABAPENTIN 400 MG: 400 CAPSULE ORAL at 09:24

## 2024-08-15 RX ADMIN — PIPERACILLIN AND TAZOBACTAM 3.38 G: 3; .375 INJECTION, POWDER, FOR SOLUTION INTRAVENOUS at 09:24

## 2024-08-15 RX ADMIN — GABAPENTIN 400 MG: 400 CAPSULE ORAL at 15:57

## 2024-08-15 NOTE — TELEPHONE ENCOUNTER
Caller: DEEDEE GUEVARA    Relationship to patient: Self    Best call back number: PATIENT @ DEEDEE GUEVARA     Chief complaint: NA    Type of visit: HFU    Requested date: 7-14 DAYS     If rescheduling, when is the original appointment: NA     Additional notes: DEEDEE GUEVARA CALLING TO SCHEDULE 7-14 DAY FU FOR PT - NO TIMEFRAME SHOWING IN DISCHARGE - SHE IS ASKING THAT THE PT BE CONTACTED TO SCHEDULE

## 2024-08-15 NOTE — CASE MANAGEMENT/SOCIAL WORK
Continued Stay Note   [facility]     Patient Name: Karina Saleem  MRN: [id number]  Today's Date: 8/15/2024    Admit Date: 8/11/2024    Plan: Plan to dc to [facility] or [facility] ([place] for her to return). No Precert required. No new PASRR required.   Discharge Plan       Row Name 08/15/24 1435       Plan    Plan Plan to dc to [facility] or [facility] ([place] [place] bed available for her to return). No Precert required. No new PASRR required.    Plan Comments CM met with patient and spoke with Rosie (friend - [facility]), agreeable to [facility] or [facility], SNF, referral in [facility], liaison notified, pending acceptance.  DC Barriers:  cardiac and b/p monitoring overnight, plan to dc 8/16, 2L NC, EP following.               Expected Discharge Date and Time       Expected Discharge Date Expected Discharge Time    Aug 16, 2024               STANFORD Baeza RN  SIPS/ICU   O: [phone]  C: [phone]  [email or web address]

## 2024-08-15 NOTE — PROGRESS NOTES
[facility]iology [facility]      Patient Care Team:  Edgar Barriga MD as PCP - General (Hospitalist)  Fahad Crouch MD (Orthopedic Surgery)           Cardiology assessment and plan     Asymptomatic sinus bradycardia   Heart rate has improved  Respiratory failure pneumonia sepsis septic shock  Hyperlipidemia  Hypothyroidism/normal TSH  History of polio and bedridden status  Normal LV systolic function    Denies any new cardiac symptoms  Heart rate is improved compared to yesterday  Tmax is 97.8 pulse is 62 respirations are 15 blood pressure is 112/58 sats are 96%  Sodium is 139 potassium is 4.0 creatinine is 0.3 hemoglobin is 10.3  Telemetry sinus bradycardia with no significant cardiac arrhythmia  No clear indication for pacemaker placement  Considering patient frail status and asymptomatic status plan to treat patient conservatively at this time  Avoid AV ralph blocking medication  If there is still a clinical concern we can consider extended Holter monitor study  Further recommendations based on patient course                Chief Complaint: Bradycardia    Subjective no new complaints    Interval History: No significant change in the overall status    History taken from: patient    Review of Systems:  Review of Systems   Constitutional: Negative for chills, decreased appetite and malaise/fatigue.   HENT:  Negative for congestion and nosebleeds.    Eyes:  Negative for blurred vision and double vision.   Cardiovascular:  Negative for chest pain, dyspnea on exertion, irregular heartbeat, leg swelling, near-syncope, orthopnea, palpitations and paroxysmal nocturnal dyspnea.   Respiratory:  Positive for cough. Negative for shortness of breath.    Hematologic/Lymphatic: Negative for adenopathy. Does not bruise/bleed easily.   Skin:  Negative for color change and rash.   Musculoskeletal:  Negative for back pain and joint pain.   Gastrointestinal:  Negative for bloating, abdominal pain, hematemesis and hematochezia.  "  Genitourinary:  Negative for flank pain and hematuria.   Neurological:  Negative for dizziness and focal weakness.   Psychiatric/Behavioral:  Negative for altered mental status. The patient does not have insomnia.        Objective    Vital Signs  Visit Vitals  /57   Pulse 53   Temp 98 °F (36.7 °C) (Oral)   Resp 13   Ht 160 cm (63\")   Wt 63.5 kg (140 lb)   SpO2 96%   BMI 24.80 kg/m²     Oxygen Therapy  SpO2: 96 %  Pulse Oximetry Type: Continuous  Device (Oxygen Therapy): nasal cannula  Flow (L/min): 2  Flowsheet Rows      Flowsheet Row First Filed Value   Admission Height 160 cm (63\") Documented at 08/11/2024 1211   Admission Weight 54.4 kg (120 lb) Documented at 08/11/2024 1211          Intake & Output (last 3 days)         08/12 0701  08/13 0700 08/13 0701  08/14 0700 08/14 0701  08/15 0700 08/15 0701  08/16 0700    P.O. 195 590 240     I.V. (mL/kg) 2261 (35.4)       IV Piggyback        Total Intake(mL/kg) 2456 (38.5) 590 (9.2) 240 (3.8)     Urine (mL/kg/hr)  1 (0)      Total Output  1      Net +2456 +589 +240             Urine Unmeasured Occurrence 5 x  2 x           Lines, Drains & Airways       Active LDAs       Name Placement date Placement time Site Days    PICC Triple Lumen 08/12/24 Right Basilic 08/12/24  0850  Basilic  3    Peripheral IV 08/11/24 1255 Anterior;Left Forearm 08/11/24  1255  Forearm  3    Peripheral IV Right;Lateral Forearm --  --  Forearm  --                    Physical Exam:  Constitutional:       Appearance: Well-developed.   Eyes:      Conjunctiva/sclera: Conjunctivae normal.      Pupils: Pupils are equal, round, and reactive to light.   HENT:      Head: Normocephalic and atraumatic.   Neck:      Thyroid: No thyromegaly.   Pulmonary:      Effort: Pulmonary effort is normal.      Breath sounds: Examination of the right-lower field reveals decreased breath sounds and rales. Examination of the left-lower field reveals decreased breath sounds and rales. Decreased breath sounds present. " Rales present.   Cardiovascular:      Bradycardia present. Regular rhythm.   Pulses:     Intact distal pulses.   Edema:     Peripheral edema absent.   Abdominal:      General: Bowel sounds are normal.      Palpations: Abdomen is soft.   Musculoskeletal:      Cervical back: Normal range of motion and neck supple. Skin:     General: Skin is warm.   Neurological:      Mental Status: Alert and oriented to person, place, and time.         Results Review:     I reviewed the patient's new clinical results.    Lab Results (last 24 hours)       Procedure Component Value Units Date/Time    Basic Metabolic Panel [[id number]]  (Abnormal) Collected: 08/15/24 0535    Specimen: Blood Updated: 08/15/24 0615     Glucose 90 mg/dL      BUN 9 mg/dL      Creatinine 0.31 mg/dL      Sodium 139 mmol/L      Potassium 4.0 mmol/L      Chloride 105 mmol/L      CO2 27.5 mmol/L      Calcium 8.2 mg/dL      BUN/Creatinine Ratio 29.0     Anion Gap 6.5 mmol/L      eGFR 102.6 mL/min/1.73     Narrative:      GFR Normal >60  Chronic Kidney Disease <60  Kidney Failure <15    The GFR formula is only valid for adults with stable renal function between ages 18 and 70.    CBC & Differential [[id number]]  (Abnormal) Collected: 08/15/24 0535    Specimen: Blood Updated: 08/15/24 0544    Narrative:      The following orders were created for panel order CBC & Differential.  Procedure                               Abnormality         Status                     ---------                               -----------         ------                     CBC Auto Differential[[id number]]        Abnormal            Final result                 Please view results for these tests on the individual orders.    CBC Auto Differential [[id number]]  (Abnormal) Collected: 08/15/24 0535    Specimen: Blood Updated: 08/15/24 0544     WBC 6.55 10*3/mm3      RBC 3.51 10*6/mm3      Hemoglobin 10.3 g/dL      Hematocrit 33.1 %      MCV 94.3 fL      MCH 29.3 pg      MCHC 31.1 g/dL      RDW  13.5 %      RDW-SD 46.7 fl      MPV 10.7 fL      Platelets 158 10*3/mm3      Neutrophil % 65.1 %      Lymphocyte % 21.2 %      Monocyte % 7.8 %      Eosinophil % 4.7 %      Basophil % 0.6 %      Immature Grans % 0.6 %      Neutrophils, Absolute 4.26 10*3/mm3      Lymphocytes, Absolute 1.39 10*3/mm3      Monocytes, Absolute 0.51 10*3/mm3      Eosinophils, Absolute 0.31 10*3/mm3      Basophils, Absolute 0.04 10*3/mm3      Immature Grans, Absolute 0.04 10*3/mm3      nRBC 0.0 /100 WBC     Blood Culture - Blood, Arm, Right [[id number]]  (Normal) Collected: 08/11/24 1430    Specimen: Blood from Arm, Right Updated: 08/14/24 1500     Blood Culture No growth at 3 days    TSH [[id number]]  (Normal) Collected: 08/14/24 0301    Specimen: Blood Updated: 08/14/24 1412     TSH 2.140 uIU/mL     Blood Culture - Blood, Arm, Left [[id number]]  (Normal) Collected: 08/11/24 1257    Specimen: Blood from Arm, Left Updated: 08/14/24 1315     Blood Culture No growth at 3 days    Narrative:      Less than seven (7) mL's of blood was collected.  Insufficient quantity may yield false negative results.          Results for orders placed during the hospital encounter of 08/11/24    Adult Transthoracic Echo Complete W/ Cont if Necessary Per Protocol    Interpretation Summary    Left ventricular systolic function is normal. Left ventricular ejection fraction appears to be 56 - 60%.    Left ventricular diastolic function is consistent with (grade II w/high LAP) pseudonormalization.    The left atrial cavity is mild to moderately dilated.    Estimated right ventricular systolic pressure from tricuspid regurgitation is normal (<35 mmHg).        Medication Review:   I have reviewed the patient's current medication list  Scheduled Meds:fentaNYL, 1 patch, Transdermal, Q72H   And  Check Fentanyl Patch Placement, 1 each, Does not apply, Q12H  enoxaparin, 40 mg, Subcutaneous, Q24H  gabapentin, 400 mg, Oral, TID  levothyroxine, 137 mcg, Oral, Q  AM  piperacillin-tazobactam, 3.375 g, Intravenous, Q8H  senna, 1 tablet, Oral, Nightly      Continuous Infusions:   PRN Meds:.  acetaminophen **OR** acetaminophen **OR** acetaminophen    senna-docusate sodium **AND** polyethylene glycol **AND** bisacodyl **AND** bisacodyl    Calcium Replacement - Follow Nurse / BPA Driven Protocol    diazePAM    Magnesium Standard Dose Replacement - Follow Nurse / BPA Driven Protocol    melatonin    ondansetron ODT **OR** ondansetron    oxyCODONE    Phosphorus Replacement - Follow Nurse / BPA Driven Protocol    Potassium Replacement - Follow Nurse / BPA Driven Protocol    [COMPLETED] Insert Peripheral IV **AND** sodium chloride    zolpidem    ECG/EMG Results (last 24 hours)       Procedure Component Value Units Date/Time    Telemetry Scan [[id number]] Resulted: 08/11/24     Updated: 08/15/24 0735    Adult Transthoracic Echo Complete W/ Cont if Necessary Per Protocol [[id number]] Resulted: 08/15/24 0937     Updated: 08/15/24 0939     LVIDd 4.3 cm      LVIDs 2.9 cm      IVSd 0.80 cm      LVPWd 0.90 cm      FS 32.6 %      IVS/LVPW 0.89 cm      ESV(cubed) 24.4 ml      EDV(cubed) 79.5 ml      LV mass(C)d 114.2 grams      LVOT area 2.5 cm2      LVOT diam 1.80 cm      EDV(MOD-sp4) 56.0 ml      ESV(MOD-sp4) 13.0 ml      SV(MOD-sp4) 43.0 ml      EF(MOD-sp4) 76.8 %      MV E max frederic 135.0 cm/sec      MV A max frederic 79.7 cm/sec      MV dec time 0.28 sec      MV E/A 1.69     Med Peak E' Frederic 10.9 cm/sec      Lat Peak E' Frederic 8.6 cm/sec      TR max frederic 258.0 cm/sec      Avg E/e' ratio 13.85     SV(LVOT) 49.4 ml      SV(RVOT) 26.2 ml      Qp/Qs 0.53     RVIDd 2.20 cm      TAPSE (>1.6) 2.6 cm      RV S' 18.2 cm/sec      LV V1 max 77.4 cm/sec      LV V1 max PG 2.40 mmHg      LV V1 mean PG 1.00 mmHg      LV V1 VTI 19.4 cm      Ao pk frederic 107.0 cm/sec      Ao max PG 4.6 mmHg      Ao mean PG 2.00 mmHg      Ao V2 VTI 24.0 cm      EMI(I,D) 2.06 cm2      MV max PG 6.6 mmHg      MV mean PG 2.00 mmHg      MV V2  "VTI 36.8 cm      MV P1/2t 81.2 msec      MVA(P1/2t) 2.7 cm2      MVA(VTI) 1.34 cm2      MV dec slope 497.5 cm/sec2      MR max becky 412.0 cm/sec      MR max PG 67.9 mmHg      TR max PG 26.6 mmHg      RVOT diam 1.50 cm      RV V1 max PG 1.21 mmHg      RV V1 max 55.1 cm/sec      RV V1 VTI 14.8 cm      PA V2 max 73.3 cm/sec      ACS 1.80 cm      Sinus 2.7 cm      STJ 2.20 cm      Dimensionless Index 0.72 (DI)     Narrative:        Left ventricular systolic function is normal. Left ventricular ejection   fraction appears to be 56 - 60%.    Left ventricular diastolic function is consistent with (grade II w/high   LAP) pseudonormalization.    The left atrial cavity is mild to moderately dilated.    Estimated right ventricular systolic pressure from tricuspid   regurgitation is normal (<35 mmHg).              Imaging Results (Last 24 Hours)       ** No results found for the last 24 hours. **          No results found for this or any previous visit.     Results for orders placed during the hospital encounter of 08/11/24    Adult Transthoracic Echo Complete W/ Cont if Necessary Per Protocol    Interpretation Summary    Left ventricular systolic function is normal. Left ventricular ejection fraction appears to be 56 - 60%.    Left ventricular diastolic function is consistent with (grade II w/high LAP) pseudonormalization.    The left atrial cavity is mild to moderately dilated.    Estimated right ventricular systolic pressure from tricuspid regurgitation is normal (<35 mmHg).     Lab Results   Component Value Date    GLUCOSE 90 08/15/2024    BUN 9 08/15/2024    CREATININE 0.31 (L) 08/15/2024    EGFR 102.6 08/15/2024    BCR 29.0 (H) 08/15/2024    K 4.0 08/15/2024    CO2 27.5 08/15/2024    CALCIUM 8.2 (L) 08/15/2024    ALBUMIN 3.6 08/11/2024    BILITOT 0.4 08/11/2024    AST 19 08/11/2024    ALT 6 08/11/2024      No results found for: \"CHOL\", \"CHLPL\", \"TRIG\", \"HDL\", \"LDL\", \"LDLDIRECT\"   Lab Results   Component Value Date    " TROPONINT 18 (H) 08/11/2024        Assessment & Plan       Pneumonia    Hypothyroidism    Generalized muscle weakness    Iron deficiency anemia        Ciara Rojas MD  08/15/24  11:45 EDT

## 2024-08-15 NOTE — PLAN OF CARE
Goal Outcome Evaluation:  Plan of Care Reviewed With: patient, family     Patient alert and oriented x4, VSS on 2L NC, IV abx ongoing.  PRN pain meds given throughout the day.  Tolerating diet, adequate urine output, no BM.  MIPs monitor overflow awaiting transfer/discharge to facility.     Progress: no change

## 2024-08-15 NOTE — PROGRESS NOTES
[facility] MEDICINE SERVICE  DAILY PROGRESS NOTE    NAME: Karina Saleem  : 1940  MRN: [id number]      LOS: 4 days     PROVIDER OF SERVICE: Geovanni Benjamin MD    Chief Complaint: Pneumonia    Subjective:     Interval History:  History taken from: patient chart RN  Breathing stable   HR better controlled  Afebrile     Review of Systems:   Review of Systems  All negative except as above   Objective:     Vital Signs  Temp:  [97.4 °F (36.3 °C)-98.3 °F (36.8 °C)] 97.7 °F (36.5 °C)  Heart Rate:  [46-76] 62  Resp:  [13-20] 15  BP: (103-137)/(43-79) 113/58  Flow (L/min):  [2] 2   Body mass index is 24.8 kg/m².    Physical Exam  Physical Exam  NAD  Frail elderly female  RRR S1-S2 audible  Multiparity  Abdomen soft nontender nondistended  Scheduled Meds   fentaNYL, 1 patch, Transdermal, Q72H   And  Check Fentanyl Patch Placement, 1 each, Does not apply, Q12H  enoxaparin, 40 mg, Subcutaneous, Q24H  gabapentin, 400 mg, Oral, TID  levothyroxine, 137 mcg, Oral, Q AM  piperacillin-tazobactam, 3.375 g, Intravenous, Q8H  senna, 1 tablet, Oral, Nightly       PRN Meds     acetaminophen **OR** acetaminophen **OR** acetaminophen    senna-docusate sodium **AND** polyethylene glycol **AND** bisacodyl **AND** bisacodyl    Calcium Replacement - Follow Nurse / BPA Driven Protocol    diazePAM    Magnesium Standard Dose Replacement - Follow Nurse / BPA Driven Protocol    melatonin    ondansetron ODT **OR** ondansetron    oxyCODONE    Phosphorus Replacement - Follow Nurse / BPA Driven Protocol    Potassium Replacement - Follow Nurse / BPA Driven Protocol    [COMPLETED] Insert Peripheral IV **AND** sodium chloride    zolpidem   Infusions         Diagnostic Data    Results from last 7 days   Lab Units 08/15/24  0535 24  0536 24  1257   WBC 10*3/mm3 6.55   < > 15.02*   HEMOGLOBIN g/dL 10.3*   < > 13.4   HEMATOCRIT % 33.1*   < > 44.2   PLATELETS 10*3/mm3 158   < > 200   GLUCOSE mg/dL 90   < > 116*   CREATININE  mg/dL 0.31*   < > 0.36*   BUN mg/dL 9   < > 13   SODIUM mmol/L 139   < > 130*   POTASSIUM mmol/L 4.0   < > 4.5   AST (SGOT) U/L  --   --  19   ALT (SGPT) U/L  --   --  6   ALK PHOS U/L  --   --  80   BILIRUBIN mg/dL  --   --  0.4   ANION GAP mmol/L 6.5   < > 11.8    < > = values in this interval not displayed.       No radiology results for the last day      I reviewed the patient's new clinical results.  I reviewed the patient's new imaging results and agree with the interpretation.    Assessment/Plan:     Active and Resolved Problems  Active Hospital Problems    Diagnosis  POA    **Pneumonia [J18.9]  Yes    Generalized muscle weakness [M62.81]  Yes    Iron deficiency anemia [D50.9]  Yes    Hypothyroidism [E03.9]  Yes      Resolved Hospital Problems   No resolved problems to display.       83-year-old female with history of anxiety, hypothyroidism, poorly admitted to [facility] 8/11 with fluctuating heart rate found to be hypoxic and hypotensive transferred to ICU now off vasopressors     #Acute hypoxic respiratory failure due to pneumonia/pneumonitis   #Septic shock resolved  Continue Zosyn.  Day 5/5 today. MRSA swab negative  Follow infectious workup  Continue supplemental oxygen as needed to keep SpO2 more than 92 wean off as tolerated  Now off midodrine. BP stable      #Sinus Bradycardia   Unfortunately patient is on multiple medications which can worsen bradycardia  Cards following. To consider holter monitor on dc   Avoid AV ralph blocking agents  Tele monitoring      #Chronic Pain syndrome   On fentanyl patch home dose. Has been on it for may years    po oxycodone pRN dose      #Anxiety   On valium home dose      #Hypothyroidism  Continue home dose of levothyroxine  TSH wnl      #History of polio  Wheelchair-bound at baseline    VTE Prophylaxis:  Pharmacologic VTE prophylaxis orders are present.         Code status is   Code Status and Medical Interventions: No CPR (Do Not Attempt to Resuscitate); Limited  Support; No intubation (DNI)   Ordered at: 08/12/24 0208     Medical Intervention Limits:    No intubation (DNI)     Level Of Support Discussed With:    Patient     Code Status (Patient has no pulse and is not breathing):    No CPR (Do Not Attempt to Resuscitate)     Medical Interventions (Patient has pulse or is breathing):    Limited Support       Plan for disposition:24 hours     Time: 30 minutes    Signature: Electronically signed by Geovanni Benjamin MD, 08/15/24, 12:31 EDT.  [facility] Hospitalist Team

## 2024-08-16 VITALS
DIASTOLIC BLOOD PRESSURE: 50 MMHG | WEIGHT: 140 LBS | RESPIRATION RATE: 17 BRPM | SYSTOLIC BLOOD PRESSURE: 133 MMHG | OXYGEN SATURATION: 93 % | TEMPERATURE: 97.5 F | HEART RATE: 91 BPM | HEIGHT: 63 IN | BODY MASS INDEX: 24.8 KG/M2

## 2024-08-16 PROBLEM — J18.9 PNEUMONIA, UNSPECIFIED ORGANISM: Status: ACTIVE | Noted: 2024-08-16

## 2024-08-16 LAB
ANION GAP SERPL CALCULATED.3IONS-SCNC: 6.7 MMOL/L (ref 5–15)
BACTERIA SPEC AEROBE CULT: NORMAL
BACTERIA SPEC AEROBE CULT: NORMAL
BASOPHILS # BLD AUTO: 0.04 10*3/MM3 (ref 0–0.2)
BASOPHILS NFR BLD AUTO: 0.7 % (ref 0–1.5)
BUN SERPL-MCNC: 7 MG/DL (ref 8–23)
BUN/CREAT SERPL: 20.6 (ref 7–25)
CALCIUM SPEC-SCNC: 8.7 MG/DL (ref 8.6–10.5)
CHLORIDE SERPL-SCNC: 102 MMOL/L (ref 98–107)
CO2 SERPL-SCNC: 27.3 MMOL/L (ref 22–29)
CREAT SERPL-MCNC: 0.34 MG/DL (ref 0.57–1)
DEPRECATED RDW RBC AUTO: 45.8 FL (ref 37–54)
EGFRCR SERPLBLD CKD-EPI 2021: 100.4 ML/MIN/1.73
EOSINOPHIL # BLD AUTO: 0.33 10*3/MM3 (ref 0–0.4)
EOSINOPHIL NFR BLD AUTO: 5.8 % (ref 0.3–6.2)
ERYTHROCYTE [DISTWIDTH] IN BLOOD BY AUTOMATED COUNT: 13.4 % (ref 12.3–15.4)
GLUCOSE SERPL-MCNC: 95 MG/DL (ref 65–99)
HCT VFR BLD AUTO: 34.7 % (ref 34–46.6)
HGB BLD-MCNC: 10.6 G/DL (ref 12–15.9)
IMM GRANULOCYTES # BLD AUTO: 0.03 10*3/MM3 (ref 0–0.05)
IMM GRANULOCYTES NFR BLD AUTO: 0.5 % (ref 0–0.5)
LYMPHOCYTES # BLD AUTO: 1.36 10*3/MM3 (ref 0.7–3.1)
LYMPHOCYTES NFR BLD AUTO: 24 % (ref 19.6–45.3)
MCH RBC QN AUTO: 28.4 PG (ref 26.6–33)
MCHC RBC AUTO-ENTMCNC: 30.5 G/DL (ref 31.5–35.7)
MCV RBC AUTO: 93 FL (ref 79–97)
MONOCYTES # BLD AUTO: 0.59 10*3/MM3 (ref 0.1–0.9)
MONOCYTES NFR BLD AUTO: 10.4 % (ref 5–12)
NEUTROPHILS NFR BLD AUTO: 3.31 10*3/MM3 (ref 1.7–7)
NEUTROPHILS NFR BLD AUTO: 58.6 % (ref 42.7–76)
NRBC BLD AUTO-RTO: 0 /100 WBC (ref 0–0.2)
PLATELET # BLD AUTO: 176 10*3/MM3 (ref 140–450)
PMV BLD AUTO: 10.7 FL (ref 6–12)
POTASSIUM SERPL-SCNC: 4.3 MMOL/L (ref 3.5–5.2)
RBC # BLD AUTO: 3.73 10*6/MM3 (ref 3.77–5.28)
SODIUM SERPL-SCNC: 136 MMOL/L (ref 136–145)
WBC NRBC COR # BLD AUTO: 5.66 10*3/MM3 (ref 3.4–10.8)

## 2024-08-16 PROCEDURE — 80048 BASIC METABOLIC PNL TOTAL CA: CPT | Performed by: NURSE PRACTITIONER

## 2024-08-16 PROCEDURE — 85025 COMPLETE CBC W/AUTO DIFF WBC: CPT | Performed by: NURSE PRACTITIONER

## 2024-08-16 PROCEDURE — 25010000002 PIPERACILLIN SOD-TAZOBACTAM PER 1 G

## 2024-08-16 PROCEDURE — 99232 SBSQ HOSP IP/OBS MODERATE 35: CPT | Performed by: INTERNAL MEDICINE

## 2024-08-16 RX ADMIN — PIPERACILLIN AND TAZOBACTAM 3.38 G: 3; .375 INJECTION, POWDER, FOR SOLUTION INTRAVENOUS at 01:36

## 2024-08-16 RX ADMIN — GABAPENTIN 400 MG: 400 CAPSULE ORAL at 08:34

## 2024-08-16 RX ADMIN — PIPERACILLIN AND TAZOBACTAM 3.38 G: 3; .375 INJECTION, POWDER, FOR SOLUTION INTRAVENOUS at 10:29

## 2024-08-16 RX ADMIN — OXYCODONE HYDROCHLORIDE 5 MG: 5 TABLET ORAL at 06:13

## 2024-08-16 RX ADMIN — LEVOTHYROXINE SODIUM 137 MCG: 0.11 TABLET ORAL at 05:15

## 2024-08-16 RX ADMIN — OXYCODONE HYDROCHLORIDE 5 MG: 5 TABLET ORAL at 01:40

## 2024-08-16 NOTE — NURSING NOTE
Report attempted to be called to [facility], no answer. EMS notified of discharge. Will attempt to call report again shortly.

## 2024-08-16 NOTE — PLAN OF CARE
Goal Outcome Evaluation:  Plan of Care Reviewed With: patient  Progress: no change  Outcome Evaluation: patient adequate for discharge.      Problem: Adult Inpatient Plan of Care  Goal: Plan of Care Review  Outcome: Adequate for Care Transition  Flowsheets (Taken 8/16/2024 1047)  Progress: no change  Plan of Care Reviewed With: patient  Outcome Evaluation: patient adequate for discharge.  Goal: Patient-Specific Goal (Individualized)  Outcome: Adequate for Care Transition  Goal: Absence of Hospital-Acquired Illness or Injury  Outcome: Adequate for Care Transition  Intervention: Identify and Manage Fall Risk  Description: Perform standard risk assessment on admission using a validated tool or comprehensive approach appropriate to the patient; reassess fall risk frequently, with change in status or transfer to another level of care.  Communicate fall injury risk to interprofessional healthcare team.  Determine need for increased observation, equipment and environmental modification, such as low bed, signage and supportive, nonskid footwear.  Adjust safety measures to individual developmental age, stage and identified risk factors.  Reinforce the importance of safety and physical activity with patient and family.  Perform regular intentional rounding to assess need for position change, pain assessment and personal needs, including assistance with toileting.  Recent Flowsheet Documentation  Taken 8/16/2024 1000 by Jg Heller, RN  Safety Promotion/Fall Prevention: safety round/check completed  Taken 8/16/2024 0900 by Jg Heller, RN  Safety Promotion/Fall Prevention: safety round/check completed  Taken 8/16/2024 0800 by Jg Heller, RN  Safety Promotion/Fall Prevention: safety round/check completed  Taken 8/16/2024 0700 by Jg Heller, RN  Safety Promotion/Fall Prevention: safety round/check completed  Intervention: Prevent Skin Injury  Description: Perform a screening for skin injury risk,  such as pressure or moisture associated skin damage on admission and at regular intervals throughout hospital stay.  Keep all areas of skin (especially folds) clean and dry.  Maintain adequate skin hydration.  Relieve and redistribute pressure and protect bony prominences; implement measures based on patient-specific risk factors.  Match turning and repositioning schedule to clinical condition.  Encourage weight shift frequently; assist with reposition if unable to complete independently.  Float heels off bed; avoid pressure on the Achilles tendon.  Keep skin free from extended contact with medical devices.  Encourage functional activity and mobility, as early as tolerated.  Use aids (e.g., slide boards, mechanical lift) during transfer.  Recent Flowsheet Documentation  Taken 8/16/2024 0900 by Jg Heller, RN  Body Position:   turned   supine   supine, legs elevated  Taken 8/16/2024 0834 by Jg Heller, RN  Skin Protection:   adhesive use limited   skin-to-skin areas padded   skin-to-device areas padded   tubing/devices free from skin contact   incontinence pads utilized   pulse oximeter probe site changed   protective footwear used  Taken 8/16/2024 0700 by Jg Heller, RN  Body Position:   turned   right   legs elevated  Intervention: Prevent and Manage VTE (Venous Thromboembolism) Risk  Description: Assess for VTE (venous thromboembolism) risk.  Encourage and assist with early ambulation.  Initiate and maintain compression or other therapy, as indicated, based on identified risk in accordance with organizational protocol and provider order.  Encourage both active and passive leg exercises while in bed, if unable to ambulate.  Recent Flowsheet Documentation  Taken 8/16/2024 0834 by Jg Heller, RN  Activity Management: bedrest  VTE Prevention/Management: (Patient educated on risk on refusing SCD's)   bilateral   sequential compression devices off   patient refused  intervention  Intervention: Prevent Infection  Description: Maintain skin and mucous membrane integrity; promote hand, oral and pulmonary hygiene.  Optimize fluid balance, nutrition, sleep and glycemic control to maximize infection resistance.  Identify potential sources of infection early to prevent or mitigate progression of infection (e.g., wound, lines, devices).  Evaluate ongoing need for invasive devices; remove promptly when no longer indicated.  Recent Flowsheet Documentation  Taken 8/16/2024 0834 by Jg Heller RN  Infection Prevention:   environmental surveillance performed   equipment surfaces disinfected   hand hygiene promoted   personal protective equipment utilized   single patient room provided  Goal: Optimal Comfort and Wellbeing  Outcome: Adequate for Care Transition  Intervention: Monitor Pain and Promote Comfort  Description: Assess pain level, treatment efficacy and patient response at regular intervals using a consistent pain scale.  Consider the presence and impact of preexisting chronic pain.  Encourage patient and caregiver involvement in pain assessment, interventions and safety measures.  Recent Flowsheet Documentation  Taken 8/16/2024 0834 by Jg Heller RN  Pain Management Interventions:   care clustered   unnecessary movement minimized   quiet environment facilitated   position adjusted   pillow support provided  Intervention: Provide Person-Centered Care  Description: Use a family-focused approach to care.  Develop trust and rapport by proactively providing information, encouraging questions, addressing concerns and offering reassurance.  Acknowledge emotional response to hospitalization.  Recognize and utilize personal coping strategies.  Honor spiritual and cultural preferences.  Recent Flowsheet Documentation  Taken 8/16/2024 0834 by Jg Heller RN  Trust Relationship/Rapport:   care explained   reassurance provided  Goal: Readiness for Transition of  Care  Outcome: Adequate for Care Transition

## 2024-08-16 NOTE — DISCHARGE SUMMARY
"[facility] Medicine Services  Discharge Summary    Date of Service: 24  Patient Name: Karina Saleem  : 1940  MRN: [id number]    Date of Admission: 2024  Discharge Diagnosis: #Acute hypoxic respiratory failure due to pneumonia/pneumonitis   #Septic shock resolved  Date of Discharge:  24  Primary Care Physician: Edgar Barriga MD      Presenting Problem:   Pneumonia [J18.9]  Pneumonia due to infectious organism, unspecified laterality, unspecified part of lung [J18.9]    Active and Resolved Hospital Problems:  Active Hospital Problems    Diagnosis POA    **Pneumonia [J18.9] Yes    Pneumonia, unspecified organism [J18.9] Yes    Generalized muscle weakness [M62.81] Yes    Iron deficiency anemia [D50.9] Yes    Hypothyroidism [E03.9] Yes      Resolved Hospital Problems   No resolved problems to display.         Hospital Course     HPI:  Admitting team HPI   Karina Saleem is a 83 y.o. female with a CMH of h/o polio, hypothyroidism and anxiety who presented to [facility] on 2024  from [facility] due to patient reports of heart rate \"going up and down\". Patient otherwise denies chest pain, shortness of breath, cough, fever and chills. Denies needing oxygen at baseline. Per EMS, patient was noted to be hypoxic with sats in the 70s and was placed on 15L NRB. Patient also noted to be febrile with temp of 102.4. ABG with pH 7.42, PO2 58, pCO2 33.6. Labs were pertinent for WBC 15 with LA of 2.1. Troponin 16 with BNP of 243. CXR revealed findings concerning for pneumonia/pneumonitis involving the left mid-lower lung. Patient was started on Vancomycin and Zosyn. Hospitalist service to admit for further management.     Hospital Course:  83-year-old female with history of anxiety, hypothyroidism, poorly admitted to [facility]  with fluctuating heart rate found to be hypoxic and hypotensive transferred to ICU now off vasopressors     #Acute hypoxic " respiratory failure due to pneumonia/pneumonitis   #Septic shock resolved  Finished empiric course of Zosyn MRSA swab negative  Continue supplemental oxygen as needed to keep SpO2 more than 92 wean off as tolerated  Now off midodrine. BP stable      #Sinus Bradycardia   Unfortunately patient is on multiple medications which can worsen bradycardia  Seen by Cards.  Avoid AV ralph blocking agents  Holter on dc per cards     #Chronic Pain syndrome   Continue home regimen      #Anxiety   On valium home dose      #Hypothyroidism  Continue home dose of levothyroxine  TSH wnl      #History of polio  Wheelchair-bound at baseline        DISCHARGE Follow Up Recommendations for labs and diagnostics: aspiration precautions         Reasons For Change In Medications and Indications for New Medications:      Day of Discharge     Vital Signs:  Temp:  [97.5 °F (36.4 °C)-99 °F (37.2 °C)] 97.5 °F (36.4 °C)  Heart Rate:  [54-91] 91  Resp:  [15-17] 17  BP: ()/(43-78) 133/50  Flow (L/min):  [2] 2    Physical Exam:  Physical Exam NAD  Frail elderly female  RRR S1-S2 audible  Multiparity  Abdomen soft nontender nondistended      Pertinent  and/or Most Recent Results     LAB RESULTS:      Lab 08/16/24  0530 08/15/24  0535 08/14/24  0301 08/13/24  0504 08/12/24  0536 08/11/24  2343 08/11/24  2042 08/11/24  1741 08/11/24  1443 08/11/24  1440 08/11/24  1430   WBC 5.66 6.55 12.04* 14.45* 24.10*  --   --   --   --   --   --    HEMOGLOBIN 10.6* 10.3* 11.4* 10.8* 11.3*  --   --   --   --   --   --    HEMATOCRIT 34.7 33.1* 37.4 34.5 35.2  --   --   --   --   --   --    PLATELETS 176 158 198 187 217  --   --   --   --   --   --    NEUTROS ABS 3.31 4.26 9.40* 11.95* 21.16*  --   --   --   --   --   --    IMMATURE GRANS (ABS) 0.03 0.04 0.04 0.05 0.14*  --   --   --   --   --   --    LYMPHS ABS 1.36 1.39 1.46 1.36 1.73  --   --   --   --   --   --    MONOS ABS 0.59 0.51 0.71 0.70 0.89  --   --   --   --   --   --    EOS ABS 0.33 0.31 0.35 0.35  0.13  --   --   --   --   --   --    MCV 93.0 94.3 95.4 93.0 92.4  --   --   --   --   --   --    PROCALCITONIN  --   --   --   --   --   --   --   --   --  0.47*  --    LACTATE  --   --   --   --   --  1.2 2.2* 3.9* 2.1*  --   --    PROTIME  --   --   --   --   --   --   --   --   --   --  11.0   APTT  --   --   --   --   --   --   --   --   --   --  29.4*         Lab 08/16/24  0540 08/15/24  0535 08/14/24  0301 08/13/24  0504 08/12/24  0614   SODIUM 136 139 136 135* 136   POTASSIUM 4.3 4.0 4.3 3.9 3.8   CHLORIDE 102 105 104 104 105   CO2 27.3 27.5 24.0 25.7 24.4   ANION GAP 6.7 6.5 8.0 5.3 6.6   BUN 7* 9 12 11 10   CREATININE 0.34* 0.31* 0.30* 0.27* 0.32*   EGFR 100.4 102.6 103.5 106.1 101.9   GLUCOSE 95 90 99 121* 105*   CALCIUM 8.7 8.2* 8.5* 8.4* 8.2*   MAGNESIUM  --   --  2.0 1.9 1.8   TSH  --   --  2.140  --   --          Lab 08/11/24  1257   TOTAL PROTEIN 6.3   ALBUMIN 3.6   GLOBULIN 2.7   ALT (SGPT) 6   AST (SGOT) 19   BILIRUBIN 0.4   ALK PHOS 80         Lab 08/11/24  1440 08/11/24  1430 08/11/24  1257   PROBNP  --   --  243.0   HSTROP T 18*  --  16*   PROTIME  --  11.0  --    INR  --  1.01  --                  Lab 08/11/24  1312   PH, ARTERIAL 7.427   PCO2, ARTERIAL 33.6*   PO2 ART 58.2*   O2 SATURATION ART 90.8*   FIO2 28   HCO3 ART 22.2   BASE EXCESS ART -1.5*     Brief Urine Lab Results  (Last result in the past 365 days)        Color   Clarity   Blood   Leuk Est   Nitrite   Protein   CREAT   Urine HCG        08/11/24 1658 Yellow   Clear   Negative   Small (1+)   Negative   Negative                 Microbiology Results (last 10 days)       Procedure Component Value - Date/Time    MRSA Screen, PCR (Inpatient) - Swab, Nares [[id number]]  (Normal) Collected: 08/11/24 1527    Lab Status: Final result Specimen: Swab from Nares Updated: 08/11/24 1904     MRSA PCR No MRSA Detected    Narrative:      The negative predictive value of this diagnostic test is high and should only be used to consider de-escalating  anti-MRSA therapy. A positive result may indicate colonization with MRSA and must be correlated clinically.    Blood Culture - Blood, Arm, Right [[id number]]  (Normal) Collected: 08/11/24 1430    Lab Status: Preliminary result Specimen: Blood from Arm, Right Updated: 08/15/24 1500     Blood Culture No growth at 4 days    Respiratory Panel PCR w/COVID-19(SARS-CoV-2) LUIS/ELIZABETH/MEGAN/PAD/COR/ROBIN In-House, NP Swab in UTM/VTM, 2 HR TAT - Swab, Nasopharynx [[id number]]  (Normal) Collected: 08/11/24 1301    Lab Status: Final result Specimen: Swab from Nasopharynx Updated: 08/11/24 1405     ADENOVIRUS, PCR Not Detected     Coronavirus 229E Not Detected     Coronavirus HKU1 Not Detected     Coronavirus NL63 Not Detected     Coronavirus OC43 Not Detected     COVID19 Not Detected     Human Metapneumovirus Not Detected     Human Rhinovirus/Enterovirus Not Detected     Influenza A PCR Not Detected     Influenza B PCR Not Detected     Parainfluenza Virus 1 Not Detected     Parainfluenza Virus 2 Not Detected     Parainfluenza Virus 3 Not Detected     Parainfluenza Virus 4 Not Detected     RSV, PCR Not Detected     Bordetella pertussis pcr Not Detected     Bordetella parapertussis PCR Not Detected     Chlamydophila pneumoniae PCR Not Detected     Mycoplasma pneumo by PCR Not Detected    Narrative:      In the setting of a positive respiratory panel with a viral infection PLUS a negative procalcitonin without other underlying concern for bacterial infection, consider observing off antibiotics or discontinuation of antibiotics and continue supportive care. If the respiratory panel is positive for atypical bacterial infection (Bordetella pertussis, Chlamydophila pneumoniae, or Mycoplasma pneumoniae), consider antibiotic de-escalation to target atypical bacterial infection.    Blood Culture - Blood, Arm, Left [[id number]]  (Normal) Collected: 08/11/24 1257    Lab Status: Preliminary result Specimen: Blood from Arm, Left Updated: 08/15/24 1315      Blood Culture No growth at 4 days    Narrative:      Less than seven (7) mL's of blood was collected.  Insufficient quantity may yield false negative results.            XR Chest 1 View    Result Date: 8/12/2024  Impression: Impression: Bibasilar opacities similar to prior exam, which could reflect atelectasis or pneumonia. Electronically Signed: Bon Mcmillan MD  8/12/2024 11:25 AM EDT  Workstation ID: [id number]    XR Chest 1 View    Result Date: 8/11/2024  Impression: Impression: Findings concerning for pneumonia/pneumonitis involving the left mid to lower lung. Additional chronic findings as characterized above Electronically Signed: Nestor Rosen DO  8/11/2024 1:15 PM EDT  Workstation ID: [id number]             Results for orders placed during the hospital encounter of 08/11/24    Adult Transthoracic Echo Complete W/ Cont if Necessary Per Protocol    Interpretation Summary    Left ventricular systolic function is normal. Left ventricular ejection fraction appears to be 56 - 60%.    Left ventricular diastolic function is consistent with (grade II w/high LAP) pseudonormalization.    The left atrial cavity is mild to moderately dilated.    Estimated right ventricular systolic pressure from tricuspid regurgitation is normal (<35 mmHg).      Labs Pending at Discharge:  Pending Labs       Order Current Status    Blood Culture - Blood, Arm, Left Preliminary result    Blood Culture - Blood, Arm, Right Preliminary result            Procedures Performed           Consults:   Consults       Date and Time Order Name Status Description    8/14/2024 12:21 PM Inpatient Cardiac Electrophysiology Consult Completed     8/13/2024  9:18 AM Inpatient Hospitalist Consult Completed     8/11/2024 11:04 PM Inpatient Intensivist Consult      8/11/2024  2:23 PM Hospitalist (on-call MD unless specified)                Discharge Details        Discharge Medications        Continue These Medications        Instructions Start Date    acetaminophen 325 MG tablet  Commonly known as: TYLENOL   650 mg, Oral, Every 4 Hours PRN      diazePAM 5 MG tablet  Commonly known as: VALIUM   5 mg, Oral, Daily      docusate sodium 100 MG capsule   100 mg, Oral, Daily PRN      Enoxaparin Sodium 40 MG/0.4ML solution prefilled syringe syringe  Commonly known as: LOVENOX   40 mg, Subcutaneous, Daily      fentaNYL 100 MCG/HR patch  Commonly known as: DURAGESIC   1 patch, Transdermal, Every 72 Hours      gabapentin 400 MG capsule  Commonly known as: NEURONTIN   400 mg, Oral, 3 Times Daily      levothyroxine 137 MCG tablet  Commonly known as: SYNTHROID, LEVOTHROID   137 mcg, Oral, Daily      ondansetron ODT 8 MG disintegrating tablet  Commonly known as: ZOFRAN-ODT   8 mg, Translingual, Every 4 Hours PRN      oxyCODONE-acetaminophen  MG per tablet  Commonly known as: PERCOCET   1 tablet, Oral, Every 4 Hours PRN      senna 8.6 MG tablet  Commonly known as: SENOKOT   1 tablet, Oral, Nightly      zolpidem 10 MG tablet  Commonly known as: AMBIEN   10 mg, Oral, Every Night at Bedtime               Allergies   Allergen Reactions    Latex Rash    Ceftazidime Unknown - High Severity and Other (See Comments)    Furosemide Hives, Unknown (See Comments) and Unknown - Low Severity    Pantoprazole Nausea And Vomiting and Unknown - Low Severity    Pantoprazole Sodium Nausea And Vomiting    Tizanidine Dizziness and Other (See Comments)         Discharge Disposition: Rehab   Skilled Nursing Facility (DC - External)    Diet:  Hospital:  Diet Order   Procedures    Diet: Regular/House; Fluid Consistency: Thin (IDDSI 0)         Discharge Activity:         CODE STATUS:  Code Status and Medical Interventions: No CPR (Do Not Attempt to Resuscitate); Limited Support; No intubation (DNI)   Ordered at: 08/12/24 0208     Medical Intervention Limits:    No intubation (DNI)     Level Of Support Discussed With:    Patient     Code Status (Patient has no pulse and is not breathing):    No CPR  (Do Not Attempt to Resuscitate)     Medical Interventions (Patient has pulse or is breathing):    Limited Support         Future Appointments   Date Time Provider Department Center   9/3/2024 10:00 AM Radha Dupree APRN [facility]   9/3/2024  2:00 PM Aditi Agarwal DO [facility]           Time spent on Discharge including face to face service:  >30 minutes    Signature: Electronically signed by Geovanni Benjamin MD, 08/16/24, 09:26 EDT.  [facility] Hospitalist Team

## 2024-08-16 NOTE — PROGRESS NOTES
[facility] CARDIOLOGY  [facility]        LOS:  LOS: 5 days   Patient Name: Karina Saleem  Age/Sex: 84 y.o. female  : 1940  MRN: [id number]    Day of Service: 24   Length of Stay: 5  Encounter Provider: MICHEAL Clark  Place of Service: [facility] CARDIOLOGY  Patient Care Team:  Edgar Barriga MD as PCP - General (Hospitalist)  Fahad Crouch MD (Orthopedic Surgery)    Cardiology assessment and plan     Asymptomatic sinus bradycardia /resolved  Heart rate has improved  Respiratory failure pneumonia sepsis septic shock  Hyperlipidemia  Hypothyroidism/normal TSH  History of polio and bedridden status  Normal LV systolic function     Denies any new cardiac symptoms  Heart rate is improved compared to yesterday  Tmax is 97.5 pulse is 90 respirations are 16 blood pressure is 132/50 sats are 92%  No clear indication for pacemaker placement  Considering patient frail status and asymptomatic status plan to treat patient conservatively at this time  Avoid AV ralph blocking medication  No further episodes of bradycardia  Stable from cardiac standpoint  No need for extended Holter monitor study at this time  If there is any further issues with symptomatic bradycardia consider cardiac evaluation in future  Discussed with patient at bedside  Stable from cardiac standpoint for discharge            Subjective:     Chief Complaint:  F/U bradycardia    Subjective:   Patient has no CV complaints today.    Current Medications:   Scheduled Meds:fentaNYL, 1 patch, Transdermal, Q72H   And  Check Fentanyl Patch Placement, 1 each, Does not apply, Q12H  enoxaparin, 40 mg, Subcutaneous, Q24H  gabapentin, 400 mg, Oral, TID  levothyroxine, 137 mcg, Oral, Q AM  piperacillin-tazobactam, 3.375 g, Intravenous, Q8H  senna, 1 tablet, Oral, Nightly      Continuous Infusions:     Allergies:  Allergies   Allergen Reactions    Latex Rash     Ceftazidime Unknown - High Severity and Other (See Comments)    Furosemide Hives, Unknown (See Comments) and Unknown - Low Severity    Pantoprazole Nausea And Vomiting and Unknown - Low Severity    Pantoprazole Sodium Nausea And Vomiting    Tizanidine Dizziness and Other (See Comments)       Review of Systems   Constitutional: Negative for chills, decreased appetite and malaise/fatigue.   HENT:  Negative for congestion and nosebleeds.    Eyes:  Negative for blurred vision and double vision.   Cardiovascular:  Negative for chest pain, dyspnea on exertion, irregular heartbeat, leg swelling, near-syncope, orthopnea, palpitations and paroxysmal nocturnal dyspnea.   Respiratory:  Negative for cough and shortness of breath.    Hematologic/Lymphatic: Negative for adenopathy. Does not bruise/bleed easily.   Skin:  Negative for color change and rash.   Musculoskeletal:  Negative for back pain and joint pain.   Gastrointestinal:  Negative for bloating, abdominal pain, hematemesis and hematochezia.   Genitourinary:  Negative for flank pain and hematuria.   Neurological:  Negative for dizziness and focal weakness.   Psychiatric/Behavioral:  Negative for altered mental status. The patient does not have insomnia.        Objective:     Temp:  [97.5 °F (36.4 °C)-99 °F (37.2 °C)] 97.5 °F (36.4 °C)  Heart Rate:  [54-91] 91  Resp:  [15-17] 17  BP: ()/(43-78) 133/50     Intake/Output Summary (Last 24 hours) at 8/16/2024 1103  Last data filed at 8/16/2024 0518  Gross per 24 hour   Intake 1268 ml   Output --   Net 1268 ml     Body mass index is 24.8 kg/m².      08/12/24  0145 08/13/24  0539 08/15/24  0647   Weight: 61.1 kg (134 lb 11.2 oz) 63.8 kg (140 lb 10.5 oz) 63.5 kg (140 lb)         Physical Exam:  Neuro:  CV:  Resp:  GI:  Ext:  Tele: AAOx3, no gross deficits  S1S2 RRR, no murmur  Nonlabored, CTA  BS+, abd soft  Pedal pulses palp, mild non-pitting BLE edema  SR/SB down to 50s                                              "      Lab Review:   Results from last 7 days   Lab Units 08/16/24  0540 08/15/24  0535 08/12/24  0614 08/11/24  1257   SODIUM mmol/L 136 139   < > 130*   POTASSIUM mmol/L 4.3 4.0   < > 4.5   CHLORIDE mmol/L 102 105   < > 98   CO2 mmol/L 27.3 27.5   < > 20.2*   BUN mg/dL 7* 9   < > 13   CREATININE mg/dL 0.34* 0.31*   < > 0.36*   GLUCOSE mg/dL 95 90   < > 116*   CALCIUM mg/dL 8.7 8.2*   < > 8.7   AST (SGOT) U/L  --   --   --  19   ALT (SGPT) U/L  --   --   --  6    < > = values in this interval not displayed.     Results from last 7 days   Lab Units 08/11/24  1440 08/11/24  1257   HSTROP T ng/L 18* 16*     Results from last 7 days   Lab Units 08/16/24  0530 08/15/24  0535   WBC 10*3/mm3 5.66 6.55   HEMOGLOBIN g/dL 10.6* 10.3*   HEMATOCRIT % 34.7 33.1*   PLATELETS 10*3/mm3 176 158     Results from last 7 days   Lab Units 08/11/24  1430   INR  1.01   APTT seconds 29.4*     Results from last 7 days   Lab Units 08/14/24  0301 08/13/24  0504   MAGNESIUM mg/dL 2.0 1.9           Invalid input(s): \"LDLCALC\"  Results from last 7 days   Lab Units 08/11/24  1257   PROBNP pg/mL 243.0     Results from last 7 days   Lab Units 08/14/24  0301   TSH uIU/mL 2.140       Recent Radiology:  Imaging Results (Most Recent)       Procedure Component Value Units Date/Time    XR Chest 1 View [[id number]] Collected: 08/12/24 1122     Updated: 08/12/24 1127    Narrative:      XR CHEST 1 VW    Date of Exam: 8/12/2024 10:42 AM EDT    Indication: Pneumonia    Comparison: August 11, 2024    Findings:  There is elevation of the right hemidiaphragm. There are bibasilar opacities. The heart and mediastinal contours appear stable. The pulmonary vasculature appears normal. A right-sided PICC has its tip at the upper SVC. There is dextroscoliosis of the   midthoracic spine. There are degenerative changes along the left shoulder.      Impression:      Impression:  Bibasilar opacities similar to prior exam, which could reflect atelectasis or " pneumonia.      Electronically Signed: Bon Mcmillan MD    8/12/2024 11:25 AM EDT    Workstation ID: [id number]    XR Chest 1 View [[id number]] Collected: 08/11/24 1313     Updated: 08/11/24 1317    Narrative:      XR CHEST 1 VW    Date of Exam: 8/11/2024 12:59 PM EDT    Indication: soa    Comparison: 6/15/2024    Findings:  Lines: None    Lungs: Chronic elevation of the right hemidiaphragm. Patchy opacities noted in the left mid to lower lung, predominantly in the perihilar region. The upper lungs are relatively clear.  Pleura: No pleural effusion or pneumothorax.    Cardiomediastinum: Unchanged    Soft Tissues: Unremarkable.    Bones: No acute osseous abnormality. Moderate to severe S-shaped scoliosis of the thoracolumbar spine.      Impression:      Impression:  Findings concerning for pneumonia/pneumonitis involving the left mid to lower lung.    Additional chronic findings as characterized above      Electronically Signed: Nestor Rosen DO    8/11/2024 1:15 PM EDT    Workstation ID: [id number]            ECHOCARDIOGRAM:    Results for orders placed during the hospital encounter of 08/11/24    Adult Transthoracic Echo Complete W/ Cont if Necessary Per Protocol    Interpretation Summary    Left ventricular systolic function is normal. Left ventricular ejection fraction appears to be 56 - 60%.    Left ventricular diastolic function is consistent with (grade II w/high LAP) pseudonormalization.    The left atrial cavity is mild to moderately dilated.    Estimated right ventricular systolic pressure from tricuspid regurgitation is normal (<35 mmHg).        I reviewed the patient's new clinical results.    EKG:      Assessment:       Pneumonia    Hypothyroidism    Generalized muscle weakness    Iron deficiency anemia    Pneumonia, unspecified organism    1) Asymptomatic Bradycardia  - Initial EKG on admit shows ST in setting sepsis  - K 4.3, Mg 2.0  - not on AV ralph blockers  - not exclusively nocturnal     2)  Respiratory Failure / PNA complicated by septic shock  - off pressors     3) HLD     4) hypothyroidism     5) hx polio    Plan:   Tele shows SR/SB down to 50s.  Avoid AV ralph blockers.  Appears CV stable for discharge to SNF.  Will sign off and see PRN.        Electronically signed by MICHEAL Clark, 08/16/24, 11:08 AM EDT.

## 2024-08-16 NOTE — CASE MANAGEMENT/SOCIAL WORK
Case Management Discharge Note      Final Note: SNF - [facility]         Selected Continued Care - Discharged on 8/16/2024 Admission date: 8/11/2024 - Discharge disposition: Skilled Nursing Facility (DC - External)      Destination Coordination complete.      Service Provider Selected Services Address Phone Fax Patient Preferred    [address] [facility] [address] [facility] [address] [facility] [place] [address] [phone] --               Transportation Services  Ambulance: [facility]    Final Discharge Disposition Code: 03 - skilled nursing facility (SNF)    STANFORD Baeza RN  SIPS/ICU   O: [phone]  C: [phone]  [email or web address]

## 2024-08-16 NOTE — CASE MANAGEMENT/SOCIAL WORK
"Physicians Statement of Medical Necessity for  Ambulance Transportation    GENERAL INFORMATION     Name: Karina Saleem  YOB: 1940  Medicare #:     [id number]     Transport Date: 8/16/24 (Valid for round trips this date, or for scheduled repetitive trips for 60 days from the date signed below.)  Origin: [facility]  Destination: [facility]  Is the Patient's stay covered under Medicare Part A (PPS/DRG?)Yes  Closest appropriate facility? Yes  If this a hosp-hosp transfer? No  Is this a hospice patient? No    MEDICAL NECESSITY QUESTIONAIRE    Ambulance Transportation is medically necessary only if other means of transportation are contraindicated or would be potentially harmful to the patient.  To meet this requirement, the patient must be either \"bed confined\" or suffer from a condition such that transport by means other than an ambulance is contraindicated by the patient's condition.  The following questions must be answered by the healthcare professional signing below for this form to be valid:     1) Describe the MEDICAL CONDITION (physical and/or mental) of this patient AT THE TIME OF AMBULANCE TRANSPORT that requires the patient to be transported in an ambulance, and why transport by other means is contraindicated by the patient's condition: bedbound  Past Medical History:   Diagnosis Date    Anxiety     Cataracts, bilateral     Femur fracture, right     Hypothyroidism     Insomnia     Kyphoscoliosis     MAMMO     NEG = 2019    Osteoporosis     Paraplegic immobility syndrome     Peripheral neuropathy     Polio osteopathy of lower leg, left     Polio osteopathy of lower leg, right     Scoliosis       Past Surgical History:   Procedure Laterality Date    APPENDECTOMY      LIPOMA EXCISION Bilateral     breast    TOTAL ABDOMINAL HYSTERECTOMY        2) Is this patient \"bed confined\" as defined below?Yes   To be \"bed confined\" the patient must satisfy all three of the following criteria:  (1) " unable to get up from bed without assistance; AND (2) unable to ambulate;  AND (3) unable to sit in a chair or wheelchair.  3) Can this patient safely be transported by car or wheelchair van (I.e., may safely sit during transport, without an attendant or monitoring?)No   4. In addition to completing questions 1-3 above, please check any of the following conditions that apply*:          *Note: supporting documentation for any boxes checked must be maintained in the patient's medical records Patient is confused, Requires oxygen - unable to self administer, and Unable to tolerate seated position for time needed to transport      SIGNATURE OF PHYSICIAN OR OTHER AUTHORIZED HEALTHCARE PROFESSIONAL    I certify that the above information is true and correct based on my evaluation of this patient, and represent that the patient requires transport by ambulance and that other forms of transport are contraindicated.  I understand that this information will be used by the Centers for Medicare and Medicaid Services (CMS) to support the determiniation of medical necessity for ambulance services, and I represent that I have personal knowledge of the patient's condition at the time of transport.    x   If this box is checked, I also certify that the patient is physically or mentally incapable of signing the ambulance service's claim form and that the institution with which I am affiliated has furnished care, services or assistance to the patient.  My signature below is made on behalf of the patient pursuant to 42 .36(b)(4). In accordance with 42 .37, the specific reason(s) that the patient is physically or mentally incapable of signing the claim for is as follows: bedbound and confused    Signature of Physician or Healthcare Professional     STANFORD Baeza RN Date/Time:   8/16/24     (For Scheduled repetitive transport, this form is not valid for transports performed more than 60 days after this date).                                                                                                                                             --------------------------------------------------------------------------------------------  Printed Name and Credentials of Physician or Authorized Healthcare Professional     *Form must be signed by patient's attending physician for scheduled, repetitive transports,.  For non-repetitive ambulance transports, if unable to obtain the signature of the attending physician, any of the following may sign (please select below):     Physician  Clinical Nurse Specialist x Registered Nurse     Physician Assistant x Discharge Planner  Licensed Practical Nurse     Nurse Practitioner x

## 2024-08-16 NOTE — CASE MANAGEMENT/SOCIAL WORK
"Continued Stay Note  [facility]     Patient Name: Karina Saleem  MRN: [id number]  Today's Date: 8/16/2024    Admit Date: 8/11/2024    Plan: Plan to dc to [facility] ([facility] bed available for her to return). No Precert required. No new PASRR required.   Discharge Plan       Row Name 08/16/24 1030       Plan    Plan Plan to dc to [facility] ([facility] has no bed available for her to return). No Precert required. No new PASRR required.    Plan Comments CM spoke with patient at bedside concerning her traqnsfer to , [facility] today, she is not happy about it but knows she has to go \"somewhere\" today and she can check sith [facility] regularly to see when /if they get an open bed.  NADER spoke with jaida Benjamin who is agreeable to  SNF. Bed ready at [facility] per liahi Galarza, EMS necessity form completed, Ambulance request submitted as WILL CALL, floor Rn and MD updated.                 Expected Discharge Date and Time       Expected Discharge Date Expected Discharge Time    Aug 16, 2024               STANFORD Baeza RN  SIPS/ICU   O: [phone]  C: [phone]  [email or web address]     "

## 2024-10-25 ENCOUNTER — TELEPHONE (OUTPATIENT)
Dept: HOSPITALIST | Facility: CLINIC | Age: 84
End: 2024-10-25

## 2025-02-12 ENCOUNTER — PATIENT OUTREACH (OUTPATIENT)
Dept: FAMILY MEDICINE CLINIC | Facility: CLINIC | Age: 85
End: 2025-02-12
Payer: MEDICARE

## 2025-02-12 NOTE — OUTREACH NOTE
"Attempted to reach patient, no voicemail available. Called regarding patient is due for an annual wellness visit    Relay     \"Please schedule for a Sub medicare wellness\"     "

## 2025-04-16 ENCOUNTER — PATIENT OUTREACH (OUTPATIENT)
Dept: FAMILY MEDICINE CLINIC | Facility: CLINIC | Age: 85
End: 2025-04-16
Payer: MEDICARE

## 2025-04-16 NOTE — OUTREACH NOTE
"Attempted to reach patient, no voicemail available, Letter mailed also  patient is due for an annual wellness visit, -separate appt from normal follow ups    Relay     \"Please schedule for a sub medicare wellness\"     "

## 2025-05-08 NOTE — PLAN OF CARE
Goal Outcome Evaluation:     Patient A&Ox4, pleasant and cooperative.     With complaints of chronic backpain not adequately controlled via measures taken including pharmacologic and nonpharmacologic measures this shift.     Patient remains ill appearing. BP soft but maintained map > 65 without intervention. Occasional cough noted. Somewhat congested and weak force behind cough.     Remains on IV ABX.                                                There are no Wet Read(s) to document.